# Patient Record
Sex: FEMALE | Race: WHITE | Employment: OTHER | URBAN - METROPOLITAN AREA
[De-identification: names, ages, dates, MRNs, and addresses within clinical notes are randomized per-mention and may not be internally consistent; named-entity substitution may affect disease eponyms.]

---

## 2021-09-12 ENCOUNTER — HOSPITAL ENCOUNTER (OUTPATIENT)
Age: 66
Discharge: HOME | End: 2021-09-12
Payer: MEDICARE

## 2021-09-12 VITALS
TEMPERATURE: 99 F | HEART RATE: 93 BPM | BODY MASS INDEX: 31.82 KG/M2 | OXYGEN SATURATION: 97 % | HEIGHT: 66 IN | DIASTOLIC BLOOD PRESSURE: 69 MMHG | SYSTOLIC BLOOD PRESSURE: 139 MMHG | WEIGHT: 198 LBS | RESPIRATION RATE: 16 BRPM

## 2021-09-12 DIAGNOSIS — Z20.822 ENCOUNTER FOR LABORATORY TESTING FOR COVID-19 VIRUS: Primary | ICD-10-CM

## 2021-09-12 DIAGNOSIS — R11.2 NON-INTRACTABLE VOMITING WITH NAUSEA, UNSPECIFIED VOMITING TYPE: ICD-10-CM

## 2021-09-12 LAB — SARS-COV-2 RNA RESP QL NAA+PROBE: NOT DETECTED

## 2021-09-12 PROCEDURE — U0002 COVID-19 LAB TEST NON-CDC: HCPCS | Performed by: NURSE PRACTITIONER

## 2021-09-12 PROCEDURE — 99203 OFFICE O/P NEW LOW 30 MIN: CPT | Performed by: NURSE PRACTITIONER

## 2021-09-12 RX ORDER — ATORVASTATIN CALCIUM 40 MG/1
40 TABLET, FILM COATED ORAL EVERY MORNING
COMMUNITY
Start: 2021-07-09

## 2021-09-12 RX ORDER — MONTELUKAST SODIUM 10 MG/1
10 TABLET ORAL DAILY
COMMUNITY
Start: 2021-07-26 | End: 2021-09-23

## 2021-09-12 RX ORDER — HYDROCHLOROTHIAZIDE 25 MG/1
25 TABLET ORAL EVERY MORNING
COMMUNITY
Start: 2021-07-09

## 2021-09-12 RX ORDER — SPIRONOLACTONE 50 MG/1
50 TABLET, FILM COATED ORAL DAILY
COMMUNITY
Start: 2021-07-09 | End: 2021-10-07

## 2021-09-12 RX ORDER — PIOGLITAZONEHYDROCHLORIDE 30 MG/1
30 TABLET ORAL EVERY MORNING
COMMUNITY
Start: 2021-06-14 | End: 2021-10-07

## 2021-09-12 RX ORDER — CLONAZEPAM 0.5 MG/1
0.5 TABLET ORAL NIGHTLY
COMMUNITY
Start: 2021-08-18

## 2021-09-12 RX ORDER — GLIMEPIRIDE 4 MG/1
4 TABLET ORAL 2 TIMES DAILY
COMMUNITY
Start: 2021-07-09 | End: 2021-10-07

## 2021-09-12 RX ORDER — IRBESARTAN 300 MG/1
300 TABLET ORAL EVERY MORNING
COMMUNITY
Start: 2021-07-09 | End: 2021-10-07

## 2021-09-12 RX ORDER — AMLODIPINE BESYLATE 10 MG/1
10 TABLET ORAL EVERY MORNING
COMMUNITY
Start: 2021-07-09

## 2021-09-12 RX ORDER — ONDANSETRON 4 MG/1
4 TABLET, ORALLY DISINTEGRATING ORAL EVERY 8 HOURS PRN
Qty: 10 TABLET | Refills: 0 | Status: SHIPPED | OUTPATIENT
Start: 2021-09-12 | End: 2021-09-17

## 2021-09-12 NOTE — ED PROVIDER NOTES
Patient Seen in: [facility]      History   Patient presents with:  Covid-19 Test    Stated Complaint: covid test     Subjective:   Patient presents to the immediate care accompanied by her .   Patient reports last night she had 1 episod Temp src    SpO2 97 %   O2 Device None (Room air)       Current:/69   Pulse 93   Temp 98.9 °F (37.2 °C)   Resp 16   Ht 167.6 cm (5' 6\")   Wt 89.8 kg   SpO2 97%   BMI 31.96 kg/m²         Physical Exam  Vitals and nursing note reviewed.    Jay normal.         Judgment: Judgment normal.             ED Course     Labs Reviewed   RAPID SARS-COV-2 BY PCR - Normal          No orders to display           MDM      Differential diagnosis: Gastroenteritis, COVID-19, viral illness      Patient is a 66-yea Dispersible  Take 1 tablet (4 mg total) by mouth every 8 (eight) hours as needed for Nausea., Normal, Disp-10 tablet, R-0

## 2021-09-17 ENCOUNTER — TELEMEDICINE (OUTPATIENT)
Dept: FAMILY MEDICINE CLINIC | Facility: CLINIC | Age: 66
End: 2021-09-17
Payer: MEDICARE

## 2021-09-17 DIAGNOSIS — R11.0 NAUSEA: Primary | ICD-10-CM

## 2021-09-17 PROCEDURE — 99203 OFFICE O/P NEW LOW 30 MIN: CPT | Performed by: NURSE PRACTITIONER

## 2021-09-17 RX ORDER — ONDANSETRON 4 MG/1
4 TABLET, ORALLY DISINTEGRATING ORAL EVERY 8 HOURS PRN
Qty: 15 TABLET | Refills: 0 | Status: SHIPPED | OUTPATIENT
Start: 2021-09-17 | End: 2021-09-24

## 2021-09-17 NOTE — ASSESSMENT & PLAN NOTE
Refilled zofran 4 mg I po tid prn nausea  Please call if symptoms worsen or are not resolving. Encourage soft, light diet.  Encourage fluid intake

## 2021-09-17 NOTE — PROGRESS NOTES
HPI   Video visit  Pt presents for UC follow up on 9/12 for nausea. Has appointment with Dr Junior Dempsey next Thursday. Her primary care doctor recently retired after 22 years. Is feeling nauseated and decreased appetite.  Denies abd pain, vomiting, diarrhea o Not on file  Transportation Needs:       Lack of Transportation (Medical): Not on file      Lack of Transportation (Non-Medical):  Not on file  Physical Activity:       Days of Exercise per Week: Not on file      Minutes of Exercise per Session: Not on file Constitutional:       General: She is not in acute distress. Appearance: Normal appearance. Pulmonary:      Effort: Pulmonary effort is normal. No respiratory distress.       Comments: No coughing, audible wheezing, shortness of breath or difficulty

## 2021-09-18 RX ORDER — ONDANSETRON 4 MG/1
TABLET, ORALLY DISINTEGRATING ORAL
Qty: 10 TABLET | Refills: 0 | Status: SHIPPED | OUTPATIENT
Start: 2021-09-18 | End: 2021-10-01

## 2021-09-23 ENCOUNTER — OFFICE VISIT (OUTPATIENT)
Dept: FAMILY MEDICINE CLINIC | Facility: CLINIC | Age: 66
End: 2021-09-23
Payer: MEDICARE

## 2021-09-23 VITALS
HEART RATE: 90 BPM | HEIGHT: 64.4 IN | TEMPERATURE: 98 F | DIASTOLIC BLOOD PRESSURE: 66 MMHG | WEIGHT: 189 LBS | SYSTOLIC BLOOD PRESSURE: 106 MMHG | RESPIRATION RATE: 16 BRPM | BODY MASS INDEX: 31.87 KG/M2

## 2021-09-23 DIAGNOSIS — E78.5 HYPERLIPIDEMIA, UNSPECIFIED HYPERLIPIDEMIA TYPE: ICD-10-CM

## 2021-09-23 DIAGNOSIS — Z86.39 HISTORY OF DIABETES MELLITUS: Primary | ICD-10-CM

## 2021-09-23 DIAGNOSIS — I10 ESSENTIAL HYPERTENSION: ICD-10-CM

## 2021-09-23 DIAGNOSIS — Z12.11 COLON CANCER SCREENING: ICD-10-CM

## 2021-09-23 DIAGNOSIS — R11.0 NAUSEA: ICD-10-CM

## 2021-09-23 PROCEDURE — 3008F BODY MASS INDEX DOCD: CPT | Performed by: FAMILY MEDICINE

## 2021-09-23 PROCEDURE — 99203 OFFICE O/P NEW LOW 30 MIN: CPT | Performed by: FAMILY MEDICINE

## 2021-09-23 PROCEDURE — 3078F DIAST BP <80 MM HG: CPT | Performed by: FAMILY MEDICINE

## 2021-09-23 PROCEDURE — 3074F SYST BP LT 130 MM HG: CPT | Performed by: FAMILY MEDICINE

## 2021-09-23 NOTE — PROGRESS NOTES
Subjective:   Patient ID: Rema Lucero is a 77year old female. Patient is here for follow up for chronic medical issues and to establish care. Pt's former physician just retired. The patient is compliant with medications and no side effects.  There Oral Tab Take 50 mg by mouth daily. Allergies:No Known Allergies    Objective:   Physical Exam  Constitutional:       Appearance: Normal appearance.    HENT:      Right Ear: Tympanic membrane normal.      Left Ear: Tympanic membrane normal.      Nose: screening: had COVID vaccine and negative testing:  - Also encouraged colon screening with GI and follow up if persistent nausea. Information provided.       Orders Placed This Encounter      Lipid Panel      Microalb/Creat Ratio, Random Urine      Comp Met

## 2021-09-24 ENCOUNTER — LAB ENCOUNTER (OUTPATIENT)
Dept: LAB | Age: 66
End: 2021-09-24
Attending: FAMILY MEDICINE
Payer: MEDICARE

## 2021-09-24 DIAGNOSIS — Z86.39 HISTORY OF DIABETES MELLITUS: ICD-10-CM

## 2021-09-24 LAB
ALBUMIN SERPL-MCNC: 3.7 G/DL (ref 3.4–5)
ALBUMIN/GLOB SERPL: 1 {RATIO} (ref 1–2)
ALP LIVER SERPL-CCNC: 100 U/L
ALT SERPL-CCNC: 23 U/L
ANION GAP SERPL CALC-SCNC: 8 MMOL/L (ref 0–18)
AST SERPL-CCNC: 14 U/L (ref 15–37)
BILIRUB SERPL-MCNC: 1 MG/DL (ref 0.1–2)
BUN BLD-MCNC: 23 MG/DL (ref 7–18)
BUN/CREAT SERPL: 17 (ref 10–20)
CALCIUM BLD-MCNC: 9.5 MG/DL (ref 8.5–10.1)
CHLORIDE SERPL-SCNC: 97 MMOL/L (ref 98–112)
CHOLEST SERPL-MCNC: 126 MG/DL (ref ?–200)
CO2 SERPL-SCNC: 28 MMOL/L (ref 21–32)
CREAT BLD-MCNC: 1.35 MG/DL
CREAT UR-SCNC: 203 MG/DL
DEPRECATED RDW RBC AUTO: 38.6 FL (ref 35.1–46.3)
ERYTHROCYTE [DISTWIDTH] IN BLOOD BY AUTOMATED COUNT: 12 % (ref 11–15)
EST. AVERAGE GLUCOSE BLD GHB EST-MCNC: 349 MG/DL (ref 68–126)
GLOBULIN PLAS-MCNC: 3.8 G/DL (ref 2.8–4.4)
GLUCOSE BLD-MCNC: 323 MG/DL (ref 70–99)
HBA1C MFR BLD HPLC: 13.8 % (ref ?–5.7)
HCT VFR BLD AUTO: 43.9 %
HDLC SERPL-MCNC: 42 MG/DL (ref 40–59)
HGB BLD-MCNC: 14.7 G/DL
LDLC SERPL CALC-MCNC: 63 MG/DL (ref ?–100)
MCH RBC QN AUTO: 29.1 PG (ref 26–34)
MCHC RBC AUTO-ENTMCNC: 33.5 G/DL (ref 31–37)
MCV RBC AUTO: 86.8 FL
MICROALBUMIN UR-MCNC: 26.7 MG/DL
MICROALBUMIN/CREAT 24H UR-RTO: 131.5 UG/MG (ref ?–30)
NONHDLC SERPL-MCNC: 84 MG/DL (ref ?–130)
OSMOLALITY SERPL CALC.SUM OF ELEC: 292 MOSM/KG (ref 275–295)
PATIENT FASTING Y/N/NP: YES
PATIENT FASTING Y/N/NP: YES
PLATELET # BLD AUTO: 301 10(3)UL (ref 150–450)
POTASSIUM SERPL-SCNC: 3.6 MMOL/L (ref 3.5–5.1)
PROT SERPL-MCNC: 7.5 G/DL (ref 6.4–8.2)
RBC # BLD AUTO: 5.06 X10(6)UL
SODIUM SERPL-SCNC: 133 MMOL/L (ref 136–145)
TRIGL SERPL-MCNC: 116 MG/DL (ref 30–149)
VLDLC SERPL CALC-MCNC: 17 MG/DL (ref 0–30)
WBC # BLD AUTO: 15.6 X10(3) UL (ref 4–11)

## 2021-09-24 PROCEDURE — 85027 COMPLETE CBC AUTOMATED: CPT

## 2021-09-24 PROCEDURE — 82570 ASSAY OF URINE CREATININE: CPT

## 2021-09-24 PROCEDURE — 80053 COMPREHEN METABOLIC PANEL: CPT

## 2021-09-24 PROCEDURE — 36415 COLL VENOUS BLD VENIPUNCTURE: CPT

## 2021-09-24 PROCEDURE — 83036 HEMOGLOBIN GLYCOSYLATED A1C: CPT

## 2021-09-24 PROCEDURE — 82043 UR ALBUMIN QUANTITATIVE: CPT

## 2021-09-24 PROCEDURE — 3046F HEMOGLOBIN A1C LEVEL >9.0%: CPT | Performed by: FAMILY MEDICINE

## 2021-09-24 PROCEDURE — 80061 LIPID PANEL: CPT

## 2021-09-29 ENCOUNTER — TELEMEDICINE (OUTPATIENT)
Dept: FAMILY MEDICINE CLINIC | Facility: CLINIC | Age: 66
End: 2021-09-29
Payer: MEDICARE

## 2021-09-29 ENCOUNTER — TELEPHONE (OUTPATIENT)
Dept: FAMILY MEDICINE CLINIC | Facility: CLINIC | Age: 66
End: 2021-09-29

## 2021-09-29 DIAGNOSIS — R63.0 LOSS OF APPETITE: ICD-10-CM

## 2021-09-29 DIAGNOSIS — Z86.39 HISTORY OF DIABETES MELLITUS: Primary | ICD-10-CM

## 2021-09-29 DIAGNOSIS — R11.0 NAUSEA: ICD-10-CM

## 2021-09-29 PROCEDURE — 99213 OFFICE O/P EST LOW 20 MIN: CPT | Performed by: FAMILY MEDICINE

## 2021-09-29 RX ORDER — BLOOD SUGAR DIAGNOSTIC
STRIP MISCELLANEOUS
Qty: 100 STRIP | Refills: 2 | Status: SHIPPED | OUTPATIENT
Start: 2021-09-29

## 2021-09-29 RX ORDER — BLOOD-GLUCOSE METER
KIT MISCELLANEOUS
Qty: 1 KIT | Refills: 0 | Status: SHIPPED | OUTPATIENT
Start: 2021-09-29

## 2021-09-29 NOTE — TELEPHONE ENCOUNTER
Good afternoon ,Dr. Cody Dennis is requesting for this patient to be seen before November 11? If, possible could anyone of Doctor or GI  Nurse practitioner. If, So please call the patient to schedule.   Please thanks

## 2021-09-29 NOTE — TELEPHONE ENCOUNTER
Summary of topics discussed: nausea/ loss of appetite     Pt presents with some recent nausea and now loss of appetite. No vomiting or abdominal pains. Pt states no fevers or diarrhea. Pt states foods does not seem too appetizing.  Has zofran which she take

## 2021-09-29 NOTE — PROGRESS NOTES
Subjective:   Patient ID: Ruby Clay is a 77year old female. This visit is conducted using Telemedicine with live, interactive video and audio during this Coronavirus pandemic.     Please note that the following visit was completed using two-way, Respiratory: Negative for cough and shortness of breath. Cardiovascular: Negative for chest pain. Gastrointestinal: Positive for nausea. Negative for abdominal pain, blood in stool, constipation, diarrhea and vomiting.    Psychiatric/Behavioral: Nega Etiology  - After discussion with patient, to monitor for symptoms and call if any significant symptoms; discussed soups and soft diet; zofran as needed; follow up with GI as planned. Will have staff call to see if she can be seen sooner.      No orders of

## 2021-09-29 NOTE — TELEPHONE ENCOUNTER
Pt states appointment for specialists is not until October and November. Pt states she feels nauseous and food doesn't taste good. Pt states she has not been eating very well. Pt then became tearful over the phone.    I asked if there was anything else

## 2021-10-01 ENCOUNTER — TELEPHONE (OUTPATIENT)
Dept: FAMILY MEDICINE CLINIC | Facility: CLINIC | Age: 66
End: 2021-10-01

## 2021-10-01 ENCOUNTER — HOSPITAL ENCOUNTER (INPATIENT)
Facility: HOSPITAL | Age: 66
LOS: 6 days | Discharge: HOME | DRG: 689 | End: 2021-10-07
Attending: EMERGENCY MEDICINE | Admitting: HOSPITALIST
Payer: MEDICARE

## 2021-10-01 DIAGNOSIS — N12 PYELONEPHRITIS: Primary | ICD-10-CM

## 2021-10-01 PROCEDURE — 99222 1ST HOSP IP/OBS MODERATE 55: CPT | Performed by: HOSPITALIST

## 2021-10-01 RX ORDER — ONDANSETRON 4 MG/1
4 TABLET, ORALLY DISINTEGRATING ORAL EVERY 8 HOURS PRN
Qty: 10 TABLET | Refills: 0 | Status: SHIPPED | OUTPATIENT
Start: 2021-10-01 | End: 2022-06-22

## 2021-10-01 RX ORDER — ONDANSETRON 2 MG/ML
4 INJECTION INTRAMUSCULAR; INTRAVENOUS ONCE
Status: COMPLETED | OUTPATIENT
Start: 2021-10-01 | End: 2021-10-01

## 2021-10-01 RX ORDER — ONDANSETRON 2 MG/ML
4 INJECTION INTRAMUSCULAR; INTRAVENOUS EVERY 4 HOURS PRN
Status: ACTIVE | OUTPATIENT
Start: 2021-10-01 | End: 2021-10-02

## 2021-10-01 NOTE — TELEPHONE ENCOUNTER
Refill passed per [employer] protocol. GI visit recommenced at last visit by Dr. Edwin Downey. Patient scheduled to see GI on 11/2/21. Requested Prescriptions   Pending Prescriptions Disp Refills    ondansetron 4 MG Oral Tablet Dispersible 10 tablet 0     Sig: Place 1 tablet (4 mg total) under the tongue every 8 (eight) hours as needed for Nausea.         Gastrointestional Medication Protocol Passed - 10/1/2021  3:13 PM        Passed - Appointment in past 12 or next 3 months            Future Appointments         Provider Department Appt Notes    In 3 weeks KEVAN Guillen Endocrinology diabetes     In 1 month Ramesh Haynes , [address] ref/dr/pae/loss/of/appetite/tp          Recent Outpatient Visits              2 days ago History of diabetes mellitus    [facility]    Telemedicine    1 week ago History of diabetes mellitus    Tito Mondragon MD    Office Visit    2 weeks ago Nausea    [place] [employer], [employer], [address], [facility], APRN    Telemedicine

## 2021-10-01 NOTE — ED INITIAL ASSESSMENT (HPI)
Pt verbalizes she has not been feeling since mid September. Pt reports constipation but took a laxative and that's better. Pt reports abd pain last night but now that's better.  Pt states \"I just can't eat\"

## 2021-10-01 NOTE — TELEPHONE ENCOUNTER
Patient states virtual visit 9/29 with Dr. Marjan Pettit regarding nausea and loss of appetite. Today patient reports \"not feeling good at all,\" and \"can't eat\" due to this loss of appetite and nausea. Patient tearful and asking if she should go to the ER.

## 2021-10-02 ENCOUNTER — APPOINTMENT (OUTPATIENT)
Dept: ULTRASOUND IMAGING | Facility: HOSPITAL | Age: 66
DRG: 689 | End: 2021-10-02
Attending: HOSPITALIST
Payer: MEDICARE

## 2021-10-02 PROCEDURE — 99233 SBSQ HOSP IP/OBS HIGH 50: CPT | Performed by: HOSPITALIST

## 2021-10-02 PROCEDURE — 76770 US EXAM ABDO BACK WALL COMP: CPT | Performed by: HOSPITALIST

## 2021-10-02 PROCEDURE — 99222 1ST HOSP IP/OBS MODERATE 55: CPT | Performed by: INTERNAL MEDICINE

## 2021-10-02 RX ORDER — MAGNESIUM SULFATE HEPTAHYDRATE 40 MG/ML
2 INJECTION, SOLUTION INTRAVENOUS ONCE
Status: DISCONTINUED | OUTPATIENT
Start: 2021-10-02 | End: 2021-10-06

## 2021-10-02 RX ORDER — PANTOPRAZOLE SODIUM 40 MG/1
40 TABLET, DELAYED RELEASE ORAL
Status: DISCONTINUED | OUTPATIENT
Start: 2021-10-02 | End: 2021-10-02

## 2021-10-02 RX ORDER — ATORVASTATIN CALCIUM 40 MG/1
40 TABLET, FILM COATED ORAL EVERY MORNING
Status: DISCONTINUED | OUTPATIENT
Start: 2021-10-02 | End: 2021-10-07

## 2021-10-02 RX ORDER — DEXTROSE MONOHYDRATE 25 G/50ML
50 INJECTION, SOLUTION INTRAVENOUS
Status: DISCONTINUED | OUTPATIENT
Start: 2021-10-02 | End: 2021-10-02

## 2021-10-02 RX ORDER — KETOROLAC TROMETHAMINE 15 MG/ML
15 INJECTION, SOLUTION INTRAMUSCULAR; INTRAVENOUS EVERY 6 HOURS PRN
Status: DISCONTINUED | OUTPATIENT
Start: 2021-10-02 | End: 2021-10-03

## 2021-10-02 RX ORDER — METOCLOPRAMIDE HYDROCHLORIDE 5 MG/ML
10 INJECTION INTRAMUSCULAR; INTRAVENOUS EVERY 6 HOURS PRN
Status: DISCONTINUED | OUTPATIENT
Start: 2021-10-02 | End: 2021-10-07

## 2021-10-02 RX ORDER — ZOLPIDEM TARTRATE 5 MG/1
5 TABLET ORAL NIGHTLY PRN
Status: DISCONTINUED | OUTPATIENT
Start: 2021-10-02 | End: 2021-10-07

## 2021-10-02 RX ORDER — ONDANSETRON 2 MG/ML
4 INJECTION INTRAMUSCULAR; INTRAVENOUS EVERY 4 HOURS PRN
Status: DISCONTINUED | OUTPATIENT
Start: 2021-10-02 | End: 2021-10-07

## 2021-10-02 RX ORDER — ONDANSETRON 2 MG/ML
4 INJECTION INTRAMUSCULAR; INTRAVENOUS EVERY 6 HOURS PRN
Status: DISCONTINUED | OUTPATIENT
Start: 2021-10-02 | End: 2021-10-02

## 2021-10-02 RX ORDER — POTASSIUM CHLORIDE 14.9 MG/ML
20 INJECTION INTRAVENOUS ONCE
Status: COMPLETED | OUTPATIENT
Start: 2021-10-02 | End: 2021-10-02

## 2021-10-02 RX ORDER — ACETAMINOPHEN 650 MG/1
650 SUPPOSITORY RECTAL EVERY 6 HOURS PRN
Status: DISCONTINUED | OUTPATIENT
Start: 2021-10-02 | End: 2021-10-07

## 2021-10-02 RX ORDER — SPIRONOLACTONE 50 MG/1
50 TABLET, FILM COATED ORAL DAILY
Status: DISCONTINUED | OUTPATIENT
Start: 2021-10-02 | End: 2021-10-07

## 2021-10-02 RX ORDER — SODIUM CHLORIDE 9 MG/ML
INJECTION, SOLUTION INTRAVENOUS CONTINUOUS
Status: DISCONTINUED | OUTPATIENT
Start: 2021-10-02 | End: 2021-10-03

## 2021-10-02 RX ORDER — HYDRALAZINE HYDROCHLORIDE 20 MG/ML
10 INJECTION INTRAMUSCULAR; INTRAVENOUS EVERY 4 HOURS PRN
Status: DISCONTINUED | OUTPATIENT
Start: 2021-10-02 | End: 2021-10-07

## 2021-10-02 RX ORDER — ACETAMINOPHEN 325 MG/1
650 TABLET ORAL EVERY 6 HOURS PRN
Status: DISCONTINUED | OUTPATIENT
Start: 2021-10-02 | End: 2021-10-02

## 2021-10-02 RX ORDER — AMLODIPINE BESYLATE 10 MG/1
10 TABLET ORAL EVERY MORNING
Status: DISCONTINUED | OUTPATIENT
Start: 2021-10-02 | End: 2021-10-07

## 2021-10-02 RX ORDER — CLONAZEPAM 0.5 MG/1
0.5 TABLET ORAL NIGHTLY
Status: DISCONTINUED | OUTPATIENT
Start: 2021-10-02 | End: 2021-10-07

## 2021-10-02 RX ORDER — HEPARIN SODIUM 5000 [USP'U]/ML
5000 INJECTION, SOLUTION INTRAVENOUS; SUBCUTANEOUS EVERY 12 HOURS SCHEDULED
Status: DISCONTINUED | OUTPATIENT
Start: 2021-10-02 | End: 2021-10-07

## 2021-10-02 RX ORDER — LOSARTAN POTASSIUM 100 MG/1
100 TABLET ORAL DAILY
Status: DISCONTINUED | OUTPATIENT
Start: 2021-10-02 | End: 2021-10-07

## 2021-10-02 RX ORDER — MAGNESIUM OXIDE 400 MG (241.3 MG MAGNESIUM) TABLET
400 TABLET ONCE
Status: COMPLETED | OUTPATIENT
Start: 2021-10-02 | End: 2021-10-02

## 2021-10-02 RX ORDER — DEXTROSE MONOHYDRATE 25 G/50ML
50 INJECTION, SOLUTION INTRAVENOUS
Status: DISCONTINUED | OUTPATIENT
Start: 2021-10-02 | End: 2021-10-07

## 2021-10-02 NOTE — ED PROVIDER NOTES
Patient Seen in: [facility] Emergency Department    History   Patient presents with:  Loss Of Appetite      HPI    The patient presents to the ED complaining of feeling poorly since several weeks ago.   She states that she has had decreased appetit exam.  Handwashing was performed prior to and after the exam.  Stethoscope and any equipment used during my examination was cleaned with super sani-cloth germicidal wipes following the exam.     Physical Exam  Vitals and nursing note reviewed.    Jay limits   CBC W/ DIFFERENTIAL - Abnormal; Notable for the following components:    WBC 18.7 (*)     Neutrophil Absolute Prelim 14.96 (*)     Neutrophil Absolute 14.96 (*)     Monocyte Absolute 1.62 (*)     All other components within normal limits   RAPID S The patient already has does not have any pertinent problems on file. to contribute to the complexity of this ED evaluation. ED Course: Patient presents to the ED feeling worse today. Laboratory testing sent and urinalysis concerning for UTI.   Patient

## 2021-10-02 NOTE — H&P
[facility] Patient Status:  Inpatient    1955 MRN [id number]   Location [facility] 5SW/SE Attending Steph Pineda MD   Hosp Day # 1 PCP Ellie Leger MD     Date:  10/2/2021  Date No No   Sig: To check blood sugars up to twice per day   Irbesartan 300 MG Oral Tab Past Week at Unknown time  Yes Yes   Sig: Take 300 mg by mouth every morning.    Pioglitazone HCl 30 MG Oral Tab Past Week at Unknown time  Yes Yes   Sig: Take 30 mg by mo atraumatic. Neck:  Supple, non-tender, no carotid bruit, no jugular venous distention, no lymphadenopathy, no thyromegaly.   Respiratory:  Lungs are clear to auscultation, respirations are non-labored, breath sounds are equal, symmetrical chest wall expans heparin    CODE STATUS  Full    Primary care physician  Magalys Khan MD    Disposition  Clinical course will dictate outcome      Nehemiah Echevarria MD  10/2/2021  4:48 AM

## 2021-10-02 NOTE — ED QUICK NOTES
Patient reports loss of appetite since September 8th when she ate a meal that she thought made her sick. Patient reports being constipated and loss of appetite since then.

## 2021-10-02 NOTE — CONSULTS
[facility] - [facility]    Report of Consultation    Nelson Matthewvirgil Patient Status:  Inpatient    1955 MRN [id number]   Location [facility] 5SW/SE Attending Doyle Markham MD   Hosp Day # 1 PCP Maura Thayer MD     Date of Admi flexpen 1-7 Units, 1-7 Units, Subcutaneous, TID CC and HS  •  ondansetron (ZOFRAN) injection 4 mg, 4 mg, Intravenous, Q6H PRN  •  acetaminophen (TYLENOL) tab 650 mg, 650 mg, Oral, Q6H PRN  •  hydrALAzine HCl (APRESOLINE) injection 10 mg, 10 mg, Intravenous Plan:  Patient Active Problem List:     Pain     Nausea     History of diabetes mellitus     Essential hypertension     Hyperlipidemia     Pyelonephritis    Patient is a 77year old female with Type 2 DM with DKA  Precipitating factors: uncontrolled DM, po

## 2021-10-02 NOTE — PLAN OF CARE
Problem: Patient Centered Care  Goal: Patient preferences are identified and integrated in the patient's plan of care  Description: Interventions:  - What would you like us to know as we care for you?  Keep patient involved in her care  - Provide timely, measures as appropriate and evaluate response  - Consider cultural and social influences on pain and pain management  - Manage/alleviate anxiety  - Utilize distraction and/or relaxation techniques  - Monitor for opioid side effects  - Notify MD/LIP if inte development  - Assess and document skin integrity  - Monitor for areas of redness and/or skin breakdown  - Initiate interventions, skin care algorithm/standards of care as needed  Outcome: Progressing   Patient transferred from 5th floor here, patient on 2

## 2021-10-02 NOTE — PLAN OF CARE
Afebrile, vital signs stable overnight. IV fluids running at 100 ml/hr. Pt had 2 occurrences of clear emesis. Pt to continue on IV Rocephin for UTI. Bed alarm on at all times, call light within reach, non-skid socks on, frequent rounding.     Problem: PAIN vomiting  Description: INTERVENTIONS:  - Maintain adequate hydration with IV or PO as ordered and tolerated  - Nasogastric tube to low intermittent suction as ordered  - Evaluate effectiveness of ordered antiemetic medications  - Provide nonpharmacologic c

## 2021-10-02 NOTE — PLAN OF CARE
Patient is alert and oriented x4, fever noted( see flowsheet) notified Dr. Blaze Samson received orders for blood culture which were drawn. Urine +Ecoli, IV zosyn started, IV mag not given as oral mag was given earlier.  Blood sugar monitored as ordered for the

## 2021-10-02 NOTE — ED QUICK NOTES
Orders for admission, patient is aware of plan and ready to go upstairs. Any questions, please call ED RN Terrance Martinez  at [address].      covid negative     Titratable drug(s) infusing:  Rate:  1L bolus  Rocephin     LOC at time of transport:Alert x 3  LA

## 2021-10-03 PROCEDURE — 99232 SBSQ HOSP IP/OBS MODERATE 35: CPT | Performed by: INTERNAL MEDICINE

## 2021-10-03 PROCEDURE — 99233 SBSQ HOSP IP/OBS HIGH 50: CPT | Performed by: HOSPITALIST

## 2021-10-03 RX ORDER — VANCOMYCIN HYDROCHLORIDE 125 MG/1
125 CAPSULE ORAL DAILY
Status: DISCONTINUED | OUTPATIENT
Start: 2021-10-03 | End: 2021-10-07

## 2021-10-03 RX ORDER — DEXTROSE MONOHYDRATE 25 G/50ML
50 INJECTION, SOLUTION INTRAVENOUS
Status: DISCONTINUED | OUTPATIENT
Start: 2021-10-03 | End: 2021-10-07

## 2021-10-03 RX ORDER — MAGNESIUM OXIDE 400 MG (241.3 MG MAGNESIUM) TABLET
800 TABLET ONCE
Status: COMPLETED | OUTPATIENT
Start: 2021-10-03 | End: 2021-10-03

## 2021-10-03 RX ORDER — SODIUM CHLORIDE 9 MG/ML
INJECTION, SOLUTION INTRAVENOUS CONTINUOUS
Status: DISCONTINUED | OUTPATIENT
Start: 2021-10-03 | End: 2021-10-04

## 2021-10-03 NOTE — PROGRESS NOTES
[facility]    Progress Note    Jada Chandra Patient Status:  Inpatient    1955 MRN [id number]   Location [facility] 2W/SW Attending Velma Worley MD   Hosp Day # 3 PCP Carlos Alberto Head MD     Subjective:  Feels muc in the endocrine clinic in 10-14 days, patient can please call [phone] to schedule FU  To call the clinic if sugar is under 80 or persistently over 300    Demarcus Odonnell MD

## 2021-10-03 NOTE — PROGRESS NOTES
[facility]    Progress Note    Belpre Members Patient Status:  Inpatient    1955 MRN [id number]   Location [facility] 2W/SW Attending Farooq Fernando MD   Hosp Day # 2 PCP Ousmane Sanchez MD     Subjective:  Feels muc

## 2021-10-03 NOTE — OCCUPATIONAL THERAPY NOTE
Communication Note to Team    Acknowledge & appreciate OT order. Chart Reviewed: Admit 10/1 with dec apetite (since mid Sept) - dx with acute UTI & DKA. Pt previously IND & active (cares for grandchildren).  Per PT Jonathan, 'Pt is independent with function

## 2021-10-03 NOTE — PHYSICAL THERAPY NOTE
PHYSICAL THERAPY EVALUATION - INPATIENT     Room Number: 220/220-A  Evaluation Date: 10/3/2021  Type of Evaluation: Initial   Physician Order: PT Eval and Treat    Presenting Problem:  (DKA)  Reason for Therapy: Mobility Dysfunction and Discharge Planning Prior Level of [place]: ind    SUBJECTIVE  \"I watch my grandchildren full time\"    PHYSICAL THERAPY EXAMINATION     OBJECTIVE          WEIGHT BEARING RESTRICTION                PAIN ASSESSMENT             COGNITION  · Overall Cognitive Status: At previous, functional level    Patient able to ambulate on level surfaces  At previous, functional level

## 2021-10-03 NOTE — PLAN OF CARE
Patient's vitals remained stable throughout shift. Nausea improving. Insulin gtt continued. Potassium replaced after 2100 draw. Denies pain. Will continue to monitor.     Problem: Patient Centered Care  Goal: Patient preferences are identified and integrate based on type and severity of pain and evaluate response  - Implement non-pharmacological measures as appropriate and evaluate response  - Consider cultural and social influences on pain and pain management  - Manage/alleviate anxiety  - Utilize distractio intact  Description: INTERVENTIONS  - Assess and document risk factors for pressure ulcer development  - Assess and document skin integrity  - Monitor for areas of redness and/or skin breakdown  - Initiate interventions, skin care algorithm/standards of ca

## 2021-10-03 NOTE — PLAN OF CARE
Patient A&O x4, on room air. Transitioned from insulin drip to subcutaneous insulin at 1400. Education provided on blood glucose monitoring, insulin administration, and diet. Diabetic educators consulted as well.  Patient started on clear liquid diet, state Interventions:   -   - See additional Care Plan goals for specific interventions  Outcome: Progressing     Problem: PAIN - ADULT  Goal: Verbalizes/displays adequate comfort level or patient's stated pain goal  Description: INTERVENTIONS:  - Encourage p low intermittent suction as ordered  - Evaluate effectiveness of ordered antiemetic medications  - Provide nonpharmacologic comfort measures as appropriate  - Advance diet as tolerated, if ordered  - Obtain nutritional consult as needed  - Evaluate fluid b

## 2021-10-04 ENCOUNTER — APPOINTMENT (OUTPATIENT)
Dept: CT IMAGING | Facility: HOSPITAL | Age: 66
DRG: 689 | End: 2021-10-04
Attending: INTERNAL MEDICINE
Payer: MEDICARE

## 2021-10-04 PROCEDURE — 3061F NEG MICROALBUMINURIA REV: CPT | Performed by: FAMILY MEDICINE

## 2021-10-04 PROCEDURE — 71250 CT THORAX DX C-: CPT | Performed by: INTERNAL MEDICINE

## 2021-10-04 PROCEDURE — 3060F POS MICROALBUMINURIA REV: CPT | Performed by: FAMILY MEDICINE

## 2021-10-04 PROCEDURE — 99233 SBSQ HOSP IP/OBS HIGH 50: CPT | Performed by: HOSPITALIST

## 2021-10-04 PROCEDURE — 99232 SBSQ HOSP IP/OBS MODERATE 35: CPT | Performed by: INTERNAL MEDICINE

## 2021-10-04 PROCEDURE — 99223 1ST HOSP IP/OBS HIGH 75: CPT | Performed by: INTERNAL MEDICINE

## 2021-10-04 PROCEDURE — 74176 CT ABD & PELVIS W/O CONTRAST: CPT | Performed by: INTERNAL MEDICINE

## 2021-10-04 RX ORDER — CIPROFLOXACIN 500 MG/1
500 TABLET, FILM COATED ORAL
Status: DISCONTINUED | OUTPATIENT
Start: 2021-10-04 | End: 2021-10-05

## 2021-10-04 RX ORDER — MAGNESIUM SULFATE HEPTAHYDRATE 40 MG/ML
2 INJECTION, SOLUTION INTRAVENOUS ONCE
Status: COMPLETED | OUTPATIENT
Start: 2021-10-04 | End: 2021-10-04

## 2021-10-04 NOTE — PROGRESS NOTES
[facility] - [facility]    Progress Note    Honora Sports Patient Status:  Inpatient    1955 MRN [id number]   Location [facility] 5SW/SE Attending Justice Rubinstein, MD   Hosp Day # 2 PCP Lowell Dong MD       Subjective:     Nino Organ hydration, use IV Reglan as needed for now,  -better today    Acute renal failure  Cr incr 1.86, US neg   -cont IVF  -monitor UO     Uncontrolled diabetes with associated nephropathy  Blood sugars poorly controlled, questionable compliance secondary to GI

## 2021-10-04 NOTE — CONSULTS
[place] [facility] - [facility]    Report of Consultation    Date of Admission:  10/1/2021  Date of Consult:  10/4/2021   Reason for Consultation:     JUAN     History of Present Illness:     Patient is a 77year old female with pmh of poorly controlled DM x > (VANCOCIN) cap 125 mg, 125 mg, Oral, Daily  amLODIPine (NORVASC) tab 10 mg, 10 mg, Oral, QAM  atorvastatin (LIPITOR) tab 40 mg, 40 mg, Oral, QAM  clonazePAM (KLONOPIN) tab 0.5 mg, 0.5 mg, Oral, Nightly  [Held by provider] losartan (COZAAR) tab 100 mg, 100 weakness  Neurological: negative for gait problems, memory problems and seizures  Behavioral/Psych: negative for anxiety and depression  Endocrine: negative for polyuria and weight loss     Physical Exam:   Height: --  Weight: --  BSA (Calculated - sq m): BILT 1.1  --   --   --   --    TP 6.2*  --   --   --   --     < > = values in this interval not displayed. No results found for: PTT, INR  No results for input(s): BNP in the last 168 hours.   Lab Results   Component Value Date    COLORUR Yellow 10/04

## 2021-10-04 NOTE — PLAN OF CARE
Pt with mild nausea after breakfast. Upgraded to solid food for lunch. Blood sugar well controlled. Up to bathroom with standby assist, still with diarrhea, good urine output. Transferring to medical floor room 538 on remote telemetry.  Report given to Mike Keyes Problem: PAIN - ADULT  Goal: Verbalizes/displays adequate comfort level or patient's stated pain goal  Description: INTERVENTIONS:  - Encourage pt to monitor pain and request assistance  - Assess pain using appropriate pain scale  - Administer analgesics nonpharmacologic comfort measures as appropriate  - Advance diet as tolerated, if ordered  - Obtain nutritional consult as needed  - Evaluate fluid balance  Outcome: Progressing     Problem: SKIN/TISSUE INTEGRITY - ADULT  Goal: Skin integrity remains intac

## 2021-10-04 NOTE — PAYOR COMM NOTE
--------------  ADMISSION REVIEW     Payor: Honey Gleason 673 #:  [id number]  Authorization Number: [id number]    Admit date: 10/1/21  Admit time: 11:48 PM       REVIEW DOCUMENTATION:               ED Provider Notes      ED Provider Notes signed b positives to the presenting problem noted.     Physical Exam     ED Triage Vitals [10/01/21 1854]   /78   Pulse 101   Resp 20   Temp 98.2 °F (36.8 °C)   Temp src Oral   SpO2 97 %   O2 Device None (Room air)       All measures to prevent infection mello components within normal limits   URINALYSIS WITH CULTURE REFLEX - Abnormal; Notable for the following components:    Clarity Urine Cloudy (*)     Glucose Urine >=500 (*)     Ketones Urine 20  (*)     Blood Urine Small (*)     Protein Urine 100  (*)     Ur 98% 98%   Weight: 84.4 kg     Height: 162.6 cm (5' 4\")       *I personally reviewed and interpreted all ED vitals.     Pulse Ox: 98%, Room air, Normal     Monitor Interpretation:   normal sinus rhythm    Differential Diagnosis/ Diagnostic Considerations: D Status:  Inpatient    1955 MRN [id number]   Location [facility] 5SW/SE Attending Katy Mehta MD   Hosp Day # 1 PCP Tammy Castleman, MD     Date:  10/2/2021  Date of Admission:  10/1/2021    Chief Complaint:  Patient presents with:  Loss O Oral Tab Past Week at Unknown time  Yes Yes   Sig: Take 300 mg by mouth every morning. Pioglitazone HCl 30 MG Oral Tab Past Week at Unknown time  Yes Yes   Sig: Take 30 mg by mouth every morning.    amLODIPine 10 MG Oral Tab Past Week at Unknown time  Yes distention, no lymphadenopathy, no thyromegaly. Respiratory:  Lungs are clear to auscultation, respirations are non-labored, breath sounds are equal, symmetrical chest wall expansion. Cardiovascular:  Normal rate, regular rhythm, no murmur, no edema.   Louisa Poplin MD    Disposition  Clinical course will dictate outcome      Kenyatta Ge MD  10/2/2021  4:48 AM      Electronically signed by Emperatriz Polk MD on 10/2/2021  7:33 AM         MEDICATIONS ADMINISTERED IN LAST 1 DAY:  amLODIPine (NORVASC) tab 10 mg infusion     Date Action Dose Route User    10/4/2021 0300 New Bag  Intravenous Naty Whaley    10/3/2021 1545 New Bag  Intravenous Cynthia Mota, RN      vancomycin (VANCOCIN) cap 125 mg     Date Action Dose Route User    10/4/2021 1013 Given  Oral S

## 2021-10-04 NOTE — PLAN OF CARE
Patient was up to bathroom several times throughout the night with loose bowel movements. Oral vancomycin started. Tolerating liquid diet well. Vital signs remain stable. Transfer orders in place. Will continue to monitor.    Problem: Patient Centered Care using appropriate pain scale  - Administer analgesics based on type and severity of pain and evaluate response  - Implement non-pharmacological measures as appropriate and evaluate response  - Consider cultural and social influences on pain and pain manage INTEGRITY - ADULT  Goal: Skin integrity remains intact  Description: INTERVENTIONS  - Assess and document risk factors for pressure ulcer development  - Assess and document skin integrity  - Monitor for areas of redness and/or skin breakdown  - Initiate in

## 2021-10-04 NOTE — PROGRESS NOTES
[facility]    Progress Note    Gustavo Ryan Patient Status:  Inpatient    1955 MRN [id number]   Location [facility] 5SW/SE Attending Davonte Thompson MD   Hosp Day # 3 PCP Tammy Castleman, MD       Subjective:     Jerzy Bess resistant to ampicillin and bactrim, only weakly sensitive to cefazolin, sensitive to levo/cipro  -blood cx sensitivities pending  -Renal US without obstruction.     Nausea vomiting abdominal discomfort  Suspect from DKA and UTI  More diarrhea overnight, d Interpretation  -------------------------- Sinus Rhythm Low voltage in precordial leads.  -Possible Anteroseptal infarct -age undetermined.  -Nonspecific ST depression -Nondiagnostic.  ABNORMAL No previous ECGs available Electronically signed on 10/03/2021

## 2021-10-04 NOTE — PROGRESS NOTES
[facility] Dosing Service  Antibiotic Dosing    Travis Payton is a 77year old for whom pharmacy is dosing Zosyn for treatment of  bacteremia and UTI. Allergies: has No Known Allergies.     Vitals: /72 (BP Location: Right arm)   Pulse 87   Te Braulio Trejo PharmD  10/4/2021  9:36 AM

## 2021-10-04 NOTE — CONSULTS
[facility] ID CONSULT NOTE    Rema Lucero Patient Status:  Inpatient    1955 MRN [id number]   Location [facility] 5SW/SE Attending Kim Roa MD   Hosp Day # 3 PCP Claudia Noyola MD       Reason for g, 15 g, Oral, Q15 Min PRN **OR** glucose-vitamin C (DEX-4) chewable tab 4 tablet, 4 tablet, Oral, Q15 Min PRN **OR** dextrose 50 % injection 50 mL, 50 mL, Intravenous, Q15 Min PRN **OR** glucose (DEX4) oral liquid 30 g, 30 g, Oral, Q15 Min PRN **OR** gluc No visual loss, blurred vision, double vision or yellow sclerae. Ears, Nose, Throat:  No hearing loss, sneezing, congestion, runny nose or sore throat. SKIN:  No rash or itching.   CARDIOVASCULAR:  No chest pain, chest pressure or chest discomfort  RESPIRA Microbiology: Reviewed in EMR    Radiology: Reviewed    ASSESSMENT:    Antibiotics: IV zosyn, OVP; (IV ceftriaxone x3    60-year-old female with a history of diabetes A1c 13.8, HTN, HLD who presents to hospital on 10/1 with not feeling well, constip

## 2021-10-04 NOTE — DIABETES ED
[facility]    Diabetes Education  Note    Nilton Mei Patient Status:  Inpatient   1955 MRN [id number]  Location [facility] 5SW/SE Attending Eulalia Boateng MD  Hosp Day # 3 PCP Donna King MD      Labs:    Bertha Posadas symptoms/treatment/prevention  · Actions of mixed insulin and correct timing of medication.   · Basic Diet Guidelines  · Beginning carb counting - initial meal plan provided  · Importance of good BG control to prevent short and long term complications  · Im

## 2021-10-05 PROCEDURE — 99233 SBSQ HOSP IP/OBS HIGH 50: CPT | Performed by: INTERNAL MEDICINE

## 2021-10-05 PROCEDURE — 99222 1ST HOSP IP/OBS MODERATE 55: CPT | Performed by: PHYSICIAN ASSISTANT

## 2021-10-05 PROCEDURE — 99232 SBSQ HOSP IP/OBS MODERATE 35: CPT | Performed by: INTERNAL MEDICINE

## 2021-10-05 PROCEDURE — 99233 SBSQ HOSP IP/OBS HIGH 50: CPT | Performed by: HOSPITALIST

## 2021-10-05 RX ORDER — POTASSIUM CHLORIDE 1.5 G/1.77G
40 POWDER, FOR SOLUTION ORAL ONCE
Status: COMPLETED | OUTPATIENT
Start: 2021-10-05 | End: 2021-10-05

## 2021-10-05 RX ORDER — CIPROFLOXACIN 500 MG/1
500 TABLET, FILM COATED ORAL EVERY 12 HOURS SCHEDULED
Status: DISCONTINUED | OUTPATIENT
Start: 2021-10-05 | End: 2021-10-07

## 2021-10-05 RX ORDER — MAGNESIUM OXIDE 400 MG (241.3 MG MAGNESIUM) TABLET
400 TABLET ONCE
Status: COMPLETED | OUTPATIENT
Start: 2021-10-05 | End: 2021-10-05

## 2021-10-05 RX ORDER — POTASSIUM CHLORIDE 20 MEQ/1
40 TABLET, EXTENDED RELEASE ORAL ONCE
Status: DISCONTINUED | OUTPATIENT
Start: 2021-10-05 | End: 2021-10-05

## 2021-10-05 NOTE — PROGRESS NOTES
[facility] Pharmacy Note:  Renal Adjustment for ciprofloxacin (CIPRO)    Rolando Mariscal is a 77year old patient who has been prescribed ciprofloxacin (CIPRO) 500 mg every 24 hrs.   The estimated creatinine clearance is 34.1 mL/min (A) (based on SCr of 1.4 mg/dL

## 2021-10-05 NOTE — PLAN OF CARE
Problem: Patient Centered Care  Goal: Patient preferences are identified and integrated in the patient's plan of care  Description: Interventions:  - What would you like us to know as we care for you?   - Provide timely, complete, and accurate informatio on pain and pain management  - Manage/alleviate anxiety  - Utilize distraction and/or relaxation techniques  - Monitor for opioid side effects  - Notify MD/LIP if interventions unsuccessful or patient reports new pain  - Anticipate increased pain with acti breakdown  - Initiate interventions, skin care algorithm/standards of care as needed  Outcome: Progressing     Monitoring vital signs- stable at this time. Monitoring blood glucose levels.  Fall precautions maintained- bed in lowest position, call light wit

## 2021-10-05 NOTE — PROGRESS NOTES
[facility]    Progress Note    Gustavo Ryan Patient Status:  Inpatient    1955 MRN [id number]   Location [facility] 2W/SW Attending Davonte Thompson MD   Hosp Day # 4 PCP Tammy Castleman, MD     Subjective:  Feels muc breakfast and dinner  Recommend FU in the endocrine clinic in 10-14 days, patient can please call [phone] to schedule FU  To call the clinic if sugar is under 80 or persistently over 300    Ingris Gonzalez MD

## 2021-10-05 NOTE — PAYOR COMM NOTE
--------------  CONTINUED STAY REVIEW    Payor: Fracisco Ritter  Subscriber #:  [id number]  Authorization Number: [id number]    Admit date: 10/1/21  Admit time: 11:48 PM    Admitting Physician: Steph Pineda MD  Attending Physician:  Matt Garrison short  - avoid nephrotoxins   - strict I/os       2.  UTI:  - Ecoli UTI  - ID input noted   - CT Scan pending  - abx changed from zosyn to ciprofloxacin        MEDICATIONS ADMINISTERED IN LAST 1 DAY:  amLODIPine (NORVASC) tab 10 mg     Date Action Dose Rout 9387 Given  Intravenous Maria Gomez RN    10/4/2021 1014 Given  Intravenous Erendira Woodard RN      vancomycin [facility]) cap 125 mg     Date Action Dose Route User    10/5/2021 0856 Given  Oral Maria Gomez RN    10/4/2021 1013 Given  Oral Kirt Magaña-SCI

## 2021-10-05 NOTE — CONSULTS
[facility]   Gastroenterology Consultation Note    Nelson Parks  Patient Status:    Inpatient  Date of Admission:         10/1/2021, Hospital day #4  Attending:   Doyle Markham MD  PCP:     Maura Thayer MD    Reason for Co Hx:  - No known history of esophageal, gastric or colon cancers  - No known IBD  - No known liver pathologies    Endoscopy Hx:  - remote hx of EGD/CLN 10+ years - no findings per patient     Social Hx:  - No tobacco use/No ETOH  - Denies illicit drug use spironolactone (ALDACTONE) tab, 50 mg, Oral, Daily  •  glucose (DEX4) oral liquid 15 g, 15 g, Oral, Q15 Min PRN **OR** glucose-vitamin C (DEX-4) chewable tab 4 tablet, 4 tablet, Oral, Q15 Min PRN **OR** dextrose 50 % injection 50 mL, 50 mL, Intravenous, Q1 kg), SpO2 95 %. Body mass index is 31.93 kg/m².     Gen: WDWN patient appears comfortable, NAD   HEENT: conjunctiva pink, the sclera appears anicteric, oropharynx clear, mucus membranes appear moist  CV: RRR  Lung: moves air well; no labored breathing  Abdo x6 weeks with associated 35 pound unintentional weight loss. Seen and examined with her spouse at bedside.     #nausea/vomiting  #poor PO intake  #epigastric abd discomfort  #weight loss    I suspect that the patient has underlying diabetic gastroparesis gi endoscopy/enteroscopy with the patient [who demonstrated understanding], including but not limited to the risks of bleeding, infection, pain, as well as the risks of anesthesia and perforation all leading to prolonged hospitalization, surgical intervention

## 2021-10-05 NOTE — PROGRESS NOTES
[facility]    Progress Note      Subjective:     Sitting comfortably - no cp or sob    No urinary complaints    Review of Systems:     Constitutional: negative for fatigue, fevers and weight loss  Eyes: negative for irritation, redness and g, 15 g, Oral, Q15 Min PRN   Or  glucose-vitamin C (DEX-4) chewable tab 4 tablet, 4 tablet, Oral, Q15 Min PRN   Or  dextrose 50 % injection 50 mL, 50 mL, Intravenous, Q15 Min PRN   Or  glucose (DEX4) oral liquid 30 g, 30 g, Oral, Q15 Min PRN   Or  glucose- 6. 97   WBC 19.4* 12.8* 10.3   .0 183.0 244.0     Recent Labs   Lab 10/02/21  0555 10/02/21  2109 10/03/21  0426 10/04/21  0420 10/05/21  0545   *  --  167* 98 161*   BUN 18  --  17 16 13   CREATSERUM 1.00  --  1.86* 2.03* 1.40*   GFRAA 68  -- Dictated by (CST): Alexandra Goodell, MD on 10/04/2021 at 6:21 PM     Finalized by (CST): Alexandra Goodell, MD on 10/04/2021 at 6:30 PM                Assessment and Plan:       1.  JUAN: non oliguric   -- BUN/Cr 22/1.27 mg/dl with an eGFR 44 ml/min ->1.8 -

## 2021-10-05 NOTE — PROGRESS NOTES
INFECTIOUS DISEASE PROGRESS NOTE  [place] [facility] ID PROGRESS NOTE    Мария Keene Patient Status:  Inpatient    1955 MRN [id number]   Location [facility] 5SW/SE Attending Celestino Loomis MD   [address] [facility] Hospital Day # 4 PC fatigue but denies fevers, chills, cough, shortness of breath dysuria, frequency. Also with about 35 lb weight over the last 1 month per patient. On admission T-max 100.6 with WBC up to 21.5 that has improved.   UA with pyuria and lactic acid normal.  Start

## 2021-10-05 NOTE — PROGRESS NOTES
[facility]    Progress Note    Gustavo Ryan Patient Status:  Inpatient    1955 MRN [id number]   Location [facility] 5SW/SE Attending Davonte Thompson MD   Hosp Day # 4 PCP Tammy Castleman, MD       Subjective:     Still coli, sensitivities resistant to ampicillin and bactrim, only weakly sensitive to cefazolin, sensitive to levo/cipro  -blood cx probably contaminant  -Renal US without obstruction.     Nausea vomiting abdominal discomfort  Suspect from DKA and UTI  More di 1.6 10/05/2021    PHOS 2.9 10/05/2021    TROP <0.045 10/02/2021       CT CHEST+ABDOMEN+PELVIS(CPT=71250/27562)    Result Date: 10/4/2021  CONCLUSION:  1. Perinephric fat stranding bilaterally, which may relate to edema, pyelonephritis or scarring.   Jamin Delatorre

## 2021-10-05 NOTE — PROGRESS NOTES
[facility]    Progress Note    Rema Lucero Patient Status:  Inpatient    1955 MRN [id number]   Location [facility] 2W/SW Attending Kim Roa MD   Hosp Day # 5 PCP Claudia Noyola MD     Subjective:  NPO this discharged    Recommend insulin 70/30 on discharge starting tomorrow  12 units with breakfast and dinner  Check sugars before breakfast and dinner  Recommend FU in the endocrine clinic in 10-14 days, patient can please call [phone] to schedule FU  To

## 2021-10-05 NOTE — DIETARY NOTE
ADULT NUTRITION INITIAL ASSESSMENT      RECOMMENDATIONS TO MD:  None at this time    Pt is at moderate nutrition risk. Pt does not meet malnutrition criteria.       ADMITTING DIAGNOSIS: Pyelonephritis [N12]  PERTINENT PAST MEDICAL HISTORY:   has a past med Corrective Scale insulin,, others noted. LABS: reviewed, A1C 13.8% elevated. and glucose 161 improving. NUTRITION RELATED PHYSICAL FINDINGS:  - NUTRITION FOCUSED PHYSICAL EXAM (NFPE):  no visible muscle wasting noted.     - Fluid Accumulation:  non Coordination of nutrition care: collaboration with other providers  - Discharge and transfer of nutrition care to new setting or provider: monitor plans    MONITOR AND EVALUATE/NUTRITION GOALS:  - Food and Nutrient Intake: Monitor: adequacy of PO intake an

## 2021-10-05 NOTE — H&P (VIEW-ONLY)
[facility]   Gastroenterology Consultation Note    Kahlil Stephenson  Patient Status:    Inpatient  Date of Admission:         10/1/2021, Hospital day #4  Attending:   Sabrina Shafer MD  PCP:     Zita Germain MD    Reason for Co Hx:  - No known history of esophageal, gastric or colon cancers  - No known IBD  - No known liver pathologies    Endoscopy Hx:  - remote hx of EGD/CLN 10+ years - no findings per patient     Social Hx:  - No tobacco use/No ETOH  - Denies illicit drug use spironolactone (ALDACTONE) tab, 50 mg, Oral, Daily  •  glucose (DEX4) oral liquid 15 g, 15 g, Oral, Q15 Min PRN **OR** glucose-vitamin C (DEX-4) chewable tab 4 tablet, 4 tablet, Oral, Q15 Min PRN **OR** dextrose 50 % injection 50 mL, 50 mL, Intravenous, Q1 kg), SpO2 95 %. Body mass index is 31.93 kg/m².     Gen: WDWN patient appears comfortable, NAD   HEENT: conjunctiva pink, the sclera appears anicteric, oropharynx clear, mucus membranes appear moist  CV: RRR  Lung: moves air well; no labored breathing  Abdo x6 weeks with associated 35 pound unintentional weight loss. Seen and examined with her spouse at bedside.     #nausea/vomiting  #poor PO intake  #epigastric abd discomfort  #weight loss    I suspect that the patient has underlying diabetic gastroparesis gi endoscopy/enteroscopy with the patient [who demonstrated understanding], including but not limited to the risks of bleeding, infection, pain, as well as the risks of anesthesia and perforation all leading to prolonged hospitalization, surgical intervention

## 2021-10-06 ENCOUNTER — ANESTHESIA EVENT (OUTPATIENT)
Dept: ENDOSCOPY | Facility: HOSPITAL | Age: 66
DRG: 689 | End: 2021-10-06
Payer: MEDICARE

## 2021-10-06 ENCOUNTER — ANESTHESIA (OUTPATIENT)
Dept: ENDOSCOPY | Facility: HOSPITAL | Age: 66
DRG: 689 | End: 2021-10-06
Payer: MEDICARE

## 2021-10-06 PROCEDURE — 99232 SBSQ HOSP IP/OBS MODERATE 35: CPT | Performed by: INTERNAL MEDICINE

## 2021-10-06 PROCEDURE — 99232 SBSQ HOSP IP/OBS MODERATE 35: CPT | Performed by: PHYSICIAN ASSISTANT

## 2021-10-06 PROCEDURE — 0DB68ZX EXCISION OF STOMACH, VIA NATURAL OR ARTIFICIAL OPENING ENDOSCOPIC, DIAGNOSTIC: ICD-10-PCS | Performed by: INTERNAL MEDICINE

## 2021-10-06 PROCEDURE — 43239 EGD BIOPSY SINGLE/MULTIPLE: CPT | Performed by: INTERNAL MEDICINE

## 2021-10-06 PROCEDURE — 99233 SBSQ HOSP IP/OBS HIGH 50: CPT | Performed by: HOSPITALIST

## 2021-10-06 RX ORDER — METOCLOPRAMIDE HYDROCHLORIDE 5 MG/ML
10 INJECTION INTRAMUSCULAR; INTRAVENOUS
Status: DISCONTINUED | OUTPATIENT
Start: 2021-10-06 | End: 2021-10-07

## 2021-10-06 RX ORDER — NALOXONE HYDROCHLORIDE 0.4 MG/ML
80 INJECTION, SOLUTION INTRAMUSCULAR; INTRAVENOUS; SUBCUTANEOUS AS NEEDED
Status: CANCELLED | OUTPATIENT
Start: 2021-10-06 | End: 2021-10-06

## 2021-10-06 RX ORDER — CIPROFLOXACIN 500 MG/1
500 TABLET, FILM COATED ORAL 2 TIMES DAILY
Qty: 8 TABLET | Refills: 0 | Status: SHIPPED | OUTPATIENT
Start: 2021-10-06 | End: 2021-10-10

## 2021-10-06 RX ORDER — SODIUM CHLORIDE, SODIUM LACTATE, POTASSIUM CHLORIDE, CALCIUM CHLORIDE 600; 310; 30; 20 MG/100ML; MG/100ML; MG/100ML; MG/100ML
INJECTION, SOLUTION INTRAVENOUS CONTINUOUS
Status: CANCELLED | OUTPATIENT
Start: 2021-10-06

## 2021-10-06 RX ORDER — VANCOMYCIN HYDROCHLORIDE 125 MG/1
125 CAPSULE ORAL DAILY
Qty: 10 CAPSULE | Refills: 0 | Status: SHIPPED | OUTPATIENT
Start: 2021-10-06 | End: 2021-10-16

## 2021-10-06 RX ORDER — LIDOCAINE HYDROCHLORIDE 10 MG/ML
INJECTION, SOLUTION EPIDURAL; INFILTRATION; INTRACAUDAL; PERINEURAL AS NEEDED
Status: DISCONTINUED | OUTPATIENT
Start: 2021-10-06 | End: 2021-10-06 | Stop reason: SURG

## 2021-10-06 RX ORDER — DEXTROSE MONOHYDRATE 25 G/50ML
50 INJECTION, SOLUTION INTRAVENOUS
Status: CANCELLED | OUTPATIENT
Start: 2021-10-06

## 2021-10-06 RX ORDER — SODIUM CHLORIDE, SODIUM LACTATE, POTASSIUM CHLORIDE, CALCIUM CHLORIDE 600; 310; 30; 20 MG/100ML; MG/100ML; MG/100ML; MG/100ML
INJECTION, SOLUTION INTRAVENOUS CONTINUOUS PRN
Status: DISCONTINUED | OUTPATIENT
Start: 2021-10-06 | End: 2021-10-06 | Stop reason: SURG

## 2021-10-06 RX ADMIN — SODIUM CHLORIDE, SODIUM LACTATE, POTASSIUM CHLORIDE, CALCIUM CHLORIDE: 600; 310; 30; 20 INJECTION, SOLUTION INTRAVENOUS at 13:12:00

## 2021-10-06 RX ADMIN — LIDOCAINE HYDROCHLORIDE 25 MG: 10 INJECTION, SOLUTION EPIDURAL; INFILTRATION; INTRACAUDAL; PERINEURAL at 13:14:00

## 2021-10-06 NOTE — ANESTHESIA PREPROCEDURE EVALUATION
Anesthesia PreOp Note    HPI:     Justin Bowling is a 77year old female who presents for preoperative consultation requested by:  Juliane Rodríguez MD    Date of Surgery: 10/1/2021 - 10/6/2021    Procedure(s):  ESOPHAGOGASTRODUODENOSCOPY (EGD)  Indication: by mouth every morning., Disp: , Rfl: , Past Week at Unknown time  spironolactone 50 MG Oral Tab, Take 50 mg by mouth daily. , Disp: , Rfl: , Past Week at Unknown time  ondansetron 4 MG Oral Tablet Dispersible, Place 1 tablet (4 mg total) under the tongue e 40 mg, 40 mg, Oral, QAM, Diya Whitfield MD, 40 mg at 10/05/21 0856  clonazePAM (KLONOPIN) tab 0.5 mg, 0.5 mg, Oral, Nightly, Diya Whitfield MD, 0.5 mg at 10/05/21 2040  [Held by provider] losartan (COZAAR) tab 100 mg, 100 mg, Oral, Daily, Rashad Valladares Relation Age of Onset   • Diabetes Father    • Hypertension Father    • Hypertension Mother      Social History    Socioeconomic History      Marital status:       Spouse name: Not on file      Number of children: Not on file      Years of education reviewed.   Lab Results   Component Value Date    WBC 9.0 10/06/2021    RBC 3.90 10/06/2021    HGB 11.2 (L) 10/06/2021    HCT 34.3 (L) 10/06/2021    MCV 87.9 10/06/2021    MCH 28.7 10/06/2021    MCHC 32.7 10/06/2021    RDW 12.8 10/06/2021    .0 10/06 Della Estrella and/or legal guardian or family member of the nature of the anesthetic plan, benefits, risks including possible dental damage if relevant, major complications, and any alternative forms of anesthetic management.    All of the patient's questions w

## 2021-10-06 NOTE — INTERVAL H&P NOTE
Pre-op Diagnosis: Nausea, Vomitting, unintentional weight loss, abd pain    The above referenced H&P was reviewed by Art Ibrahim MD on 10/6/2021, the patient was examined and no significant changes have occurred in the patient's condition since the H&P

## 2021-10-06 NOTE — PLAN OF CARE
Problem: Patient Centered Care  Goal: Patient preferences are identified and integrated in the patient's plan of care  Description: Interventions:  - What would you like us to know as we care for you?  I am from home with my spouse Parish  - Provide timely, non-pharmacological measures as appropriate and evaluate response  - Consider cultural and social influences on pain and pain management  - Manage/alleviate anxiety  - Utilize distraction and/or relaxation techniques  - Monitor for opioid side effects  - N pressure ulcer development  - Assess and document skin integrity  - Monitor for areas of redness and/or skin breakdown  - Initiate interventions, skin care algorithm/standards of care as needed  Outcome: Progressing    Monitoring vital signs- stable at [facility]

## 2021-10-06 NOTE — OPERATIVE REPORT
ESOPHAGOGASTRODUODENOSCOPY (EGD) REPORT    Rosanne Reyes     1955 Age 77year old   PCP Nichole Montenegro MD Endoscopist Bozena Nichols MD     Date of procedure: 10/06/21    Procedure: EGD w/biopsies    Pre-operative diagnosis: n/v, uncontrolled DM    P incisura) were taken with cold forceps for histology. Paucity of contractions seen in stomach (< 3/min) suggestive of gastroparesis. 3. Duodenum: The duodenal mucosa appeared normal in the 1st and 2nd and 3rd portion of the duodenum.      We then withdre

## 2021-10-06 NOTE — PROGRESS NOTES
[facility]  Nephrology Daily Progress Note    Hola Mccoy  [id number]  77year old      HPI:   Hola Mccoy is a 77year old female. Just feeling a little tired but otherwise OK. NPO for EGD today.   Was drinking fluids well last 10/06/2021    HGB 11.2 10/06/2021    HCT 34.3 10/06/2021    .0 10/06/2021    CREATSERUM 1.03 10/06/2021    BUN 12 10/06/2021     10/06/2021    K 3.7 10/06/2021     10/06/2021    CO2 28.0 10/06/2021     10/06/2021    CA 8.4 10/06/2 Cassi Cruz MD on 10/04/2021 at 6:30 PM              Medications:    Current Facility-Administered Medications:   •  magnesium sulfate IVPB premix 4 g, 4 g, Intravenous, Once  •  ciprofloxacin (CIPRO) tab 500 mg, 500 mg, Oral, 2 times per day  •  insulin detem Sodium (PROTONIX) 40 mg in Sodium Chloride (PF) 0.9 % 10 mL IV push, 40 mg, Intravenous, Daily  •  acetaminophen (Tylenol) rectal suppository 650 mg, 650 mg, Rectal, Q6H PRN  •  ondansetron (ZOFRAN) injection 4 mg, 4 mg, Intravenous, Q4H PRN    Allergies:

## 2021-10-06 NOTE — ANESTHESIA POSTPROCEDURE EVALUATION
Patient: Ebb Sly    Procedure Summary     Date: 10/06/21 Room / Location: [address] [place] [address] ENDOSCOPY 05 / [address] [place] [address] ENDOSCOPY    Anesthesia Start: 3011 Anesthesia Stop: 8506    Procedure: ESOPHAGOGASTRODUODENOSCOPY (EGD) (N/A ) Diagnosis: (gastroparesis)    Surgeons

## 2021-10-06 NOTE — PROGRESS NOTES
[facility]     Gastroenterology Progress Note    Chris Meghan Patient Status:  Inpatient    1955 MRN [id number]   Location [facility] 5SW/SE Attending Lucille Rosado MD   Hosp Day # 5 PCP Spencer Goodpasture, MD Darrelyn Ouch fever overnight, recommend EGD evaluation today. Additional outpatient evaluation to include gastric emptying scan and colonoscopy.      Continue anti-emetics and PPI daily.     Admitted with findings of diabetic ketoacidosis which have improved as well as the chest, abdomen or pelvis. 3. Status post hysterectomy. 4. Moderate hepatic steatosis with mild splenomegaly. 5. Mild atelectasis and/or scarring in both lower lungs. 6. Lesser incidental findings as above.     Dictated by (CST): Aurora Lock MD on

## 2021-10-06 NOTE — PROGRESS NOTES
made Pastoral visit.  consulted with bedside RN. RN reported that Pt is having a procedure. Pt was sitting up in bed and talking on her cell phone. Pt expressed that she was doing fine. Pt is  and has an adult daughter.      Pt h

## 2021-10-06 NOTE — PROGRESS NOTES
[facility]OSP - [facility]    Progress Note    Harika Galdamez Patient Status:  Inpatient    1955 MRN [id number]   [facility] 2W/SW Attending Teresa Bennett MD   Hosp Day # 6 PCP Dinora Guthrie MD     Subjective  Feels okay sugars before breakfast and dinner  Recommend FU in the endocrine clinic in 10-14 days, patient can please call [phone] to schedule FU  To call the clinic if sugar is under 80 or persistently over 300    Pia Muniz MD

## 2021-10-06 NOTE — PROGRESS NOTES
[facility]    Progress Note    Elcalvin Carballo Patient Status:  Inpatient    1955 MRN [id number]   Location [facility] 5SW/SE Attending Vignesh Grace MD   Hosp Day # 5 PCP Elisabet Hernandez MD       Subjective:     Eddie Ruiz positive for E. coli, sensitivities resistant to ampicillin and bactrim, only weakly sensitive to cefazolin, sensitive to levo/cipro  -blood cx probably contaminant  -Renal US without obstruction.     Nausea vomiting abdominal discomfort  Suspect from fadi K 3.7 10/06/2021     10/06/2021    CO2 28.0 10/06/2021     (H) 10/06/2021    CA 8.4 (L) 10/06/2021    ALB 2.1 (L) 10/06/2021    ALKPHO 75 10/06/2021    BILT 0.5 10/06/2021    TP 6.3 (L) 10/06/2021    AST 19 10/06/2021    ALT 23 10/06/2021    M

## 2021-10-07 VITALS
SYSTOLIC BLOOD PRESSURE: 107 MMHG | DIASTOLIC BLOOD PRESSURE: 76 MMHG | OXYGEN SATURATION: 95 % | HEIGHT: 64 IN | HEART RATE: 93 BPM | RESPIRATION RATE: 14 BRPM | TEMPERATURE: 99 F | WEIGHT: 186 LBS | BODY MASS INDEX: 31.76 KG/M2

## 2021-10-07 PROCEDURE — 99232 SBSQ HOSP IP/OBS MODERATE 35: CPT | Performed by: PHYSICIAN ASSISTANT

## 2021-10-07 PROCEDURE — 99239 HOSP IP/OBS DSCHRG MGMT >30: CPT | Performed by: HOSPITALIST

## 2021-10-07 PROCEDURE — 99232 SBSQ HOSP IP/OBS MODERATE 35: CPT | Performed by: INTERNAL MEDICINE

## 2021-10-07 RX ORDER — HUMAN INSULIN 100 [IU]/ML
12 INJECTION, SUSPENSION SUBCUTANEOUS 2 TIMES DAILY
Qty: 1 EACH | Refills: 0 | Status: SHIPPED | OUTPATIENT
Start: 2021-10-07

## 2021-10-07 NOTE — DISCHARGE SUMMARY
[facility] HOSPITALIST  DISCHARGE SUMMARY     Kahlil Stephenson Patient Status:  Inpatient    1955 MRN [id number]   [facility] 5SW/SE Attending Deanan Petty, [address] [facility] Se Day # 6 PCP Zita Germain MD     DATE OF ADMISSION: 10/1/2021  DA unremarkable aside from suspected gastroparesis. Patient understands return to the emergency room for increased pain, fever, discharge, shortness of breath, chest pain, new neurologic symptoms, other concerning symptoms.     PHYSICAL EXAM:  Temp:  [98.6 Tabs  Commonly known as: KLONOPIN      Take 0.5 mg by mouth nightly. TAKE AT BEDTIME   Refills: 0     hydroCHLOROthiazide 25 MG Tabs  Commonly known as: HYDRODIURIL      Take 25 mg by mouth every morning.    Refills: 0     ondansetron 4 MG Tbdp  Commonly kn [address] [facility]  [phone]    Schedule an appointment as soon as possible for a visit in 10 days  diabetes    [facility], [address]  [phone]    In 4 weeks  HAILEY Gipson MD  [facility]

## 2021-10-07 NOTE — PROGRESS NOTES
[place] [facility] - [facility]    Progress Note      Subjective:     S/p EGD yesterday - showed severely diminished gastric contraction     No nausea or vomiting or diarrhea     Review of Systems:     Constitutional: negative for fatigue, fevers and weight los Subcutaneous, Q24H  Insulin Aspart Pen (NOVOLOG) 100 UNIT/ML flexpen 4 Units, 4 Units, Subcutaneous, TID CC  glucose (DEX4) oral liquid 15 g, 15 g, Oral, Q15 Min PRN   Or  glucose-vitamin C (DEX-4) chewable tab 4 tablet, 4 tablet, Oral, Q15 Min PRN   Or  d 10.8*   HCT 32.8* 34.3* 32.8*   MCV 87.2 87.9 88.6   NEPRELIM 6.97 5.44 5.38   WBC 10.3 9.0 9.0   .0 261.0 255.0     Recent Labs   Lab 10/02/21  0555 10/02/21  2109 10/05/21  0545 10/06/21  0530 10/07/21  0521   *   < > 161* 178* 170*   BUN 1

## 2021-10-07 NOTE — PROGRESS NOTES
Discharge RN Summary: Patient has discharge order in. Patient to discharge home with family. IV removed by this RN. Understands to follow up with PCP in 1 week. Patient understands to  meds with printed scripts and electronically.        Scripts sen

## 2021-10-07 NOTE — PLAN OF CARE
EGD today  Gave reglan prior to meals. No complaints of nausea or vomiting.      Problem: Diabetes/Glucose Control  Goal: Glucose maintained within prescribed range  Description: INTERVENTIONS:  - Monitor Blood Glucose as ordered  - Assess for signs and sy

## 2021-10-07 NOTE — DIETARY NOTE
Education Nutrition Note    Received  order for diet education. Provided pt and family diet instructions and hand out on Gastroparesis Nutrition Therapy. Emphasized Small frequent meals, Low fiber and low fat and soft or smooth food texture.  Encourage pt t

## 2021-10-07 NOTE — PLAN OF CARE
Pt alert and oriented. VS stable overnight. No nausea/vomiting overnight. Fall precautions in place. Plan for home upon discharge.      Problem: Patient Centered Care  Goal: Patient preferences are identified and integrated in the patient's plan of care ordered antiemetic medications  - Provide nonpharmacologic comfort measures as appropriate  - Advance diet as tolerated, if ordered  - Obtain nutritional consult as needed  - Evaluate fluid balance  Outcome: Progressing     - Provide timely, complete, and

## 2021-10-07 NOTE — PROGRESS NOTES
[facility]     Gastroenterology Progress Note    Miguel Griffith Patient Status:  Inpatient    1955 MRN [id number]   Location [facility] 5SW/SE Attending Vargas Aguilar MD   Hosp Day # 6 PCP MD Jacky Schwab gastroparesis (in setting of A1c 13.8%)  2.  Otherwise normal EGD  PATH (reviewed with patient and spouse)  Stomach; biopsy:  · Gastric oxyntic type mucosa without significant histopathology  · Diff-quik stain (with reactive positive control) is negative fo

## 2021-10-08 ENCOUNTER — PATIENT OUTREACH (OUTPATIENT)
Dept: CASE MANAGEMENT | Age: 66
End: 2021-10-08

## 2021-10-08 ENCOUNTER — TELEPHONE (OUTPATIENT)
Dept: ENDOCRINOLOGY CLINIC | Facility: CLINIC | Age: 66
End: 2021-10-08

## 2021-10-08 DIAGNOSIS — Z02.9 ENCOUNTERS FOR UNSPECIFIED ADMINISTRATIVE PURPOSE: ICD-10-CM

## 2021-10-08 NOTE — PAYOR COMM NOTE
--------------  DISCHARGE REVIEW    Payor: Wilmon Paget  Subscriber #:  [id number]  Authorization Number: [id number]    Admit date: 10/1/21  Admit time:  11:48 PM  Discharge Date: 10/7/2021  5:00 PM     Admitting Physician: Jeremi Pearson MD  Attending generalized weakness as well. HOSPITAL COURSE:  Patient was admitted, then transferred to the PCU for insulin drip as she developed DKA. Anion gap closed, endocrine consulted, switched to subcu insulin and did well.   She will be discharged on subcu ins UNIT/ML Supn  Generic drug: Insulin NPH & Regular      Inject 12 Units into the skin 2 (two) times a day. Quantity: 1 each  Refills: 0     vancomycin 125 MG Caps  Commonly known as: VANCOCIN      Take 1 capsule (125 mg total) by mouth daily for 10 days. Tabs  · NovoLIN 70/30 FlexPen Relion (70-30) 100 UNIT/ML Supn         CONSULTANTS  Consultants  Chat With All Active Members    Provider Role Specialty    Wesley Hassan MD  Consulting Physician  Cherelle Arciniega MD  Consulting Phys

## 2021-10-08 NOTE — TELEPHONE ENCOUNTER
RN offered 10/20/21 at [facility] with APN which she accepted. RN advised her to continue checking her BG and bring the log to her appointment. She voiced understanding. Pt states she's been at [facility] location; RN still reviewed location.       Future Appointments

## 2021-10-08 NOTE — TELEPHONE ENCOUNTER
Pt called in stating that she needs to be seen within 2 weeks for hospital follow up. Pt is on schedule for October 22nd but is wondering if she can be seen sooner.  The schedule is booked please follow up

## 2021-10-11 ENCOUNTER — TELEPHONE (OUTPATIENT)
Dept: FAMILY MEDICINE CLINIC | Facility: CLINIC | Age: 66
End: 2021-10-11

## 2021-10-11 NOTE — TELEPHONE ENCOUNTER
Attempts to reach patient have been unsuccessful, messages left and no call back as yet. Patient has an HFU scheduled on 10/13/2021 at 10:20 am for 20 minutes, Patient needs a TCM HFU which requires a 30 minute appointment.      Triage: Please notify

## 2021-10-11 NOTE — PROGRESS NOTES
NCM placed call to patient for TCM, LM requesting a call to [phone] back with a condition update. JENNIFER sent message to MD's office that patient needs a longer appointment for TCM HFU appointment.

## 2021-10-13 ENCOUNTER — OFFICE VISIT (OUTPATIENT)
Dept: FAMILY MEDICINE CLINIC | Facility: CLINIC | Age: 66
End: 2021-10-13
Payer: MEDICARE

## 2021-10-13 VITALS
RESPIRATION RATE: 14 BRPM | HEART RATE: 92 BPM | HEIGHT: 64.6 IN | WEIGHT: 184 LBS | TEMPERATURE: 97 F | SYSTOLIC BLOOD PRESSURE: 90 MMHG | BODY MASS INDEX: 31.03 KG/M2 | DIASTOLIC BLOOD PRESSURE: 62 MMHG

## 2021-10-13 DIAGNOSIS — I10 ESSENTIAL HYPERTENSION: ICD-10-CM

## 2021-10-13 DIAGNOSIS — N12 PYELONEPHRITIS: ICD-10-CM

## 2021-10-13 DIAGNOSIS — R11.0 NAUSEA: ICD-10-CM

## 2021-10-13 DIAGNOSIS — Z86.39 HISTORY OF DIABETES MELLITUS: ICD-10-CM

## 2021-10-13 PROCEDURE — 1111F DSCHRG MED/CURRENT MED MERGE: CPT | Performed by: FAMILY MEDICINE

## 2021-10-13 PROCEDURE — 99495 TRANSJ CARE MGMT MOD F2F 14D: CPT | Performed by: FAMILY MEDICINE

## 2021-10-13 PROCEDURE — 3008F BODY MASS INDEX DOCD: CPT | Performed by: FAMILY MEDICINE

## 2021-10-13 PROCEDURE — 3074F SYST BP LT 130 MM HG: CPT | Performed by: FAMILY MEDICINE

## 2021-10-13 PROCEDURE — 3078F DIAST BP <80 MM HG: CPT | Performed by: FAMILY MEDICINE

## 2021-10-13 NOTE — PROGRESS NOTES
HPI:    Jada Chandra is a 77year old female here today for hospital follow up.    She was discharged from Inpatient hospital, [facility]  to Home   Admission Date: 10/1/21   Discharge Date: 10/7/21  Hospital Discharge Diagnoses (since 9/13/2021) Hosp-Admission  Discharged     10/1/2021 - 10/7/2021 (6 days)  [place]λων MD Belén    Last attending • Treatment team  Pyelonephritis    Principal problem       Discharge Summary  Margie Teixeira MD (Physician) • • Hospitalist  [facility] outpatient.     Patient had significant nausea during her admission. Seems like this may have been going on for some time prior to admission. Suspect combination of diabetic gastroparesis and reaction to her acute infection.   EGD during this admission wa these medications      Instructions Prescription details   amLODIPine 10 MG Tabs  Commonly known as: NORVASC       Take 10 mg by mouth every morning.    Refills: 0      atorvastatin 40 MG Tabs  Commonly known as: LIPITOR       Take 40 mg by mouth every mor INFECTIOUS DISEASES      Rickie Mckenzie MD  Consulting Physician  NEPHROLOGY     Kahlil Dash MD  Consulting Physician  ENDOCRINOLOGY             FOLLOW UP:  MD Kye [address] [id number]  [phone]     Schedule an a every morning. atorvastatin 40 MG Oral Tab, Take 40 mg by mouth every morning. clonazePAM 0.5 MG Oral Tab, Take 0.5 mg by mouth nightly. TAKE AT BEDTIME  hydroCHLOROthiazide 25 MG Oral Tab, Take 25 mg by mouth every morning.     No current facility-admini Normal rate and regular rhythm. Pulses: Normal pulses. Heart sounds: Normal heart sounds. No murmur heard. Pulmonary:      Effort: Pulmonary effort is normal. No respiratory distress. Breath sounds: Normal breath sounds. No wheezing. Medical Decision Making- Based on service period of discharge to 30 days:   · Number of Possible Diagnoses and/or Management Options: moderate  · Amount and/or Complexity of Data to Be Reviewed: moderate  · Risk of Significant Complications, Morbidity, a

## 2021-10-13 NOTE — PROGRESS NOTES
Multiple attempts to reach the pt and messages left with no returned phone call. Pt went to HFU with PCP today, closing encounter.

## 2021-10-20 ENCOUNTER — OFFICE VISIT (OUTPATIENT)
Dept: ENDOCRINOLOGY CLINIC | Facility: CLINIC | Age: 66
End: 2021-10-20
Payer: MEDICARE

## 2021-10-20 VITALS
SYSTOLIC BLOOD PRESSURE: 116 MMHG | WEIGHT: 187 LBS | DIASTOLIC BLOOD PRESSURE: 74 MMHG | HEIGHT: 64.5 IN | HEART RATE: 88 BPM | BODY MASS INDEX: 31.54 KG/M2

## 2021-10-20 DIAGNOSIS — E11.65 UNCONTROLLED TYPE 2 DIABETES MELLITUS WITH HYPERGLYCEMIA (HCC): Primary | ICD-10-CM

## 2021-10-20 DIAGNOSIS — I10 ESSENTIAL HYPERTENSION: ICD-10-CM

## 2021-10-20 PROCEDURE — 3078F DIAST BP <80 MM HG: CPT | Performed by: NURSE PRACTITIONER

## 2021-10-20 PROCEDURE — 3046F HEMOGLOBIN A1C LEVEL >9.0%: CPT | Performed by: NURSE PRACTITIONER

## 2021-10-20 PROCEDURE — 82947 ASSAY GLUCOSE BLOOD QUANT: CPT | Performed by: NURSE PRACTITIONER

## 2021-10-20 PROCEDURE — 3008F BODY MASS INDEX DOCD: CPT | Performed by: NURSE PRACTITIONER

## 2021-10-20 PROCEDURE — 3074F SYST BP LT 130 MM HG: CPT | Performed by: NURSE PRACTITIONER

## 2021-10-20 PROCEDURE — 99214 OFFICE O/P EST MOD 30 MIN: CPT | Performed by: NURSE PRACTITIONER

## 2021-10-20 NOTE — PATIENT INSTRUCTIONS
A1C: 12.4% today --> decreased from 13.8% on 9/24/2021  Blood glucose: 253 in clinic today    Medications:   -Increase Novolin 70/30: 12--> 20 units with breakfast      12--> 20 units with dinner       -lets work on limiting carbohydrates to 30- 45gm per m

## 2021-10-20 NOTE — PROGRESS NOTES
Name: Miguel Griffith  Date: 10/20/2021    Referring Physician: No ref. provider found    CHIEF COMPLAINT   Patient presents with:  Consult: Pt is here to discuss DM. Poc  and A1c 12.4 Was in the ER on 10/01/21 not feeling well and no appetite.     H (grapes) with water or diluted apple juice or diluated orange juice. Lunch: casadilla or sandwich with cottage cheese and peaches; skips 3-4x weekly   Dinner: chicken with green beans and mastacholi; or spaghetti or meatloaf with carrots and potatoes. sugars up to twice per day, Disp: 100 strip, Rfl: 2  •  amLODIPine 10 MG Oral Tab, Take 10 mg by mouth every morning. (Patient not taking: Reported on 10/20/2021), Disp: , Rfl:   •  atorvastatin 40 MG Oral Tab, Take 40 mg by mouth every morning.  (Patient n swelling, no lesions    LABS: Pertinent labs reviewed    ASSESSMENT/PLAN:    -Reviewed with patient the pathogenesis of diabetes, clinical significance of A1c, and common complications such as: microvascular, macrovascular and diabetic ketoacidosis.  Cricket Patient instructed to call sooner if they develop Blood glucose readings <75 and/or if they have readings persistently >200. The risks and benefits of my recommendations were discussed with the patient today.  questions were also answered to the best

## 2021-10-21 ENCOUNTER — NURSE ONLY (OUTPATIENT)
Dept: ENDOCRINOLOGY CLINIC | Facility: CLINIC | Age: 66
End: 2021-10-21
Payer: MEDICARE

## 2021-10-21 DIAGNOSIS — Z86.39 HISTORY OF DIABETES MELLITUS: ICD-10-CM

## 2021-10-21 NOTE — PROGRESS NOTES
Ruby Clay  : 1955 attended individual initial assessment for Diabetes Education:    Date: 10/21/2021   Start time: 1235P End time: 105P    HEMOGLOBIN A1C (%)   Date Value   10/20/2021 12.4 (A)        8a/9a in morning insulin  This morning at this time.     Isaias Heard RD

## 2021-11-01 NOTE — H&P
[facility], [facility] - Gastroenterology                                                                                                               Reason for consult: f/u    Requesting physician or provider: Rajwinder Orozco normal      Wt Readings from Last 6 Encounters:  11/02/21 : 193 lb (87.5 kg)  10/20/21 : 187 lb (84.8 kg)  10/13/21 : 184 lb (83.5 kg)  10/01/21 : 186 lb (84.4 kg)  09/23/21 : 189 lb (85.7 kg)  09/12/21 : 198 lb (89.8 kg)       History, Medications, Allerg mg by mouth every morning.  (Patient not taking: No sig reported)         Allergies:  No Known Allergies    ROS:   CONSTITUTIONAL: negative for fevers, chills, sweats and weight loss  EYES Negative for red eyes, yellow eyes, changes in vision  HEENT: Negati Collection Time: 10/07/21  5:21 AM   Result Value Ref Range    WBC 9.0 4.0 - 11.0 x10(3) uL    RBC 3.70 (L) 3.80 - 5.30 x10(6)uL    HGB 10.8 (L) 12.0 - 16.0 g/dL    HCT 32.8 (L) 35.0 - 48.0 %    MCV 88.6 80.0 - 100.0 fL    MCH 29.2 26.0 - 34.0 pg    MCHC 3 number of results and/or encounters for the requested time period, some results have not been displayed. A complete set of results can be found in Results Review.              .  ASSESSMENT/PLAN:   Justin Bowling is a 77year old year-old female with hist Read all bowel prep instructions carefully    5. AVOID seeds, nuts, popcorn, raw fruits and vegetables (cooked is okay) for 2-3 days before procedure    6. You will need to be tested for COVID within 72 hours of your procedure.   You will be contacted with

## 2021-11-02 ENCOUNTER — TELEPHONE (OUTPATIENT)
Dept: GASTROENTEROLOGY | Facility: CLINIC | Age: 66
End: 2021-11-02

## 2021-11-02 ENCOUNTER — OFFICE VISIT (OUTPATIENT)
Dept: GASTROENTEROLOGY | Facility: CLINIC | Age: 66
End: 2021-11-02
Payer: MEDICARE

## 2021-11-02 VITALS
DIASTOLIC BLOOD PRESSURE: 70 MMHG | BODY MASS INDEX: 31.77 KG/M2 | HEIGHT: 65.52 IN | HEART RATE: 69 BPM | WEIGHT: 193 LBS | SYSTOLIC BLOOD PRESSURE: 128 MMHG

## 2021-11-02 DIAGNOSIS — R16.1 SPLENOMEGALY: ICD-10-CM

## 2021-11-02 DIAGNOSIS — K76.0 FATTY LIVER: ICD-10-CM

## 2021-11-02 DIAGNOSIS — Z12.11 COLON CANCER SCREENING: ICD-10-CM

## 2021-11-02 DIAGNOSIS — R19.7 DIARRHEA, UNSPECIFIED TYPE: ICD-10-CM

## 2021-11-02 DIAGNOSIS — R11.0 NAUSEA: Primary | ICD-10-CM

## 2021-11-02 PROCEDURE — 3008F BODY MASS INDEX DOCD: CPT | Performed by: NURSE PRACTITIONER

## 2021-11-02 PROCEDURE — 1111F DSCHRG MED/CURRENT MED MERGE: CPT | Performed by: NURSE PRACTITIONER

## 2021-11-02 PROCEDURE — 99215 OFFICE O/P EST HI 40 MIN: CPT | Performed by: NURSE PRACTITIONER

## 2021-11-02 PROCEDURE — 3074F SYST BP LT 130 MM HG: CPT | Performed by: NURSE PRACTITIONER

## 2021-11-02 PROCEDURE — 3078F DIAST BP <80 MM HG: CPT | Performed by: NURSE PRACTITIONER

## 2021-11-02 RX ORDER — POLYETHYLENE GLYCOL 3350, SODIUM CHLORIDE, SODIUM BICARBONATE, POTASSIUM CHLORIDE 420; 11.2; 5.72; 1.48 G/4L; G/4L; G/4L; G/4L
POWDER, FOR SOLUTION ORAL
Qty: 4000 ML | Refills: 0 | Status: SHIPPED | OUTPATIENT
Start: 2021-11-02

## 2021-11-02 NOTE — TELEPHONE ENCOUNTER
Lebron Lazar,    Please see below. Patient has not scheduled colonoscopy yet, per notes it looks like she is was advised to contact Dr. Rosalina Baer to schedule. Patient has appt with CDE 11/4/21.

## 2021-11-02 NOTE — TELEPHONE ENCOUNTER
martha Guzman--    Please advise on insulin adjustment orders based on the following diet modifications prior to procedure:    Day before the procedure, patient will be on clear liquid diet only after breakfast.    Thank you!

## 2021-11-02 NOTE — PATIENT INSTRUCTIONS
-follow-up with endocrinology (good hgb a1c control)  -continue to work with dietitian  -Megan Lim as needed  -if return of nausea and/or vomiting restart reglan  -gastric emptying scan  -monitor liver function testing    1.  Schedule colonoscopy with MAC w/

## 2021-11-04 ENCOUNTER — NURSE ONLY (OUTPATIENT)
Dept: ENDOCRINOLOGY CLINIC | Facility: CLINIC | Age: 66
End: 2021-11-04
Payer: MEDICARE

## 2021-11-04 NOTE — PATIENT INSTRUCTIONS
Contact the office if you continue to experience lower blood sugars overnight. Consume a small carbohydrate snack 10-15 grams with a protein at bedtime to prevent low blood sugar.

## 2021-11-04 NOTE — PROGRESS NOTES
Chris Christianson  : 1955 attended individual  assessment for Diabetes Education:    Date: 2021   Start time: 9:30 am End time: 10:00 am    Angelica Tamikojd returns to office for evaluation of blood sugar values.   She states she is taking her insulin Nov

## 2021-11-08 NOTE — TELEPHONE ENCOUNTER
[id number] Geraldine Lombardi noted. Per CDE note, patient was having hypoglycemia episodes with 70/30 insulin: 20 units twice daily. Please call patient to review recent BG readings, this way we ensure she is taking the correct doses prior to giving GI recommendations.      Sathya Farris

## 2021-11-29 NOTE — TELEPHONE ENCOUNTER
Scheduled for follow up with martha Garcia 12/01/2021. Will follow up with endo clinical staff s/p visit to ensure pre-procedure dm adjustment orders were provided and reviewed with patient.

## 2021-12-01 ENCOUNTER — OFFICE VISIT (OUTPATIENT)
Dept: ENDOCRINOLOGY CLINIC | Facility: CLINIC | Age: 66
End: 2021-12-01
Payer: MEDICARE

## 2021-12-01 VITALS
WEIGHT: 189 LBS | DIASTOLIC BLOOD PRESSURE: 80 MMHG | SYSTOLIC BLOOD PRESSURE: 158 MMHG | HEART RATE: 69 BPM | BODY MASS INDEX: 31 KG/M2

## 2021-12-01 DIAGNOSIS — E11.65 UNCONTROLLED TYPE 2 DIABETES MELLITUS WITH HYPERGLYCEMIA (HCC): Primary | ICD-10-CM

## 2021-12-01 DIAGNOSIS — I10 ESSENTIAL HYPERTENSION: ICD-10-CM

## 2021-12-01 PROCEDURE — 3079F DIAST BP 80-89 MM HG: CPT | Performed by: NURSE PRACTITIONER

## 2021-12-01 PROCEDURE — 82947 ASSAY GLUCOSE BLOOD QUANT: CPT | Performed by: NURSE PRACTITIONER

## 2021-12-01 PROCEDURE — 3077F SYST BP >= 140 MM HG: CPT | Performed by: NURSE PRACTITIONER

## 2021-12-01 PROCEDURE — 83036 HEMOGLOBIN GLYCOSYLATED A1C: CPT | Performed by: NURSE PRACTITIONER

## 2021-12-01 PROCEDURE — 3051F HG A1C>EQUAL 7.0%<8.0%: CPT | Performed by: NURSE PRACTITIONER

## 2021-12-01 PROCEDURE — 36416 COLLJ CAPILLARY BLOOD SPEC: CPT | Performed by: NURSE PRACTITIONER

## 2021-12-01 PROCEDURE — 99214 OFFICE O/P EST MOD 30 MIN: CPT | Performed by: NURSE PRACTITIONER

## 2021-12-01 NOTE — PATIENT INSTRUCTIONS
A1C: 7.4% today --> decreased from 12.4% on 10/20/2021  Blood glucose: 148 in clinic today    Medications:   -Increase Novolin 70/30: 20 units with breakfast       20-->24  units with dinner       If your fasting blood glucose readings are >130, increase d

## 2021-12-01 NOTE — TELEPHONE ENCOUNTER
Vera Fernando--    Patient is not scheduled for colonoscopy with Dr. Diane Diane yet. Please advise on insulin adjustments once scheduled for GI procedure. Thank you!

## 2021-12-02 NOTE — TELEPHONE ENCOUNTER
[id number] Geraldine Lombardi noted. Will await for colonoscopy to be scheduled to give insulin recommendations. Please inform me when this happens. Thank you!

## 2021-12-03 ENCOUNTER — TELEPHONE (OUTPATIENT)
Dept: GASTROENTEROLOGY | Facility: CLINIC | Age: 66
End: 2021-12-03

## 2022-01-07 NOTE — TELEPHONE ENCOUNTER
GI Schedulers--    Please assist with scheduling colonoscopy per orders attached below from office visit 11/02/2021. Please forward back to GI clinical staff once scheduled to obtain insulin adjustment instructions. Thank you!!     1.  Schedule colo

## 2022-01-08 ENCOUNTER — HOSPITAL ENCOUNTER (OUTPATIENT)
Age: 67
Discharge: HOME | End: 2022-01-08
Payer: MEDICARE

## 2022-01-08 VITALS
SYSTOLIC BLOOD PRESSURE: 122 MMHG | TEMPERATURE: 97 F | HEART RATE: 83 BPM | OXYGEN SATURATION: 97 % | DIASTOLIC BLOOD PRESSURE: 70 MMHG

## 2022-01-08 DIAGNOSIS — J02.0 STREPTOCOCCAL SORE THROAT: ICD-10-CM

## 2022-01-08 DIAGNOSIS — J02.9 SORE THROAT: Primary | ICD-10-CM

## 2022-01-08 LAB
S PYO AG THROAT QL: POSITIVE
SARS-COV-2 RNA RESP QL NAA+PROBE: NOT DETECTED

## 2022-01-08 PROCEDURE — 99213 OFFICE O/P EST LOW 20 MIN: CPT | Performed by: NURSE PRACTITIONER

## 2022-01-08 PROCEDURE — 87880 STREP A ASSAY W/OPTIC: CPT | Performed by: NURSE PRACTITIONER

## 2022-01-08 PROCEDURE — U0002 COVID-19 LAB TEST NON-CDC: HCPCS | Performed by: NURSE PRACTITIONER

## 2022-01-08 RX ORDER — AMOXICILLIN 500 MG/1
500 TABLET, FILM COATED ORAL 2 TIMES DAILY
Qty: 20 TABLET | Refills: 0 | Status: SHIPPED | OUTPATIENT
Start: 2022-01-08 | End: 2022-01-18

## 2022-01-08 RX ORDER — IBUPROFEN 600 MG/1
600 TABLET ORAL EVERY 8 HOURS PRN
Qty: 30 TABLET | Refills: 0 | Status: SHIPPED | OUTPATIENT
Start: 2022-01-08 | End: 2022-01-15

## 2022-01-08 RX ORDER — FLUTICASONE PROPIONATE 50 MCG
2 SPRAY, SUSPENSION (ML) NASAL DAILY
Qty: 16 G | Refills: 0 | Status: SHIPPED | OUTPATIENT
Start: 2022-01-08 | End: 2022-02-07

## 2022-01-08 NOTE — ED PROVIDER NOTES
Patient Seen in: [facility]      History   Patient presents with:  Sore Throat  Cough/URI    Stated Complaint: ACT - RUNNY NOSE    Subjective:   HPI    78 Yo arrives to the ic with co sore throat, tolerating secretions, phonation normal, neck Pharynx: Uvula midline. Posterior oropharyngeal erythema present. No oropharyngeal exudate or uvula swelling. Eyes:      Conjunctiva/sclera: Conjunctivae normal.      Pupils: Pupils are equal, round, and reactive to light.    Pulmonary:      Effort: Pulmo The patient verbalized understanding of the discharge instructions and plan, also including, if needed, prescription drug management and or OTC drug management.      I spent a total of 11 minutes during chart review, obtaining history, performing a physical 0    fluticasone propionate 50 MCG/ACT Nasal Suspension  2 sprays by Nasal route daily.   Qty: 16 g Refills: 0

## 2022-01-08 NOTE — ED INITIAL ASSESSMENT (HPI)
Pt is fully vaccinated for COVID and has a sore throat for 1 week with multiple exposures to COVID + people.

## 2022-01-25 NOTE — TELEPHONE ENCOUNTER
Called patient to help assist with scheduling procedure.      [facility]

## 2022-01-31 NOTE — TELEPHONE ENCOUNTER
Several attempts made by phone to help assist with scheduling colonoscopy with no success. No response letter sent to patient. TE closed.

## 2022-02-09 ENCOUNTER — TELEPHONE (OUTPATIENT)
Dept: CASE MANAGEMENT | Age: 67
End: 2022-02-09

## 2022-03-02 ENCOUNTER — OFFICE VISIT (OUTPATIENT)
Dept: ENDOCRINOLOGY CLINIC | Facility: CLINIC | Age: 67
End: 2022-03-02
Payer: MEDICARE

## 2022-03-02 VITALS
HEIGHT: 65.52 IN | HEART RATE: 69 BPM | BODY MASS INDEX: 32.43 KG/M2 | DIASTOLIC BLOOD PRESSURE: 87 MMHG | WEIGHT: 197 LBS | SYSTOLIC BLOOD PRESSURE: 162 MMHG

## 2022-03-02 DIAGNOSIS — E11.65 UNCONTROLLED TYPE 2 DIABETES MELLITUS WITH HYPERGLYCEMIA (HCC): Primary | ICD-10-CM

## 2022-03-02 LAB
CARTRIDGE LOT#: ABNORMAL NUMERIC
GLUCOSE BLOOD: 168
HEMOGLOBIN A1C: 8 % (ref 4.3–5.6)
TEST STRIP LOT #: NORMAL NUMERIC

## 2022-03-02 PROCEDURE — 36416 COLLJ CAPILLARY BLOOD SPEC: CPT | Performed by: NURSE PRACTITIONER

## 2022-03-02 PROCEDURE — 83036 HEMOGLOBIN GLYCOSYLATED A1C: CPT | Performed by: NURSE PRACTITIONER

## 2022-03-02 PROCEDURE — 3079F DIAST BP 80-89 MM HG: CPT | Performed by: NURSE PRACTITIONER

## 2022-03-02 PROCEDURE — 3008F BODY MASS INDEX DOCD: CPT | Performed by: NURSE PRACTITIONER

## 2022-03-02 PROCEDURE — 99214 OFFICE O/P EST MOD 30 MIN: CPT | Performed by: NURSE PRACTITIONER

## 2022-03-02 PROCEDURE — 3052F HG A1C>EQUAL 8.0%<EQUAL 9.0%: CPT | Performed by: NURSE PRACTITIONER

## 2022-03-02 PROCEDURE — 3077F SYST BP >= 140 MM HG: CPT | Performed by: NURSE PRACTITIONER

## 2022-03-02 PROCEDURE — 82947 ASSAY GLUCOSE BLOOD QUANT: CPT | Performed by: NURSE PRACTITIONER

## 2022-03-02 NOTE — PATIENT INSTRUCTIONS
A1C: 8.0% today --> increased from 7.4% on 12/1/2021  Blood glucose: 168 in clinic today    Medications:    Increase Novolin 70/30: 20 units with breakfast           24 -->28 units with dinner     Please call or send me a [employer] msg with an update on your blood glucose readings in 1 week       Please call and schedule apt with Dr. Odalys Yang (ophthalmologist) at [phone]      Weight:  Wt Readings from Last 6 Encounters:  03/02/22 : 197 lb (89.4 kg)  12/01/21 : 189 lb (85.7 kg)  11/02/21 : 193 lb (87.5 kg)  10/20/21 : 187 lb (84.8 kg)  10/13/21 : 184 lb (83.5 kg)  10/01/21 : 186 lb (84.4 kg)    A1C goal:  <7.5%    Blood sugar testing:  Test your blood sugar 2 times daily   Recommended times to test: Before breakfast (fasting) and before dinner     Blood sugar targets:  Before breakfast:   (preferably < 110)  Before meals OR 2 hours after meals: <180 (preferably <150)     Call for persistent blood sugars < 75 or > 200.

## 2022-04-27 ENCOUNTER — HOSPITAL ENCOUNTER (OUTPATIENT)
Age: 67
Discharge: HOME | End: 2022-04-27
Payer: MEDICARE

## 2022-04-27 VITALS
DIASTOLIC BLOOD PRESSURE: 81 MMHG | HEART RATE: 81 BPM | SYSTOLIC BLOOD PRESSURE: 182 MMHG | RESPIRATION RATE: 18 BRPM | TEMPERATURE: 97 F | OXYGEN SATURATION: 99 %

## 2022-04-27 DIAGNOSIS — Z20.822 EXPOSURE TO COVID-19 VIRUS: Primary | ICD-10-CM

## 2022-04-27 LAB — SARS-COV-2 RNA RESP QL NAA+PROBE: NOT DETECTED

## 2022-04-27 PROCEDURE — U0002 COVID-19 LAB TEST NON-CDC: HCPCS | Performed by: NURSE PRACTITIONER

## 2022-04-27 PROCEDURE — 99212 OFFICE O/P EST SF 10 MIN: CPT | Performed by: NURSE PRACTITIONER

## 2022-06-22 ENCOUNTER — OFFICE VISIT (OUTPATIENT)
Dept: FAMILY MEDICINE CLINIC | Facility: CLINIC | Age: 67
End: 2022-06-22
Payer: MEDICARE

## 2022-06-22 VITALS
DIASTOLIC BLOOD PRESSURE: 78 MMHG | HEIGHT: 64.4 IN | WEIGHT: 204 LBS | BODY MASS INDEX: 34.4 KG/M2 | TEMPERATURE: 98 F | HEART RATE: 73 BPM | RESPIRATION RATE: 16 BRPM | SYSTOLIC BLOOD PRESSURE: 145 MMHG

## 2022-06-22 DIAGNOSIS — Z12.11 COLON CANCER SCREENING: ICD-10-CM

## 2022-06-22 DIAGNOSIS — Z78.0 MENOPAUSE: ICD-10-CM

## 2022-06-22 DIAGNOSIS — Z00.00 MEDICARE ANNUAL WELLNESS VISIT, SUBSEQUENT: Primary | ICD-10-CM

## 2022-06-22 DIAGNOSIS — I10 ESSENTIAL HYPERTENSION: ICD-10-CM

## 2022-06-22 DIAGNOSIS — Z12.31 VISIT FOR SCREENING MAMMOGRAM: ICD-10-CM

## 2022-06-22 DIAGNOSIS — M65.331 TRIGGER MIDDLE FINGER OF RIGHT HAND: ICD-10-CM

## 2022-06-22 DIAGNOSIS — E11.9 CONTROLLED TYPE 2 DIABETES MELLITUS WITHOUT COMPLICATION, UNSPECIFIED WHETHER LONG TERM INSULIN USE (HCC): ICD-10-CM

## 2022-06-22 DIAGNOSIS — E78.5 HYPERLIPIDEMIA, UNSPECIFIED HYPERLIPIDEMIA TYPE: ICD-10-CM

## 2022-06-22 PROBLEM — N12 PYELONEPHRITIS: Status: RESOLVED | Noted: 2021-10-01 | Resolved: 2022-06-22

## 2022-06-22 PROBLEM — R11.0 NAUSEA: Status: RESOLVED | Noted: 2021-09-17 | Resolved: 2022-06-22

## 2022-06-22 PROCEDURE — 3078F DIAST BP <80 MM HG: CPT | Performed by: FAMILY MEDICINE

## 2022-06-22 PROCEDURE — G0009 ADMIN PNEUMOCOCCAL VACCINE: HCPCS | Performed by: FAMILY MEDICINE

## 2022-06-22 PROCEDURE — 99397 PER PM REEVAL EST PAT 65+ YR: CPT | Performed by: FAMILY MEDICINE

## 2022-06-22 PROCEDURE — 96160 PT-FOCUSED HLTH RISK ASSMT: CPT | Performed by: FAMILY MEDICINE

## 2022-06-22 PROCEDURE — G0439 PPPS, SUBSEQ VISIT: HCPCS | Performed by: FAMILY MEDICINE

## 2022-06-22 PROCEDURE — 3077F SYST BP >= 140 MM HG: CPT | Performed by: FAMILY MEDICINE

## 2022-06-22 PROCEDURE — 90677 PCV20 VACCINE IM: CPT | Performed by: FAMILY MEDICINE

## 2022-06-22 PROCEDURE — 1125F AMNT PAIN NOTED PAIN PRSNT: CPT | Performed by: FAMILY MEDICINE

## 2022-06-22 PROCEDURE — 3008F BODY MASS INDEX DOCD: CPT | Performed by: FAMILY MEDICINE

## 2022-06-22 RX ORDER — ATORVASTATIN CALCIUM 40 MG/1
40 TABLET, FILM COATED ORAL EVERY MORNING
Qty: 90 TABLET | Refills: 3 | Status: SHIPPED | OUTPATIENT
Start: 2022-06-22

## 2022-06-22 RX ORDER — AMLODIPINE BESYLATE 10 MG/1
10 TABLET ORAL EVERY MORNING
Qty: 90 TABLET | Refills: 3 | Status: SHIPPED | OUTPATIENT
Start: 2022-06-22

## 2022-09-09 ENCOUNTER — OFFICE VISIT (OUTPATIENT)
Dept: FAMILY MEDICINE CLINIC | Facility: CLINIC | Age: 67
End: 2022-09-09
Payer: MEDICARE

## 2022-09-09 VITALS
HEIGHT: 64.4 IN | WEIGHT: 196 LBS | BODY MASS INDEX: 33.05 KG/M2 | RESPIRATION RATE: 18 BRPM | OXYGEN SATURATION: 96 % | HEART RATE: 78 BPM | SYSTOLIC BLOOD PRESSURE: 170 MMHG | DIASTOLIC BLOOD PRESSURE: 76 MMHG

## 2022-09-09 DIAGNOSIS — N89.8 VAGINAL DISCHARGE: Primary | ICD-10-CM

## 2022-09-09 DIAGNOSIS — B35.6 TINEA CRURIS: ICD-10-CM

## 2022-09-09 DIAGNOSIS — L02.91 ABSCESS: ICD-10-CM

## 2022-09-09 PROCEDURE — 3078F DIAST BP <80 MM HG: CPT | Performed by: PHYSICIAN ASSISTANT

## 2022-09-09 PROCEDURE — 3008F BODY MASS INDEX DOCD: CPT | Performed by: PHYSICIAN ASSISTANT

## 2022-09-09 PROCEDURE — 3077F SYST BP >= 140 MM HG: CPT | Performed by: PHYSICIAN ASSISTANT

## 2022-09-09 PROCEDURE — 1125F AMNT PAIN NOTED PAIN PRSNT: CPT | Performed by: PHYSICIAN ASSISTANT

## 2022-09-09 PROCEDURE — 99213 OFFICE O/P EST LOW 20 MIN: CPT | Performed by: PHYSICIAN ASSISTANT

## 2022-09-09 RX ORDER — HYDROCODONE BITARTRATE AND ACETAMINOPHEN 5; 325 MG/1; MG/1
1 TABLET ORAL EVERY 6 HOURS PRN
Qty: 15 TABLET | Refills: 0 | Status: SHIPPED | OUTPATIENT
Start: 2022-09-09

## 2022-09-09 RX ORDER — LISINOPRIL 20 MG/1
20 TABLET ORAL DAILY
Qty: 90 TABLET | Refills: 1 | Status: SHIPPED | OUTPATIENT
Start: 2022-09-09

## 2022-09-09 RX ORDER — FLUCONAZOLE 150 MG/1
150 TABLET ORAL DAILY
Qty: 7 TABLET | Refills: 0 | Status: SHIPPED | OUTPATIENT
Start: 2022-09-09 | End: 2022-09-16

## 2022-09-09 RX ORDER — CLINDAMYCIN HYDROCHLORIDE 300 MG/1
300 CAPSULE ORAL 3 TIMES DAILY
Qty: 30 CAPSULE | Refills: 0 | Status: SHIPPED | OUTPATIENT
Start: 2022-09-09 | End: 2022-09-19

## 2022-09-11 LAB
GENITAL VAGINOSIS SCREEN: NEGATIVE
TRICHOMONAS SCREEN: NEGATIVE

## 2022-09-16 ENCOUNTER — TELEPHONE (OUTPATIENT)
Dept: FAMILY MEDICINE CLINIC | Facility: CLINIC | Age: 67
End: 2022-09-16

## 2022-09-16 RX ORDER — ONDANSETRON 4 MG/1
TABLET, ORALLY DISINTEGRATING ORAL
Qty: 10 TABLET | Refills: 0 | OUTPATIENT
Start: 2022-09-16

## 2022-09-16 NOTE — TELEPHONE ENCOUNTER
I spoke with pt who is requesting refill of ondansatron 4 mg for nausea. Last office visit with Karthik COELHO for vaginal discharge, abscess, tinea cruris. Finished the diflucan and continues on clindamycin 300 mg three times a day, which is causing nausea and heartburn. No diarrhea. Pt admits to taking antibiotic with food and water. Antibiotic will finish Monday 19th, 2022. Tasked to Kirk Cook PA-C to advise further on antiemetic.   Please reply to rivera: ALEN Roque Erps

## 2022-09-17 RX ORDER — ONDANSETRON 4 MG/1
4 TABLET, FILM COATED ORAL EVERY 8 HOURS PRN
Qty: 20 TABLET | Refills: 0 | Status: SHIPPED | OUTPATIENT
Start: 2022-09-17

## 2022-09-19 NOTE — TELEPHONE ENCOUNTER
Called [facility] and spoke with Mami Espinosa, confirmed that patient already picked up the medication 9/18/22.

## 2022-10-25 ENCOUNTER — TELEPHONE (OUTPATIENT)
Dept: FAMILY MEDICINE CLINIC | Facility: CLINIC | Age: 67
End: 2022-10-25

## 2023-03-20 ENCOUNTER — HOSPITAL ENCOUNTER (OUTPATIENT)
Age: 68
Discharge: HOME | End: 2023-03-20
Payer: MEDICARE

## 2023-03-20 VITALS
SYSTOLIC BLOOD PRESSURE: 173 MMHG | DIASTOLIC BLOOD PRESSURE: 75 MMHG | RESPIRATION RATE: 20 BRPM | HEART RATE: 95 BPM | OXYGEN SATURATION: 98 % | TEMPERATURE: 97 F

## 2023-03-20 DIAGNOSIS — H60.392 OTHER INFECTIVE ACUTE OTITIS EXTERNA OF LEFT EAR: Primary | ICD-10-CM

## 2023-03-20 PROCEDURE — 99213 OFFICE O/P EST LOW 20 MIN: CPT | Performed by: NURSE PRACTITIONER

## 2023-03-20 RX ORDER — CEPHALEXIN 500 MG/1
1000 CAPSULE ORAL 2 TIMES DAILY
Qty: 28 CAPSULE | Refills: 0 | Status: SHIPPED | OUTPATIENT
Start: 2023-03-20 | End: 2023-03-27

## 2023-03-20 NOTE — ED INITIAL ASSESSMENT (HPI)
Pt c/o left ear pain, which started thursday. Hx diabetes, htn.  Patient states her primary care provider took her off b/p meds

## 2023-04-06 ENCOUNTER — TELEPHONE (OUTPATIENT)
Dept: FAMILY MEDICINE CLINIC | Facility: CLINIC | Age: 68
End: 2023-04-06

## 2023-04-06 NOTE — TELEPHONE ENCOUNTER
Left Message on pt voicemail to completed her lab from 6/22/22 and her DM eye exam Referral Dm eye exam .

## 2023-05-01 ENCOUNTER — TELEPHONE (OUTPATIENT)
Dept: FAMILY MEDICINE CLINIC | Facility: CLINIC | Age: 68
End: 2023-05-01

## 2023-05-01 NOTE — TELEPHONE ENCOUNTER
Left message on pt voicemail to call to schedule her health maintenance Physical with Dr. Jessica Galicia.

## 2023-08-30 NOTE — PROGRESS NOTES
Name: Haydee Reyes  Date: 12/1/2021    CHIEF COMPLAINT   No chief complaint on file. HISTORY OF PRESENT ILLNESS   Haydee Reyes is a 77year old female who presents for follow up on diabetes management.  Patient has been feeling well and has been Negative for:  Visual changes, proptosis, blurring  ENT: Negative for:  dysphagia, neck swelling, dysphonia  Respiratory: Negative for: hemoptysis, shortness of breath, cough, or dyspnea.   Cardiovascular: Negative for:  chest pain, chest discomfort, palpit hydroCHLOROthiazide 25 MG Oral Tab, Take 25 mg by mouth every morning.  (Patient not taking: No sig reported), Disp: , Rfl:     ALLERGIES:   No Known Allergies    SOCIAL HISTORY:   Social History    Socioeconomic History      Marital status:     [facility] verbalizes understanding of the importance of glycemic control and the goals of therapy.    Per ADA 2021 guidelines, it is recommended that older adults (age 72 and above) who are healthy with few coexisting chronic illnesses, intact cognitive and functiona with the patient today. questions were also answered to the best of my knowledge. Patient verbalizes understanding of these issues and agrees to the plan.     A total of 30 minutes was spent on obtaining history, reviewing pertinent labs, evaluating patient Assistance with ambulation/Assistance OOB with selected safe patient handling equipment/Communicate Risk of Fall with Harm to all staff/Monitor gait and stability/Reinforce activity limits and safety measures with patient and family/Sit up slowly, dangle for a short time, stand at bedside before walking/Tailored Fall Risk Interventions/Visual Cue: Yellow wristband and red socks/Bed in lowest position, wheels locked, appropriate side rails in place/Call bell, personal items and telephone in reach/Instruct patient to call for assistance before getting out of bed or chair/Non-slip footwear when patient is out of bed/[place] to call system/Physically safe environment - no spills, clutter or unnecessary equipment/Purposeful Proactive Rounding/Room/bathroom lighting operational, light cord in reach

## 2023-10-25 ENCOUNTER — TELEPHONE (OUTPATIENT)
Dept: FAMILY MEDICINE CLINIC | Facility: CLINIC | Age: 68
End: 2023-10-25

## 2023-10-30 ENCOUNTER — TELEPHONE (OUTPATIENT)
Dept: FAMILY MEDICINE CLINIC | Facility: CLINIC | Age: 68
End: 2023-10-30

## 2023-11-17 ENCOUNTER — TELEPHONE (OUTPATIENT)
Dept: FAMILY MEDICINE CLINIC | Facility: CLINIC | Age: 68
End: 2023-11-17

## 2023-11-17 NOTE — TELEPHONE ENCOUNTER
[place], Patient mail box is full. No able leave message patient mail box is full. Reminder letter will be sent.

## 2023-12-17 ENCOUNTER — APPOINTMENT (OUTPATIENT)
Dept: GENERAL RADIOLOGY | Facility: HOSPITAL | Age: 68
End: 2023-12-17
Attending: EMERGENCY MEDICINE
Payer: MEDICARE

## 2023-12-17 ENCOUNTER — HOSPITAL ENCOUNTER (EMERGENCY)
Facility: HOSPITAL | Age: 68
Discharge: HOME | End: 2023-12-17
Attending: EMERGENCY MEDICINE
Payer: MEDICARE

## 2023-12-17 ENCOUNTER — APPOINTMENT (OUTPATIENT)
Dept: CT IMAGING | Facility: HOSPITAL | Age: 68
End: 2023-12-17
Attending: EMERGENCY MEDICINE
Payer: MEDICARE

## 2023-12-17 VITALS
HEART RATE: 77 BPM | WEIGHT: 160 LBS | HEIGHT: 65 IN | DIASTOLIC BLOOD PRESSURE: 65 MMHG | TEMPERATURE: 98 F | RESPIRATION RATE: 18 BRPM | SYSTOLIC BLOOD PRESSURE: 147 MMHG | OXYGEN SATURATION: 94 % | BODY MASS INDEX: 26.66 KG/M2
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
## 2023-12-17 DIAGNOSIS — S42.254A CLOSED NONDISPLACED FRACTURE OF GREATER TUBEROSITY OF RIGHT HUMERUS, INITIAL ENCOUNTER: ICD-10-CM

## 2023-12-17 DIAGNOSIS — S02.2XXA CLOSED FRACTURE OF NASAL BONE, INITIAL ENCOUNTER: ICD-10-CM

## 2023-12-17 LAB — GLUCOSE BLDC GLUCOMTR-MCNC: 377 MG/DL (ref 70–99)

## 2023-12-17 PROCEDURE — 70450 CT HEAD/BRAIN W/O DYE: CPT | Performed by: EMERGENCY MEDICINE

## 2023-12-17 PROCEDURE — 82962 GLUCOSE BLOOD TEST: CPT

## 2023-12-17 PROCEDURE — 73060 X-RAY EXAM OF HUMERUS: CPT | Performed by: EMERGENCY MEDICINE

## 2023-12-17 PROCEDURE — 70486 CT MAXILLOFACIAL W/O DYE: CPT | Performed by: EMERGENCY MEDICINE

## 2023-12-17 PROCEDURE — 99284 EMERGENCY DEPT VISIT MOD MDM: CPT

## 2023-12-17 PROCEDURE — 99285 EMERGENCY DEPT VISIT HI MDM: CPT

## 2023-12-17 RX ORDER — PSEUDOEPHEDRINE HCL 120 MG/1
120 TABLET, FILM COATED, EXTENDED RELEASE ORAL EVERY 12 HOURS PRN
Qty: 10 TABLET | Refills: 0 | Status: SHIPPED | OUTPATIENT
Start: 2023-12-17 | End: 2024-01-16

## 2023-12-17 RX ORDER — TRAMADOL HYDROCHLORIDE 50 MG/1
50 TABLET ORAL EVERY 6 HOURS PRN
Qty: 10 TABLET | Refills: 0 | Status: SHIPPED | OUTPATIENT
Start: 2023-12-17 | End: 2023-12-24

## 2023-12-17 NOTE — ED INITIAL ASSESSMENT (HPI)
Mechanical fall, fell straight on her face then right shoulder, rates pain 9/10. Given nitrous oxide. Lac noted to her nose and guarding the shoulder. CMS intact.

## 2023-12-18 ENCOUNTER — TELEPHONE (OUTPATIENT)
Dept: ORTHOPEDICS CLINIC | Facility: CLINIC | Age: 68
End: 2023-12-18

## 2023-12-18 ENCOUNTER — HOSPITAL ENCOUNTER (OUTPATIENT)
Age: 68
Discharge: HOME | End: 2023-12-18
Payer: MEDICARE

## 2023-12-18 VITALS
OXYGEN SATURATION: 96 % | RESPIRATION RATE: 18 BRPM | TEMPERATURE: 99 F | SYSTOLIC BLOOD PRESSURE: 126 MMHG | DIASTOLIC BLOOD PRESSURE: 77 MMHG | HEART RATE: 92 BPM
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
## 2023-12-18 PROCEDURE — 99212 OFFICE O/P EST SF 10 MIN: CPT | Performed by: NURSE PRACTITIONER

## 2023-12-18 NOTE — DISCHARGE INSTRUCTIONS
Call your primary care doctor for assistance in medication/RX changes  Call ER  [phone] if questions or need of assisstance

## 2023-12-18 NOTE — ED INITIAL ASSESSMENT (HPI)
Pt with mechanical fall yesterday. Seen and evaluated in ER yesterday. Here for follow up, states is unable to see specialist until Jan 3rd.  Also requesting insulin refill

## 2023-12-18 NOTE — TELEPHONE ENCOUNTER
Per pt states she was seen at [address] [place] [address] ED yesterday due to humerus fracture, was told to be seen this week. Please advise thank you.

## 2023-12-20 ENCOUNTER — TELEPHONE (OUTPATIENT)
Dept: ORTHOPEDICS CLINIC | Facility: CLINIC | Age: 68
End: 2023-12-20

## 2023-12-20 ENCOUNTER — TELEPHONE (OUTPATIENT)
Dept: FAMILY MEDICINE CLINIC | Facility: CLINIC | Age: 68
End: 2023-12-20

## 2023-12-20 DIAGNOSIS — S42.254D CLOSED NONDISPLACED FRACTURE OF GREATER TUBEROSITY OF RIGHT HUMERUS WITH ROUTINE HEALING, SUBSEQUENT ENCOUNTER: Primary | ICD-10-CM

## 2023-12-20 NOTE — TELEPHONE ENCOUNTER
S/w patient and offered 12/21/23 appointment at 2:45 with Dr Mtz. She accepted. Patient had fall and broke right shoulder. Xrays in system and patient given sling in UC. She had no other questions at this time

## 2023-12-21 ENCOUNTER — OFFICE VISIT (OUTPATIENT)
Dept: ORTHOPEDICS CLINIC | Facility: CLINIC | Age: 68
End: 2023-12-21

## 2023-12-21 ENCOUNTER — PATIENT OUTREACH (OUTPATIENT)
Dept: CASE MANAGEMENT | Age: 68
End: 2023-12-21

## 2023-12-21 DIAGNOSIS — S42.254A CLOSED NONDISPLACED FRACTURE OF GREATER TUBEROSITY OF RIGHT HUMERUS, INITIAL ENCOUNTER: Primary | ICD-10-CM

## 2023-12-21 PROCEDURE — 1159F MED LIST DOCD IN RCRD: CPT | Performed by: ORTHOPAEDIC SURGERY

## 2023-12-21 PROCEDURE — 99204 OFFICE O/P NEW MOD 45 MIN: CPT | Performed by: ORTHOPAEDIC SURGERY

## 2023-12-21 PROCEDURE — 1160F RVW MEDS BY RX/DR IN RCRD: CPT | Performed by: ORTHOPAEDIC SURGERY

## 2023-12-21 PROCEDURE — 23620 CLTX GR HMRL TBRS FX WO MNPJ: CPT | Performed by: ORTHOPAEDIC SURGERY

## 2023-12-21 RX ORDER — TRAMADOL HYDROCHLORIDE 50 MG/1
50 TABLET ORAL EVERY 6 HOURS PRN
Qty: 20 TABLET | Refills: 0 | Status: SHIPPED | OUTPATIENT
Start: 2023-12-21

## 2023-12-21 NOTE — PROGRESS NOTES
1st attempt ER f/up apt request  No answer, LVMTCB to schedule apts  PCP -unable to contact  ORTHO -existing apt (12/21)  MIKE -unable to contact  Closing encounter

## 2023-12-21 NOTE — TELEPHONE ENCOUNTER
Referral request noted. Referral approved, signed and sent to [facility].

## 2023-12-21 NOTE — PROGRESS NOTES
NURSING INTAKE COMMENTS:   Chief Complaint   Patient presents with    Fracture     Consult Right humerus and nose fx visit to ED 12/17/23 and UC 12/18/23 due to fall at the [facility] on 11/17/23. Pain 9/10, no numbness or tingling sensation. Has XR in Epic. HPI: This 76year old female presents today with complaints of right shoulder pain after injury. [place]. She landed directly on the right shoulder. She had no prior problems with the shoulder although she did have a rotator cuff repair about 15 years ago. She had no residual symptoms related to the shoulder prior to this injury. No numbness or tingling. She did injure her nose and face at the time of the fall. No other injuries to the extremities or back. Past Medical History:   Diagnosis Date    Diabetes ([address]     High blood pressure     Hyperlipidemia      Past Surgical History:   Procedure Laterality Date    HYSTERECTOMY       Current Outpatient Medications   Medication Sig Dispense Refill    PSEUDOEPHEDRINE  MG Oral Tablet 12 Hr Take 1 tablet (120 mg total) by mouth every 12 (twelve) hours as needed for congestion. 10 tablet 0    traMADol 50 MG Oral Tab Take 1 tablet (50 mg total) by mouth every 6 (six) hours as needed. 10 tablet 0    PSEUDOEPHEDRINE  MG Oral Tablet 12 Hr Take 1 tablet (120 mg total) by mouth every 12 (twelve) hours as needed for congestion. 10 tablet 0    traMADol 50 MG Oral Tab Take 1 tablet (50 mg total) by mouth every 6 (six) hours as needed. 10 tablet 0    neomycin-polymyxin-hydrocortisone 3.5-17284-3 Otic Solution Place 3 drops into the right ear 3 (three) times daily. 10 mL 0    ondansetron (ZOFRAN) 4 mg tablet Take 1 tablet (4 mg total) by mouth every 8 (eight) hours as needed for Nausea. 20 tablet 0    lisinopril 20 MG Oral Tab Take 1 tablet (20 mg total) by mouth daily. 90 tablet 1    amLODIPine 10 MG Oral Tab Take 1 tablet (10 mg total) by mouth every morning.  [address] tablet 3    atorvastatin 40 MG Oral Tab Take 1 tablet (40 mg total) by mouth every morning. 90 tablet 3    Insulin NPH & Regular (NOVOLIN 70/30 FLEXPEN RELION) (70-30) 100 UNIT/ML Subcutaneous Suspension Pen-injector Inject 12 Units into the skin 2 (two) times a day. 1 each 0    Blood Glucose Monitoring Suppl (ONETOUCH ULTRA MINI) w/Device Does not apply Kit To use as directed.  1 kit 0    Glucose Blood (ONETOUCH ULTRA) In Vitro Strip To check blood sugars up to twice per day 100 strip 2     No Known Allergies  Family History   Problem Relation Age of Onset    Diabetes Father     Hypertension Father     Hypertension Mother        Social History     Occupational History    Not on file   Tobacco Use    Smoking status: Never    Smokeless tobacco: Never   Substance and Sexual Activity    Alcohol use: Never    Drug use: Never    Sexual activity: Not on file        Review of Systems:  GENERAL: denies fevers, chills, night sweats, fatigue, unintentional weight loss/gain  SKIN: denies skin lesions, open sores, rash  HEENT:denies recent vision change, new nasal congestion,hearing loss, tinnitus, sore throat, headaches  RESPIRATORY: denies new shortness of breath, cough, asthma, wheezing  CARDIOVASCULAR: denies chest pain, leg cramps with exertion, palpitations, leg swelling  GI: denies abdominal pain, nausea, vomiting, diarrhea, constipation, hematochezia, worsening heartburn or stomach ulcers  : denies dysuria, hematuria, incontinence, increased frequency, urgency, difficulty urinating  MUSCULOSKELETAL: denies musculoskeletal complaints other than in HPI  NEURO: denies numbness, tingling, weakness, balance issues, dizziness, memory loss  PSYCHIATRIC: denies Hx of depression, anxiety, other psychiatric disorders  HEMATOLOGIC: denies blood clots, anemia, blood clotting disorders, blood transfusion  ENDOCRINE: denies autoimmune disease, thyroid issues, or diabetes  ALLERGY: denies asthma, seasonal allergies    Physical Examination:    There were no vitals taken for this visit. Constitutional: appears well hydrated, alert and responsive, no acute distress noted  Extremities: Left shoulder diffusely tender to palpation. Mild ecchymosis. Mainly tender over the lateral proximal arm and shoulder area. Neurological: Right hand light touch and pinprick sensory exam normal. Lateral shoulder light touch and pinprick sensory examination normal.  strength,wrist extension, biceps, triceps, and shoulder abduction strength 5 out of 5. Jan sign negative. No obvious atrophy of the hand. Imaging:   CT FACIAL BONES (EJY=06223)    Result Date: 12/17/2023  PROCEDURE: CT FACIAL BONES (CPT=70486)  COMPARISON: None. INDICATIONS: fall  TECHNIQUE:   CT images were obtained without intravenous contrast.  Automated exposure control for dose reduction was used. Dose information is transmitted to the [facility] [employer] of Radiology) NRDR ([address]) which includes  the Dose Index Registry. FINDINGS:  Mildly displaced bilateral nasal fractures. No other fractures    No blood in the sinuses  Intact symmetric orbits with no retro-orbital stranding  Well-aligned TMJs         CONCLUSION: Mildly displaced nasal fractures    Dictated by (CST): Ken Banks MD on 12/17/2023 at 4:42 PM     Finalized by (CST): Ken Banks MD on 12/17/2023 at 4:44 PM          CT BRAIN OR HEAD (82950)    Result Date: 12/17/2023  PROCEDURE: CT BRAIN OR HEAD (CPT=70450)  COMPARISON: None. INDICATIONS: fall  TECHNIQUE: CT images were obtained without contrast material.  Automated exposure control for dose reduction was used. Dose information is transmitted to the [facility] [employer] of Radiology) NRDR ([address]) which includes the Dose Index Registry.   FINDINGS:  No skull fracture or scalp hematoma  Normal midline ventricles  No hemorrhage or mass effect or edema         CONCLUSION: No acute finding Dictated by (CST): Myah Hurtado MD on 12/17/2023 at 4:41 PM     Finalized by (CST): Myah Hurtado MD on 12/17/2023 at 4:42 PM          XR HUMERUS (MIN 2 VIEWS), RIGHT (CPT=73060)    Result Date: 12/17/2023  PROCEDURE: XR HUMERUS (MIN 2 VIEWS), RIGHT (CPT=73060)  COMPARISON: None. INDICATIONS: Right arm pain post fall. Unable to move arm on its own. TECHNIQUE: Two views FINDINGS: Normal alignment. Nondisplaced fractures involving the greater tuberosity. CONCLUSION: Nondisplaced fractures involving the greater tuberosity    Dictated by (CST): Myah Hurtado MD on 12/17/2023 at 4:08 PM     Finalized by (CST): Myah Hurtado MD on 12/17/2023 at 4:09 PM             Labs:  Lab Results   Component Value Date    WBC 9.0 10/07/2021    HGB 10.8 (L) 10/07/2021    .0 10/07/2021      Lab Results   Component Value Date     (H) 10/07/2021    BUN 9 10/07/2021    CREATSERUM 0.97 10/07/2021    GFRNAA 61 10/07/2021    GFRAA 70 10/07/2021        Assessment and Plan:  Diagnoses and all orders for this visit:    Closed nondisplaced fracture of greater tuberosity of right humerus, initial encounter        Assessment: Right humerus greater tuberosity fracture, nondisplaced    Plan: I recommend nonoperative treatment of this injury. We provided a new sling to be worn full-time. Encouraged Codman type exercises. Suggested sleeping in the upright position. Advised icing and oral anti-inflammatory medication or Tylenol for pain. Follow-up x-rays in 3 weeks. Her  was present for the visit today. Follow Up: Return in about 3 weeks (around 1/11/2024).     Jose Alfredo Light MD

## 2024-01-08 ENCOUNTER — OFFICE VISIT (OUTPATIENT)
Dept: OTOLARYNGOLOGY | Facility: CLINIC | Age: 69
End: 2024-01-08

## 2024-01-08 DIAGNOSIS — S02.2XXA CLOSED FRACTURE OF NASAL BONE, INITIAL ENCOUNTER: Primary | ICD-10-CM

## 2024-01-08 PROCEDURE — 1160F RVW MEDS BY RX/DR IN RCRD: CPT | Performed by: STUDENT IN AN ORGANIZED HEALTH CARE EDUCATION/TRAINING PROGRAM

## 2024-01-08 PROCEDURE — 99203 OFFICE O/P NEW LOW 30 MIN: CPT | Performed by: STUDENT IN AN ORGANIZED HEALTH CARE EDUCATION/TRAINING PROGRAM

## 2024-01-08 PROCEDURE — 1159F MED LIST DOCD IN RCRD: CPT | Performed by: STUDENT IN AN ORGANIZED HEALTH CARE EDUCATION/TRAINING PROGRAM

## 2024-01-08 NOTE — PROGRESS NOTES
Nely Saravia is a 68 year old female.   Chief Complaint   Patient presents with    Nose Problem     Patient is here due to broken nose 12/17/23       ASSESSMENT AND PLAN:   1. Closed fracture of nasal bone, initial encounter  68-year-old presents in follow-up regarding a fracture of her nose she experienced a fall while walking at the mall on December 17.      CT of her face demonstrated a bilateral mildly displaced nasal bone fracture.  She is not concerned with obstruction or cosmetic appearance    Does have a slight depression of her right lateral nasal dorsum consistent with the CT scan.  No septal hematoma    She does have a bilateral nasal bone fracture worse on the right slight cosmetic defect although she is not concerned about this.  Will forego surgery especially given her age and risk of anesthesia.  Discussed that this pain will take 4 to 6 weeks and she can use ice and Tylenol or ibuprofen.  She was understanding      The patient indicates understanding of these issues and agrees to the plan.      EXAM:   There were no vitals taken for this visit.    Pertinent exam findings may also be noted above in assessment and plan     System Details   Skin Inspection - Normal.   Constitutional Overall appearance - Normal.   Head/Face Symmetric, TMJ tenderness not present    Eyes EOMI, PERRL   Right ear:  Canal clear, TM intact, no CLEMENTINA   Left ear:  Canal clear, TM intact, no CLEMENTINA   Nose: Septum midline, inferior turbinates not enlarged, nasal valves without collapse    Oral cavity/Oropharynx: No lesions or masses on inspection or palpation, tonsils symmetric    Neck: Soft without LAD, thyroid not enlarged  Voice clear/ no stridor   Other:      Scopes and Procedures:             Current Outpatient Medications   Medication Sig Dispense Refill    traMADol 50 MG Oral Tab Take 1 tablet (50 mg total) by mouth every 6 (six) hours as needed for Pain. No alcohol or driving on this med. Stop if lethargic or hallucinating.  20 tablet 0    PSEUDOEPHEDRINE  MG Oral Tablet 12 Hr Take 1 tablet (120 mg total) by mouth every 12 (twelve) hours as needed for congestion. 10 tablet 0    PSEUDOEPHEDRINE  MG Oral Tablet 12 Hr Take 1 tablet (120 mg total) by mouth every 12 (twelve) hours as needed for congestion. 10 tablet 0    neomycin-polymyxin-hydrocortisone 3.5-17908-4 Otic Solution Place 3 drops into the right ear 3 (three) times daily. 10 mL 0    ondansetron (ZOFRAN) 4 mg tablet Take 1 tablet (4 mg total) by mouth every 8 (eight) hours as needed for Nausea. 20 tablet 0    lisinopril 20 MG Oral Tab Take 1 tablet (20 mg total) by mouth daily. 90 tablet 1    amLODIPine 10 MG Oral Tab Take 1 tablet (10 mg total) by mouth every morning. 90 tablet 3    atorvastatin 40 MG Oral Tab Take 1 tablet (40 mg total) by mouth every morning. 90 tablet 3    Insulin NPH & Regular (NOVOLIN 70/30 FLEXPEN RELION) (70-30) 100 UNIT/ML Subcutaneous Suspension Pen-injector Inject 12 Units into the skin 2 (two) times a day. 1 each 0    Blood Glucose Monitoring Suppl (ONETOUCH ULTRA MINI) w/Device Does not apply Kit To use as directed. 1 kit 0    Glucose Blood (ONETOUCH ULTRA) In Vitro Strip To check blood sugars up to twice per day 100 strip 2      Past Medical History:   Diagnosis Date    Diabetes (HCC)     High blood pressure     Hyperlipidemia       Social History:  Social History     Socioeconomic History    Marital status:    Tobacco Use    Smoking status: Never    Smokeless tobacco: Never   Vaping Use    Vaping Use: Never used   Substance and Sexual Activity    Alcohol use: Never    Drug use: Never          Wild Wayne MD  1/8/2024  4:28 PM

## 2024-01-11 ENCOUNTER — HOSPITAL ENCOUNTER (OUTPATIENT)
Dept: GENERAL RADIOLOGY | Facility: HOSPITAL | Age: 69
Discharge: HOME | End: 2024-01-11
Attending: ORTHOPAEDIC SURGERY
Payer: MEDICARE

## 2024-01-11 ENCOUNTER — OFFICE VISIT (OUTPATIENT)
Dept: ORTHOPEDICS CLINIC | Facility: CLINIC | Age: 69
End: 2024-01-11
Payer: MEDICARE

## 2024-01-11 DIAGNOSIS — S42.254D CLOSED NONDISPLACED FRACTURE OF GREATER TUBEROSITY OF RIGHT HUMERUS WITH ROUTINE HEALING, SUBSEQUENT ENCOUNTER: ICD-10-CM

## 2024-01-11 DIAGNOSIS — S42.254A CLOSED NONDISPLACED FRACTURE OF GREATER TUBEROSITY OF RIGHT HUMERUS, INITIAL ENCOUNTER: ICD-10-CM

## 2024-01-11 DIAGNOSIS — S42.254A CLOSED NONDISPLACED FRACTURE OF GREATER TUBEROSITY OF RIGHT HUMERUS, INITIAL ENCOUNTER: Primary | ICD-10-CM

## 2024-01-11 PROCEDURE — 73060 X-RAY EXAM OF HUMERUS: CPT | Performed by: ORTHOPAEDIC SURGERY

## 2024-01-11 PROCEDURE — 99024 POSTOP FOLLOW-UP VISIT: CPT

## 2024-01-11 PROCEDURE — 1159F MED LIST DOCD IN RCRD: CPT

## 2024-01-11 PROCEDURE — 1160F RVW MEDS BY RX/DR IN RCRD: CPT

## 2024-01-11 NOTE — PROGRESS NOTES
NURSING INTAKE COMMENTS:   Chief Complaint   Patient presents with    Fracture     R Humerus fx f/u - Pt states healing well. Rates pain 8/10  - pain worse with movement. Pt wearing sling.       HPI: This 68 year old female presents today with complaints of right shoulder follow up. Patient is one month post injury with right humeral head fracture. States she is doing well today. She is only experiencing pain when moving the arm. She has been taking Tylenol and Aleve for the shoulder. She has not been icing. She has been wearing the sling consistently since last seen in office.     Past Medical History:   Diagnosis Date    Diabetes (HCC)     High blood pressure     Hyperlipidemia      Past Surgical History:   Procedure Laterality Date    HYSTERECTOMY       Current Outpatient Medications   Medication Sig Dispense Refill    traMADol 50 MG Oral Tab Take 1 tablet (50 mg total) by mouth every 6 (six) hours as needed for Pain. No alcohol or driving on this med. Stop if lethargic or hallucinating. 20 tablet 0    PSEUDOEPHEDRINE  MG Oral Tablet 12 Hr Take 1 tablet (120 mg total) by mouth every 12 (twelve) hours as needed for congestion. 10 tablet 0    PSEUDOEPHEDRINE  MG Oral Tablet 12 Hr Take 1 tablet (120 mg total) by mouth every 12 (twelve) hours as needed for congestion. 10 tablet 0    neomycin-polymyxin-hydrocortisone 3.5-59373-1 Otic Solution Place 3 drops into the right ear 3 (three) times daily. 10 mL 0    ondansetron (ZOFRAN) 4 mg tablet Take 1 tablet (4 mg total) by mouth every 8 (eight) hours as needed for Nausea. 20 tablet 0    lisinopril 20 MG Oral Tab Take 1 tablet (20 mg total) by mouth daily. 90 tablet 1    amLODIPine 10 MG Oral Tab Take 1 tablet (10 mg total) by mouth every morning. 90 tablet 3    atorvastatin 40 MG Oral Tab Take 1 tablet (40 mg total) by mouth every morning. 90 tablet 3    Insulin NPH & Regular (NOVOLIN 70/30 FLEXPEN RELION) (70-30) 100 UNIT/ML Subcutaneous Suspension  Pen-injector Inject 12 Units into the skin 2 (two) times a day. 1 each 0    Blood Glucose Monitoring Suppl (ONETOUCH ULTRA MINI) w/Device Does not apply Kit To use as directed. 1 kit 0    Glucose Blood (ONETOUCH ULTRA) In Vitro Strip To check blood sugars up to twice per day 100 strip 2     No Known Allergies  Family History   Problem Relation Age of Onset    Diabetes Father     Hypertension Father     Hypertension Mother        Social History     Occupational History    Not on file   Tobacco Use    Smoking status: Never    Smokeless tobacco: Never   Vaping Use    Vaping Use: Never used   Substance and Sexual Activity    Alcohol use: Never    Drug use: Never    Sexual activity: Not on file        Review of Systems:  GENERAL: denies fevers, chills, night sweats, fatigue, unintentional weight loss/gain  SKIN: denies skin lesions, open sores, rash  HEENT:denies recent vision change, new nasal congestion,hearing loss, tinnitus, sore throat, headaches  RESPIRATORY: denies new shortness of breath, cough, asthma, wheezing  CARDIOVASCULAR: denies chest pain, leg cramps with exertion, palpitations, leg swelling  GI: denies abdominal pain, nausea, vomiting, diarrhea, constipation, hematochezia, worsening heartburn or stomach ulcers  : denies dysuria, hematuria, incontinence, increased frequency, urgency, difficulty urinating  MUSCULOSKELETAL: denies musculoskeletal complaints other than in HPI  NEURO: denies numbness, tingling, weakness, balance issues, dizziness, memory loss  PSYCHIATRIC: denies Hx of depression, anxiety, other psychiatric disorders  HEMATOLOGIC: denies blood clots, anemia, blood clotting disorders, blood transfusion  ENDOCRINE: denies autoimmune disease, thyroid issues, or diabetes  ALLERGY: denies asthma, seasonal allergies    Physical Examination:    There were no vitals taken for this visit.  Constitutional: appears well hydrated, alert and responsive, no acute distress noted  Extremities: Right  shoulder is mildly tender to palpation. No swelling in the right upper extremity. No numbness.   Neurological: Unchanged     Imaging:   CT FACIAL BONES (CPT=70486)    Result Date: 12/17/2023  PROCEDURE: CT FACIAL BONES (CPT=70486)  COMPARISON: None.  INDICATIONS: fall  TECHNIQUE:   CT images were obtained without intravenous contrast.  Automated exposure control for dose reduction was used.  Dose information is transmitted to the ACR (American College of Radiology) NRDR (National Radiology Data Registry) which includes  the Dose Index Registry.   FINDINGS:  Mildly displaced bilateral nasal fractures.  No other fractures    No blood in the sinuses  Intact symmetric orbits with no retro-orbital stranding  Well-aligned TMJs         CONCLUSION: Mildly displaced nasal fractures    Dictated by (CST): Nabor Kaba MD on 12/17/2023 at 4:42 PM     Finalized by (CST): Nabor Kaba MD on 12/17/2023 at 4:44 PM          CT BRAIN OR HEAD (56215)    Result Date: 12/17/2023  PROCEDURE: CT BRAIN OR HEAD (CPT=70450)  COMPARISON: None.  INDICATIONS: fall  TECHNIQUE: CT images were obtained without contrast material.  Automated exposure control for dose reduction was used.  Dose information is transmitted to the ACR (American College of Radiology) NRDR (National Radiology Data Registry) which includes the Dose Index Registry.  FINDINGS:  No skull fracture or scalp hematoma  Normal midline ventricles  No hemorrhage or mass effect or edema         CONCLUSION: No acute finding    Dictated by (CST): Nabor Kaba MD on 12/17/2023 at 4:41 PM     Finalized by (CST): Nabor Kaba MD on 12/17/2023 at 4:42 PM          XR HUMERUS (MIN 2 VIEWS), RIGHT (CPT=73060)    Result Date: 12/17/2023  PROCEDURE: XR HUMERUS (MIN 2 VIEWS), RIGHT (CPT=73060)  COMPARISON: None.  INDICATIONS: Right arm pain post fall. Unable to move arm on its own.  TECHNIQUE: Two views FINDINGS: Normal alignment.  Nondisplaced fractures involving the greater tuberosity.          CONCLUSION: Nondisplaced fractures involving the greater tuberosity    Dictated by (CST): Nabor Kaba MD on 12/17/2023 at 4:08 PM     Finalized by (CST): Nabor Kaba MD on 12/17/2023 at 4:09 PM             Labs:  Lab Results   Component Value Date    WBC 9.0 10/07/2021    HGB 10.8 (L) 10/07/2021    .0 10/07/2021      Lab Results   Component Value Date     (H) 10/07/2021    BUN 9 10/07/2021    CREATSERUM 0.97 10/07/2021    GFRNAA 61 10/07/2021    GFRAA 70 10/07/2021        Assessment and Plan:  Diagnoses and all orders for this visit:    Closed nondisplaced fracture of greater tuberosity of right humerus, initial encounter  -     XR HUMERUS (MIN 2 VIEWS), RIGHT (CPT=73060); Future  -     PHYSICAL THERAPY - INTERNAL    Closed nondisplaced fracture of greater tuberosity of right humerus with routine healing, subsequent encounter        Assessment: Healing right humeral tuberosity fracture     Plan: I recommend continued nonoperative treatment. Patient may discontinue sling at this time. Continue Tylenol and incorporate icing as needed for pain. Physical therapy referral provided today. Accompanied by . Follow up in four weeks with new x-rays.    Follow Up: Return in about 4 weeks (around 2/8/2024).    Cristina Tamayo PA-C

## 2024-01-17 ENCOUNTER — OFFICE VISIT (OUTPATIENT)
Dept: FAMILY MEDICINE CLINIC | Facility: CLINIC | Age: 69
End: 2024-01-17

## 2024-01-17 VITALS
RESPIRATION RATE: 16 BRPM | TEMPERATURE: 98 F | BODY MASS INDEX: 25.66 KG/M2 | DIASTOLIC BLOOD PRESSURE: 80 MMHG | WEIGHT: 154 LBS | HEART RATE: 97 BPM | SYSTOLIC BLOOD PRESSURE: 139 MMHG | HEIGHT: 65 IN

## 2024-01-17 DIAGNOSIS — E78.5 HYPERLIPIDEMIA, UNSPECIFIED HYPERLIPIDEMIA TYPE: ICD-10-CM

## 2024-01-17 DIAGNOSIS — Z12.11 COLON CANCER SCREENING: ICD-10-CM

## 2024-01-17 DIAGNOSIS — Z12.31 VISIT FOR SCREENING MAMMOGRAM: ICD-10-CM

## 2024-01-17 DIAGNOSIS — Z78.0 MENOPAUSE: ICD-10-CM

## 2024-01-17 DIAGNOSIS — Z86.39 HISTORY OF DIABETES MELLITUS: Primary | ICD-10-CM

## 2024-01-17 DIAGNOSIS — I10 ESSENTIAL HYPERTENSION: ICD-10-CM

## 2024-01-17 PROCEDURE — 1126F AMNT PAIN NOTED NONE PRSNT: CPT | Performed by: FAMILY MEDICINE

## 2024-01-17 PROCEDURE — 1170F FXNL STATUS ASSESSED: CPT | Performed by: FAMILY MEDICINE

## 2024-01-17 PROCEDURE — 99214 OFFICE O/P EST MOD 30 MIN: CPT | Performed by: FAMILY MEDICINE

## 2024-01-17 PROCEDURE — 3075F SYST BP GE 130 - 139MM HG: CPT | Performed by: FAMILY MEDICINE

## 2024-01-17 PROCEDURE — 3079F DIAST BP 80-89 MM HG: CPT | Performed by: FAMILY MEDICINE

## 2024-01-17 PROCEDURE — 1160F RVW MEDS BY RX/DR IN RCRD: CPT | Performed by: FAMILY MEDICINE

## 2024-01-17 PROCEDURE — 3008F BODY MASS INDEX DOCD: CPT | Performed by: FAMILY MEDICINE

## 2024-01-17 PROCEDURE — 1159F MED LIST DOCD IN RCRD: CPT | Performed by: FAMILY MEDICINE

## 2024-01-17 RX ORDER — BLOOD SUGAR DIAGNOSTIC
STRIP MISCELLANEOUS
Qty: 100 STRIP | Refills: 2 | Status: SHIPPED | OUTPATIENT
Start: 2024-01-17

## 2024-01-17 RX ORDER — HUMAN INSULIN 100 [IU]/ML
12 INJECTION, SUSPENSION SUBCUTANEOUS 2 TIMES DAILY
Qty: 1 EACH | Refills: 0 | Status: CANCELLED | OUTPATIENT
Start: 2024-01-17

## 2024-01-17 RX ORDER — LISINOPRIL 20 MG/1
20 TABLET ORAL DAILY
Qty: 90 TABLET | Refills: 1 | Status: SHIPPED | OUTPATIENT
Start: 2024-01-17

## 2024-01-17 RX ORDER — AMLODIPINE BESYLATE 10 MG/1
10 TABLET ORAL EVERY MORNING
Qty: 90 TABLET | Refills: 3 | Status: SHIPPED | OUTPATIENT
Start: 2024-01-17

## 2024-01-17 RX ORDER — ATORVASTATIN CALCIUM 40 MG/1
40 TABLET, FILM COATED ORAL EVERY MORNING
Qty: 90 TABLET | Refills: 3 | Status: SHIPPED | OUTPATIENT
Start: 2024-01-17

## 2024-01-17 RX ORDER — HUMAN INSULIN 100 [IU]/ML
12 INJECTION, SUSPENSION SUBCUTANEOUS 2 TIMES DAILY
Qty: 1 EACH | Refills: 2 | Status: SHIPPED | OUTPATIENT
Start: 2024-01-17

## 2024-01-17 RX ORDER — BLOOD-GLUCOSE METER
KIT MISCELLANEOUS
Qty: 1 KIT | Refills: 0 | Status: SHIPPED | OUTPATIENT
Start: 2024-01-17

## 2024-01-17 RX ORDER — HUMAN INSULIN 100 [IU]/ML
12 INJECTION, SUSPENSION SUBCUTANEOUS 2 TIMES DAILY
Qty: 1 EACH | Refills: 2 | Status: SHIPPED
Start: 2024-01-17 | End: 2024-01-17

## 2024-01-17 NOTE — PROGRESS NOTES
Subjective:   Patient ID: Nely Saravia is a 68 year old female.    Patient is here for follow up for chronic medical issues- diabetes, hypertension and hyperlipidemia. The patient has not been taking medications consistently.  There are no acute issues and patient is requesting refills. The patient states medications have been helpful and effective when she takes them.  Pt also had a fall and has a fracture of nose and right arm. Has been seeing ENT and orthopedics for this. Will be starting therapy for her arm.  Pt requesting new glucometer and also needs referrals for endocrine.           History/Other:   Review of Systems  Current Outpatient Medications   Medication Sig Dispense Refill    lisinopril 20 MG Oral Tab Take 1 tablet (20 mg total) by mouth daily. 90 tablet 1    Glucose Blood (ONETOUCH ULTRA) In Vitro Strip To check blood sugars up to twice per day 100 strip 2    Blood Glucose Monitoring Suppl (ONETOUCH ULTRA MINI) w/Device Does not apply Kit To use as directed. 1 kit 0    atorvastatin 40 MG Oral Tab Take 1 tablet (40 mg total) by mouth every morning. 90 tablet 3    amLODIPine 10 MG Oral Tab Take 1 tablet (10 mg total) by mouth every morning. 90 tablet 3    Insulin NPH & Regular (NOVOLIN 70/30 FLEXPEN RELION) (70-30) 100 UNIT/ML Subcutaneous Suspension Pen-injector Inject 12 Units into the skin in the morning and 12 Units before bedtime. 1 each 2    traMADol 50 MG Oral Tab Take 1 tablet (50 mg total) by mouth every 6 (six) hours as needed for Pain. No alcohol or driving on this med. Stop if lethargic or hallucinating. 20 tablet 0    neomycin-polymyxin-hydrocortisone 3.5-41265-3 Otic Solution Place 3 drops into the right ear 3 (three) times daily. 10 mL 0    ondansetron (ZOFRAN) 4 mg tablet Take 1 tablet (4 mg total) by mouth every 8 (eight) hours as needed for Nausea. 20 tablet 0     Allergies:No Known Allergies    Objective:   Physical Exam  Constitutional:       Appearance: Normal appearance.    Cardiovascular:      Rate and Rhythm: Normal rate and regular rhythm.      Pulses: Normal pulses.      Heart sounds: Normal heart sounds.   Pulmonary:      Effort: Pulmonary effort is normal.      Breath sounds: Normal breath sounds.   Abdominal:      General: There is no distension.      Tenderness: There is no abdominal tenderness.   Neurological:      Mental Status: She is alert.   Psychiatric:         Mood and Affect: Mood normal.         Behavior: Behavior normal.         Thought Content: Thought content normal.         Judgment: Judgment normal.         Assessment & Plan:   1. History of diabetes mellitus:  - After discussion, will send to endocrine for further evaluation and treatment; To call if any significant symptoms. Refilled medication and new order for glucometer.      2. Essential hypertension:  - Stable, Will check lab work as below; Medication reviewed/ refilled. Follow up and further management after testing. To monitor blood pressure; To call if any persistent elevation of blood pressure; Discussed good diet/activity; Routine follow up in 6-12 months or as needed.       3. Hyperlipidemia, unspecified hyperlipidemia type:  - Stable; No side effects with medication; To call if problems. Will check lipid panel. Follow up and further management after labs. Continue good diet and activity. Routine follow up in 6-12 months.     4. Colon cancer screening:  - Also discussed colon screening with GI. Information provided.     5. Visit for screening mammogram:  - Mammogram order as requested; Follow up and further management after testing       6. Menopause:  - DEXA scan ordered as discussed; Follow up and further management after testing         Orders Placed This Encounter   Procedures    Comp Metabolic Panel (14)    Hemoglobin A1C    Lipid Panel    Microalb/Creat Ratio, Random Urine    CBC, Platelet; No Differential       Meds This Visit:  Requested Prescriptions     Signed Prescriptions Disp Refills     lisinopril 20 MG Oral Tab 90 tablet 1     Sig: Take 1 tablet (20 mg total) by mouth daily.    Glucose Blood (ONETOUCH ULTRA) In Vitro Strip 100 strip 2     Sig: To check blood sugars up to twice per day    Blood Glucose Monitoring Suppl (ONETOUCH ULTRA MINI) w/Device Does not apply Kit 1 kit 0     Sig: To use as directed.    atorvastatin 40 MG Oral Tab 90 tablet 3     Sig: Take 1 tablet (40 mg total) by mouth every morning.    amLODIPine 10 MG Oral Tab 90 tablet 3     Sig: Take 1 tablet (10 mg total) by mouth every morning.    Insulin NPH & Regular (NOVOLIN 70/30 FLEXPEN RELION) (70-30) 100 UNIT/ML Subcutaneous Suspension Pen-injector 1 each 2     Sig: Inject 12 Units into the skin in the morning and 12 Units before bedtime.       Imaging & Referrals:  ENDOCRINOLOGY - INTERNAL  GASTRO - INTERNAL  MALI DIVYA 2D+3D SCREENING BILAT (CPT=77067/99278)  XR DEXA BONE DENSITOMETRY (CPT=77080)

## 2024-01-22 ENCOUNTER — TELEPHONE (OUTPATIENT)
Dept: PHYSICAL THERAPY | Facility: HOSPITAL | Age: 69
End: 2024-01-22

## 2024-01-23 ENCOUNTER — TELEPHONE (OUTPATIENT)
Dept: PHYSICAL THERAPY | Facility: HOSPITAL | Age: 69
End: 2024-01-23

## 2024-01-23 ENCOUNTER — APPOINTMENT (OUTPATIENT)
Dept: PHYSICAL THERAPY | Age: 69
End: 2024-01-23
Payer: MEDICARE

## 2024-01-26 ENCOUNTER — OFFICE VISIT (OUTPATIENT)
Dept: PHYSICAL THERAPY | Age: 69
End: 2024-01-26
Payer: MEDICARE

## 2024-01-26 DIAGNOSIS — S42.254A CLOSED NONDISPLACED FRACTURE OF GREATER TUBEROSITY OF RIGHT HUMERUS, INITIAL ENCOUNTER: Primary | ICD-10-CM

## 2024-01-26 PROCEDURE — 97110 THERAPEUTIC EXERCISES: CPT

## 2024-01-26 PROCEDURE — 97161 PT EVAL LOW COMPLEX 20 MIN: CPT

## 2024-01-26 NOTE — PROGRESS NOTES
P.T. EVALUATION:   Referring Physician: Dr. Tamayo  Diagnosis: Closed nondisplaced fracture of greater tuberosity of right humerus, initial encounter (S42.254A)      Date of Onset: December 17, 2023 Date of Service: 1/26/2024     PATIENT SUMMARY   Nely Saravia is a 68 year old y/o female who presents to therapy today with complaints of closed nondisplaced fracture of greater tuberosity of right humerus  Pt describes pain level 7/10 currently and with movement  History of current condition: Patient reports she fell and injured her right shoulder and also broke her nose.   Patient reports she fractured her arm/shoulder and then was put into a sling to allow healing.  She states she was in the sling for approximately four weeks.   Current functional limitations include reaching, lifting, gripping, carrying, ADLs, pushing, pulling, caretaking for grandchildren  Nely describes prior level of function independent without functional limitations. Pt goals include pain relief and return to prior level of function  Past medical history was reviewed with Nely. Patient has a past medical history of Diabetes (HCC), High blood pressure, and Hyperlipidemia.  Other co-morbidities include hx of right rotator cuff repair surgery, DM  Social hx: Pt is right handed.  She enjoys needle point.  Pt is not working.  She cares for her grandchildren and has not been able to do this since her fall.       ASSESSMENT:   Patient presents to PT clinic due to right humerus fracture on 12/17/2024.  Patient demos decreased R shoulder ROM, decreased RUE strength, altered posture, and pain.  These impairments are functionally limiting pt's ability to reach, lift, , carry, push, pull, care for her grandchildren, and perform ADLs.  Nely would benefit from skilled Physical Therapy to address the above impairments to relieve pain and return to prior level of function.      Precautions:  DM   OBJECTIVE:   Observation: pt ambulates into clinic  independently    AROM:   Shoulder ROM (standing):  Shoulder flx: R 30 deg, L155 deg  Shoulder abd: R 30 deg, L 155 deg  Shoulder IR/HBB: R L5, L T6    Shoulder P/AROM (supine):  Shoulder flx: R 45 deg, L 160 deg  Shoulder abd: R 85 deg, L 160 deg  Shoulder ER: R 0 deg, L 60 deg  Shoulder IR: R 80 deg, L 85 deg     Strength/MMT:   Shoulder flx:  R not tested, L 5/5  Shoulder abd: R not tested, L 5/5  [place] ER: R 3-/5, L 4+/5  Shoulder IR: R 4-/5  L 5/5  Biceps:  R 4/5, L 5/5  Triceps: R 4-/5, L 4/5  Notable inability to full close right hand    Posture: slight guarded upper trunk posture     Gait: pt ambulates into clinic independently    Today’s Treatment and Response:  Patient education provided on PT eval findings, treatment plan, goals, HEP  Patient received there ex, HEP  Charges: PT Eval, TE 2      Total Timed Treatment: 45 min     Total Treatment Time: 45 min      PT Eval Low Complexity     PLAN OF CARE:    Goals:    1- Pt will be I with maintenance and progression of HEP  2- Pt will demo increase in R shoulder ROM to WFL ease ability for pt to perform reaching activities  3- Pt will reports abolishment of pain with ADLs  4- Pt will demo increase in RUE strength ot 4+/5 or better to ease ability for pt to lift    Frequency / Duration: Patient will be seen for 2x/week or a total of 12 visits over a 90 day period. Treatment will include: Modalities prn, manual therapy, therapeutic exercises, therapeutic activity, and instructions on a home program.     Education or treatment limitation: None  Rehab Potential:good    Patient was advised of these findings, precautions, and treatment options and has agreed to actively participate in planning and for this course of care.    Thank you for your referral. Please co-sign or sign and return this letter via fax as soon as possible to [phone]. If you have any questions, please contact me at Dept: [phone]    Sincerely,  Electronically signed by therapist: Genoveva  Anjali PT, DPT    Physician's certification required: Yes  I certify the need for these services furnished under this plan of treatment and while under my care.    X___________________________________________________ Date____________________    Certification From: 1/26/2024  To:4/25/2024

## 2024-01-31 ENCOUNTER — OFFICE VISIT (OUTPATIENT)
Dept: PHYSICAL THERAPY | Age: 69
End: 2024-01-31
Payer: MEDICARE

## 2024-01-31 PROCEDURE — 97110 THERAPEUTIC EXERCISES: CPT

## 2024-01-31 NOTE — PROGRESS NOTES
Diagnosis: Closed nondisplaced fracture of greater tuberosity of right humerus, initial encounter (S42.254A)                Next MD visit: none scheduled  Fall Risk: standard         Precautions: n/a          Medication Changes since last visit?: No    Subjective: Patient reports 7/10 shoulder pain today with certain movements.  She reports she did notice it is getting a bit better.     Objective:     Date: 1/31/2024  [facility]   HEP   -    Therapeutic Exercise   - pendulums in standing AP, ML, CW/CCW 20x ea  - supine R PROM shoulder streching by clinician into flexion, abduction, & ER x 15 minutes  - supine R wrist flx/ext 2 x 10  - supine R yellow foam squeeze 2 x 10  - supine finger web R hand/fingers 2 x 10  - supine yellow digiflex 10x  - supine R elbow/flx extension 1 x 10  - supine B shoulder flexion with wand 1 x 10  - supine B chest press with wand 2 x 10  - supine B shoulder ER AROM 1 x 10   Manual Therapy   - STM R upper trap x 5 minutes   Therapeutic Activity      Modalities              Assessment:  Session focused on shoulder, elbow, and wrist stretching.  Initiated  and finger strengthening.     Plan:  continue PT    Charges: Ex 3       Total Timed Treatment: 45 min  Total Treatment Time: 45 min

## 2024-02-02 ENCOUNTER — OFFICE VISIT (OUTPATIENT)
Dept: PHYSICAL THERAPY | Age: 69
End: 2024-02-02
Payer: MEDICARE

## 2024-02-02 PROCEDURE — 97110 THERAPEUTIC EXERCISES: CPT

## 2024-02-02 NOTE — PROGRESS NOTES
Diagnosis: Closed nondisplaced fracture of greater tuberosity of right humerus, initial encounter (S42.254A)                Next MD visit: none scheduled  Fall Risk: standard         Precautions: n/a          Medication Changes since last visit?: No    Subjective: Patient reports 6/10 shoulder pain today.  She reports her shoulder is sore, but doing a bit better.  She reports she is trying to find ways to stretch her hand.     Objective:    2/2/2024:  Shoulder PROM (supine):   Shoulder flx: 130 deg  Shoulder abd: 140 deg  Shoulder ER: 40 deg     Date: 2/2/2024  Visit #: 3/10 ([facility]) no limit Date: 1/31/2024  Visit #: 2/10 ([facility]) no limit   HEP    -    Therapeutic Exercise   45 minutes  - seated OH pulleys into flexion x 2 minutes  - pendulums in standing AP, ML, CW/CCW 30x ea  - supine R PROM shoulder streching by clinician into flexion, abduction, & ER x 15 minutes  - supine finger web R hand/fingers 2 x 10  - supine yellow digiflex 20x  (all fingers at once)  - supine R elbow/flx extension 2 x 10  - supine B shoulder flexion with wand 2 x 10  - supine B chest press with wand 2 x 10  - supine B shoulder ER AROM 1 x 10 40 minutes  - pendulums in standing AP, ML, CW/CCW 20x ea  - supine R PROM shoulder streching by clinician into flexion, abduction, & ER x 15 minutes  - supine R wrist flx/ext 2 x 10  - supine R yellow foam squeeze 2 x 10  - supine finger web R hand/fingers 2 x 10  - supine yellow digiflex 10x  - supine R elbow/flx extension 1 x 10  - supine B shoulder flexion with wand 1 x 10  - supine B chest press with wand 2 x 10  - supine B shoulder ER AROM 1 x 10   Manual Therapy    - STM R upper trap x 5 minutes   Therapeutic Activity       Modalities   Heat pad to right shoulder in sitting x 5 minutes             Assessment:  Patient demos improved R shoulder ROM this session.    Plan:  continue PT    Charges: Ex 3       Total Timed Treatment: 40 min  Total Treatment Time: 45 min

## 2024-02-06 ENCOUNTER — OFFICE VISIT (OUTPATIENT)
Dept: PHYSICAL THERAPY | Age: 69
End: 2024-02-06
Payer: MEDICARE

## 2024-02-06 PROCEDURE — 97110 THERAPEUTIC EXERCISES: CPT

## 2024-02-06 NOTE — PROGRESS NOTES
Diagnosis: Closed nondisplaced fracture of greater tuberosity of right humerus, initial encounter (S42.254A)                Next MD visit: none scheduled  Fall Risk: standard         Precautions: n/a          Medication Changes since last visit?: No    Subjective: Patient reports 5/10 shoulder pain today. She states she bought some equipment for her hands.     Objective:    2/2/2024:  Shoulder PROM (supine):   Shoulder flx: 130 deg  Shoulder abd: 140 deg  Shoulder ER: 40 deg    2/6/2024:  Shoulder ER: 15 deg (regression today)     Date: 2/6/2024  Visit #: 4/10 ([employer] MCR) no limit Date: 2/2/2024  Visit #: 3/10 ([employer] MCR) no limit Date: 1/31/2024  Visit #: 2/10 ([employer] MCR) no limit   HEP   - updated HEP - see pt instructions section  -    Therapeutic Exercise   45 minutes  - seated OH pulleys into flexion x 2 minutes  - seated OH pulleys into abduction x 1 minutes  - supine R PROM shoulder streching by clinician into flexion, abduction, & ER x 15 minutes  - supine R shoulder ER AAROM with cane 2 x 10  - supine B shoulder flexion with wand 2 x 10  - sidelying R shoulder ER AROM 10x  - supine B chest press with wand 2 x 10  - supine B chest press 1# 10x  - supine R shoulder flexion AROM 10x  - standing B shoulder flexion wall wash 10x 45 minutes  - seated OH pulleys into flexion x 2 minutes  - pendulums in standing AP, ML, CW/CCW 30x ea  - supine R PROM shoulder streching by clinician into flexion, abduction, & ER x 15 minutes  - supine finger web R hand/fingers 2 x 10  - supine yellow digiflex 20x  (all fingers at once)  - supine R elbow/flx extension 2 x 10  - supine B shoulder flexion with wand 2 x 10  - supine B chest press with wand 2 x 10  - supine B shoulder ER AROM 1 x 10 40 minutes  - pendulums in standing AP, ML, CW/CCW 20x ea  - supine R PROM shoulder streching by clinician into flexion, abduction, & ER x 15 minutes  - supine R wrist flx/ext 2 x 10  - supine R yellow foam squeeze 2 x 10  - supine finger web  R hand/fingers 2 x 10  - supine yellow digiflex 10x  - supine R elbow/flx extension 1 x 10  - supine B shoulder flexion with wand 1 x 10  - supine B chest press with wand 2 x 10  - supine B shoulder ER AROM 1 x 10   Manual Therapy     - STM R upper trap x 5 minutes   Therapeutic Activity        Modalities    Heat pad to right shoulder in sitting x 5 minutes              Assessment: Pt's shoulder ER ROM limited today with PROM stretching.  Updated HEP.     Plan:  continue PT    Charges: Ex 3       Total Timed Treatment: 42 min  Total Treatment Time: 45 min

## 2024-02-08 ENCOUNTER — HOSPITAL ENCOUNTER (OUTPATIENT)
Dept: GENERAL RADIOLOGY | Facility: HOSPITAL | Age: 69
Discharge: HOME | End: 2024-02-08
Payer: MEDICARE

## 2024-02-08 ENCOUNTER — OFFICE VISIT (OUTPATIENT)
Dept: ORTHOPEDICS CLINIC | Facility: CLINIC | Age: 69
End: 2024-02-08

## 2024-02-08 DIAGNOSIS — S42.254D CLOSED NONDISPLACED FRACTURE OF GREATER TUBEROSITY OF RIGHT HUMERUS WITH ROUTINE HEALING, SUBSEQUENT ENCOUNTER: ICD-10-CM

## 2024-02-08 DIAGNOSIS — S42.254D CLOSED NONDISPLACED FRACTURE OF GREATER TUBEROSITY OF RIGHT HUMERUS WITH ROUTINE HEALING, SUBSEQUENT ENCOUNTER: Primary | ICD-10-CM

## 2024-02-08 PROCEDURE — 99024 POSTOP FOLLOW-UP VISIT: CPT

## 2024-02-08 PROCEDURE — 73060 X-RAY EXAM OF HUMERUS: CPT

## 2024-02-08 NOTE — PROGRESS NOTES
NURSING INTAKE COMMENTS:   Chief Complaint   Patient presents with    Fracture     R Humerus fx f/u -  Pt states pain when trying to lift arm - no numbness or tingling        HPI: This 68 year old female presents today with complaints of right shoulder follow up. She is two months post injury with right humeral head fracture. States she is doing well today. She has been taking Tylenol as needed for pain and has been icing the shoulder. States she is still experiencing weakness in the right arm at this time.     Past Medical History:   Diagnosis Date    Diabetes (HCC)     High blood pressure     Hyperlipidemia      Past Surgical History:   Procedure Laterality Date    HYSTERECTOMY       Current Outpatient Medications   Medication Sig Dispense Refill    lisinopril 20 MG Oral Tab Take 1 tablet (20 mg total) by mouth daily. 90 tablet 1    Glucose Blood (ONETOUCH ULTRA) In Vitro Strip To check blood sugars up to twice per day 100 strip 2    Blood Glucose Monitoring Suppl (ONETOUCH ULTRA MINI) w/Device Does not apply Kit To use as directed. 1 kit 0    atorvastatin 40 MG Oral Tab Take 1 tablet (40 mg total) by mouth every morning. 90 tablet 3    amLODIPine 10 MG Oral Tab Take 1 tablet (10 mg total) by mouth every morning. 90 tablet 3    Insulin NPH & Regular (NOVOLIN 70/30 FLEXPEN RELION) (70-30) 100 UNIT/ML Subcutaneous Suspension Pen-injector Inject 12 Units into the skin in the morning and 12 Units before bedtime. 1 each 2    traMADol 50 MG Oral Tab Take 1 tablet (50 mg total) by mouth every 6 (six) hours as needed for Pain. No alcohol or driving on this med. Stop if lethargic or hallucinating. 20 tablet 0    neomycin-polymyxin-hydrocortisone 3.5-68750-5 Otic Solution Place 3 drops into the right ear 3 (three) times daily. 10 mL 0    ondansetron (ZOFRAN) 4 mg tablet Take 1 tablet (4 mg total) by mouth every 8 (eight) hours as needed for Nausea. 20 tablet 0     No Known Allergies  Family History   Problem Relation Age of  Onset    Diabetes Father     Hypertension Father     Hypertension Mother        Social History     Occupational History    Not on file   Tobacco Use    Smoking status: Never    Smokeless tobacco: Never   Vaping Use    Vaping Use: Never used   Substance and Sexual Activity    Alcohol use: Never    Drug use: Never    Sexual activity: Not on file        Review of Systems:  GENERAL: denies fevers, chills, night sweats, fatigue, unintentional weight loss/gain  SKIN: denies skin lesions, open sores, rash  HEENT:denies recent vision change, new nasal congestion,hearing loss, tinnitus, sore throat, headaches  RESPIRATORY: denies new shortness of breath, cough, asthma, wheezing  CARDIOVASCULAR: denies chest pain, leg cramps with exertion, palpitations, leg swelling  GI: denies abdominal pain, nausea, vomiting, diarrhea, constipation, hematochezia, worsening heartburn or stomach ulcers  : denies dysuria, hematuria, incontinence, increased frequency, urgency, difficulty urinating  MUSCULOSKELETAL: denies musculoskeletal complaints other than in HPI  NEURO: denies numbness, tingling, weakness, balance issues, dizziness, memory loss  PSYCHIATRIC: denies Hx of depression, anxiety, other psychiatric disorders  HEMATOLOGIC: denies blood clots, anemia, blood clotting disorders, blood transfusion  ENDOCRINE: denies autoimmune disease, thyroid issues, or diabetes  ALLERGY: denies asthma, seasonal allergies    Physical Examination:    There were no vitals taken for this visit.  Constitutional: appears well hydrated, alert and responsive, no acute distress noted  Extremities: Right shoulder is nontender to palpation. Active forward flexion is 130 degrees with mild pain. No erythema or palpable warmth.   Neurological: Radial nerve intact.  strength on right hand 3/5, left hand 5/5. No numbness.     Imaging:   XR HUMERUS (MIN 2 VIEWS), RIGHT (CPT=73060)    Result Date: 1/11/2024  PROCEDURE: XR HUMERUS (MIN 2 VIEWS), RIGHT  (CPT=73060)  COMPARISON: [facility], XR HUMERUS (MIN 2 VIEWS), RIGHT (CPT=73060), 12/17/2023, 3:32 PM.  INDICATIONS: Closed nondisplaced fracture of greater tuberosity of right humerus, initial encounter.  TECHNIQUE: 2 views were obtained.   FINDINGS:  BONES: There is redemonstration of a comminuted right humeral head fracture extending to the greater tuberosity.  A transverse component is again seen in the humeral neck with new 4 mm medial displacement.  There has been development of periosteal new bone formation at the fracture site.  There is no glenohumeral joint dislocation.  There is stable mild glenohumeral and acromioclavicular joint osteoarthritis.  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 SOFT TISSUES: Negative. No visible soft tissue swelling. EFFUSION: None visible. OTHER: Negative.         CONCLUSION:  Subacute comminuted right humeral head fracture demonstrates the development of periosteal new bone formation consistent with ongoing healing response.  There is new mild medial displacement at the level of the humeral neck.    [facility]  Dictated by (CST): Galen Slater MD on 1/11/2024 at 2:32 PM     Finalized by (CST): Galen Slater MD on 1/11/2024 at 2:35 PM             Labs:  Lab Results   Component Value Date    WBC 9.0 10/07/2021    HGB 10.8 (L) 10/07/2021    .0 10/07/2021      Lab Results   Component Value Date     (H) 10/07/2021    BUN 9 10/07/2021    CREATSERUM 0.97 10/07/2021    GFRNAA 61 10/07/2021    GFRAA 70 10/07/2021        Assessment and Plan:  Diagnoses and all orders for this visit:    Closed nondisplaced fracture of greater tuberosity of right humerus with routine healing, subsequent encounter  -     XR HUMERUS (MIN 2 VIEWS), RIGHT (CPT=73060); Future        Assessment: Healing right humeral tuberosity fracture     Plan: I recommend continued physical therapy and home exercises at this time. Continue to focus  on strengthening and stretching in the extremity. Continue icing and Tylenol as needed for pain relief. Refrain from heavy lifting, pushing, or pulling with the extremity at this time. Follow up in four weeks with new x-rays     Follow Up: Return in about 4 weeks (around 3/7/2024).    Cristina Tamayo PA-C

## 2024-02-12 ENCOUNTER — APPOINTMENT (OUTPATIENT)
Dept: PHYSICAL THERAPY | Age: 69
End: 2024-02-12
Payer: MEDICARE

## 2024-02-12 ENCOUNTER — TELEPHONE (OUTPATIENT)
Dept: PHYSICAL THERAPY | Facility: HOSPITAL | Age: 69
End: 2024-02-12

## 2024-02-14 ENCOUNTER — OFFICE VISIT (OUTPATIENT)
Dept: PHYSICAL THERAPY | Age: 69
End: 2024-02-14
Payer: MEDICARE

## 2024-02-14 PROCEDURE — 97110 THERAPEUTIC EXERCISES: CPT

## 2024-02-14 NOTE — PROGRESS NOTES
Diagnosis: Closed nondisplaced fracture of greater tuberosity of right humerus, initial encounter (S42.254A)                Next MD visit: none scheduled  Fall Risk: standard         Precautions: n/a          Medication Changes since last visit?: No    Subjective: Patient reports 7/10 right arm pain today.  She reports she is having some medial elbow region/distal humerus bruising.  She reports she saw her MD who states her shoulder is still healing.    Objective:    2/14/2024:  Shoulder PROM (supine):   Shoulder flx: 130 deg  Shoulder abd: 135 deg  Shoulder ER: 20 deg     Date: 2/14/2024  Visit #: 5/10 ([employer] MCR) no limit Date: 2/6/2024  Visit #: 4/10 ([employer] MCR) no limit Date: 2/2/2024  Visit #: 3/10 ([employer] MCR) no limit   HEP    - updated HEP - see pt instructions section    Therapeutic Exercise   35 minutes  - seated B shoulder table wash with towel 2 x 10  - supine gentle R PROM shoulder streching by clinician into flexion, abduction, & ER x 15 minutes  - supine B shoulder flexion with wand 2 x 10  - supine B chest press 1# 15x  - supine R shoulder flexion AROM 15x  - standing B shoulder flexion wall wash 2 x 10  - standing B shoulder extension with wand 2 x 10 45 minutes  - seated OH pulleys into flexion x 2 minutes  - seated OH pulleys into abduction x 1 minutes  - supine R PROM shoulder streching by clinician into flexion, abduction, & ER x 15 minutes  - supine R shoulder ER AAROM with cane 2 x 10  - supine B shoulder flexion with wand 2 x 10  - sidelying R shoulder ER AROM 10x  - supine B chest press with wand 2 x 10  - supine B chest press 1# 10x  - supine R shoulder flexion AROM 10x  - standing B shoulder flexion wall wash 10x 45 minutes  - seated OH pulleys into flexion x 2 minutes  - pendulums in standing AP, ML, CW/CCW 30x ea  - supine R PROM shoulder streching by clinician into flexion, abduction, & ER x 15 minutes  - supine finger web R hand/fingers 2 x 10  - supine yellow digiflex 20x  (all fingers  at once)  - supine R elbow/flx extension 2 x 10  - supine B shoulder flexion with wand 2 x 10  - supine B chest press with wand 2 x 10  - supine B shoulder ER AROM 1 x 10   Manual Therapy        Therapeutic Activity        Modalities   - heat pad to right UE in sitting x 5 minutes  Heat pad to right shoulder in sitting x 5 minutes             Assessment: Advanced reps of AAROM and AROM exercises within pain free-ranges.     Plan:  continue PT    Charges: Ex 2      Total Timed Treatment: 35 min  Total Treatment Time: 40 min

## 2024-02-20 ENCOUNTER — OFFICE VISIT (OUTPATIENT)
Dept: PHYSICAL THERAPY | Age: 69
End: 2024-02-20
Attending: FAMILY MEDICINE
Payer: MEDICARE

## 2024-02-20 PROCEDURE — 97110 THERAPEUTIC EXERCISES: CPT

## 2024-02-20 NOTE — PROGRESS NOTES
Diagnosis: Closed nondisplaced fracture of greater tuberosity of right humerus, initial encounter (S42.254A)                Next MD visit: none scheduled  Fall Risk: standard         Precautions: n/a          Medication Changes since last visit?: No    Subjective: Patient reports 4/10 right shoulder pain today.      Objective:    2/20/2024:  Shoulder PROM (supine):   Shoulder flx: 140 deg  Shoulder abd: 145 deg  Shoulder ER: 25 deg     Date: 2/20/2024  Visit #: 6/10 ([employer] MCR) no limit Date: 2/14/2024  Visit #: 5/10 ([employer] MCR) no limit Date: 2/6/2024  Visit #: 4/10 ([employer] MCR) no limit   HEP   - updated HEP - see pt instructions section  - updated HEP - see pt instructions section   Therapeutic Exercise   40 minutes  - supine gentle R PROM shoulder streching by clinician into flexion, abduction, & ER x 15 minutes  - supine B shoulder flexion with wand 3 x 10  - supine B chest press with wand 2 x 10  - supine B shoulder horizontal abd/add with 0# 2 x 10  - supine B chest press 1# 2 x 10  - standing B shoulder flexion wall wash 2 x 10  - standing B shoulder extension with wand 2 x 10  - standing B shoulder flexion with wand 10x  - standing R scap rows with RTB 2 x 10  - standing R shoulder extension with YTB 2 x 10 35 minutes  - seated B shoulder table wash with towel 2 x 10  - supine gentle R PROM shoulder streching by clinician into flexion, abduction, & ER x 15 minutes  - supine B shoulder flexion with wand 2 x 10  - supine B chest press 1# 15x  - supine R shoulder flexion AROM 15x  - standing B shoulder flexion wall wash 2 x 10  - standing B shoulder extension with wand 2 x 10 45 minutes  - seated OH pulleys into flexion x 2 minutes  - seated OH pulleys into abduction x 1 minutes  - supine R PROM shoulder streching by clinician into flexion, abduction, & ER x 15 minutes  - supine R shoulder ER AAROM with cane 2 x 10  - supine B shoulder flexion with wand 2 x 10  - sidelying R shoulder ER AROM 10x  - supine B  chest press with wand 2 x 10  - supine B chest press 1# 10x  - supine R shoulder flexion AROM 10x  - standing B shoulder flexion wall wash 10x   Manual Therapy        Therapeutic Activity        Modalities    - heat pad to right UE in sitting x 5 minutes              Assessment:  Patient demos improved R shoulder ROM with passive stretching today and noted during performance of therapeutic exercises.  Initiated theraband resisted UE and scapular interventions this session.     Plan:  continue PT    Charges: Ex 3      Total Timed Treatment: 45 min  Total Treatment Time: 45 min

## 2024-02-26 ENCOUNTER — TELEPHONE (OUTPATIENT)
Dept: PHYSICAL THERAPY | Facility: HOSPITAL | Age: 69
End: 2024-02-26

## 2024-02-27 ENCOUNTER — APPOINTMENT (OUTPATIENT)
Dept: PHYSICAL THERAPY | Age: 69
End: 2024-02-27
Attending: FAMILY MEDICINE
Payer: MEDICARE

## 2024-03-04 ENCOUNTER — TELEPHONE (OUTPATIENT)
Dept: PHYSICAL THERAPY | Age: 69
End: 2024-03-04

## 2024-03-05 ENCOUNTER — APPOINTMENT (OUTPATIENT)
Dept: PHYSICAL THERAPY | Age: 69
End: 2024-03-05
Attending: FAMILY MEDICINE
Payer: MEDICARE

## 2024-03-07 ENCOUNTER — OFFICE VISIT (OUTPATIENT)
Dept: ORTHOPEDICS CLINIC | Facility: CLINIC | Age: 69
End: 2024-03-07

## 2024-03-07 ENCOUNTER — HOSPITAL ENCOUNTER (OUTPATIENT)
Dept: GENERAL RADIOLOGY | Facility: HOSPITAL | Age: 69
Discharge: HOME | End: 2024-03-07
Payer: MEDICARE

## 2024-03-07 DIAGNOSIS — R52 PAIN: ICD-10-CM

## 2024-03-07 DIAGNOSIS — S42.254D CLOSED NONDISPLACED FRACTURE OF GREATER TUBEROSITY OF RIGHT HUMERUS WITH ROUTINE HEALING, SUBSEQUENT ENCOUNTER: ICD-10-CM

## 2024-03-07 DIAGNOSIS — T14.8XXA FRACTURE: Primary | ICD-10-CM

## 2024-03-07 DIAGNOSIS — Z47.89 ORTHOPEDIC AFTERCARE: ICD-10-CM

## 2024-03-07 DIAGNOSIS — T14.8XXA FRACTURE: ICD-10-CM

## 2024-03-07 PROCEDURE — 73060 X-RAY EXAM OF HUMERUS: CPT

## 2024-03-07 PROCEDURE — 73564 X-RAY EXAM KNEE 4 OR MORE: CPT

## 2024-03-07 PROCEDURE — 99213 OFFICE O/P EST LOW 20 MIN: CPT

## 2024-03-07 NOTE — PROGRESS NOTES
NURSING INTAKE COMMENTS:   Chief Complaint   Patient presents with    Fracture     R Humerus fx f/u - Pt states she is having pain - Rates pain 6/10 -  Both knees are giving her pain        HPI: This 68 year old female presents today with complaints of right humerus follow up. She is three months post injury with right humeral head fracture. States she is doing well today. She has occasional aching in the shoulder. States the pain improves with Tylenol. She has been progressing well with physical therapy as well. She is still experiencing some weakness in the right hand.    Patient complains of bilateral knee pain since her fall. She understands she will need a separate appointment to assess her knee pain.     Past Medical History:   Diagnosis Date    Diabetes (HCC)     High blood pressure     Hyperlipidemia      Past Surgical History:   Procedure Laterality Date    HYSTERECTOMY       Current Outpatient Medications   Medication Sig Dispense Refill    lisinopril 20 MG Oral Tab Take 1 tablet (20 mg total) by mouth daily. 90 tablet 1    Glucose Blood (ONETOUCH ULTRA) In Vitro Strip To check blood sugars up to twice per day 100 strip 2    Blood Glucose Monitoring Suppl (ONETOUCH ULTRA MINI) w/Device Does not apply Kit To use as directed. 1 kit 0    atorvastatin 40 MG Oral Tab Take 1 tablet (40 mg total) by mouth every morning. 90 tablet 3    amLODIPine 10 MG Oral Tab Take 1 tablet (10 mg total) by mouth every morning. 90 tablet 3    Insulin NPH & Regular (NOVOLIN 70/30 FLEXPEN RELION) (70-30) 100 UNIT/ML Subcutaneous Suspension Pen-injector Inject 12 Units into the skin in the morning and 12 Units before bedtime. 1 each 2    traMADol 50 MG Oral Tab Take 1 tablet (50 mg total) by mouth every 6 (six) hours as needed for Pain. No alcohol or driving on this med. Stop if lethargic or hallucinating. 20 tablet 0    neomycin-polymyxin-hydrocortisone 3.5-07313-1 Otic Solution Place 3 drops into the right ear 3 (three) times  daily. 10 mL 0    ondansetron (ZOFRAN) 4 mg tablet Take 1 tablet (4 mg total) by mouth every 8 (eight) hours as needed for Nausea. 20 tablet 0     No Known Allergies  Family History   Problem Relation Age of Onset    Diabetes Father     Hypertension Father     Hypertension Mother        Social History     Occupational History    Not on file   Tobacco Use    Smoking status: Never    Smokeless tobacco: Never   Vaping Use    Vaping Use: Never used   Substance and Sexual Activity    Alcohol use: Never    Drug use: Never    Sexual activity: Not on file        Review of Systems:  GENERAL: denies fevers, chills, night sweats, fatigue, unintentional weight loss/gain  SKIN: denies skin lesions, open sores, rash  HEENT:denies recent vision change, new nasal congestion,hearing loss, tinnitus, sore throat, headaches  RESPIRATORY: denies new shortness of breath, cough, asthma, wheezing  CARDIOVASCULAR: denies chest pain, leg cramps with exertion, palpitations, leg swelling  GI: denies abdominal pain, nausea, vomiting, diarrhea, constipation, hematochezia, worsening heartburn or stomach ulcers  : denies dysuria, hematuria, incontinence, increased frequency, urgency, difficulty urinating  MUSCULOSKELETAL: denies musculoskeletal complaints other than in HPI  NEURO: denies numbness, tingling, weakness, balance issues, dizziness, memory loss  PSYCHIATRIC: denies Hx of depression, anxiety, other psychiatric disorders  HEMATOLOGIC: denies blood clots, anemia, blood clotting disorders, blood transfusion  ENDOCRINE: denies autoimmune disease, thyroid issues, or diabetes  ALLERGY: denies asthma, seasonal allergies    Physical Examination:    There were no vitals taken for this visit.  Constitutional: appears well hydrated, alert and responsive, no acute distress noted  Extremities: Right shoulder is nontender to palpation. Active forward flexion 150 degrees, passive forward flexion 170 degrees. Active external rotation 50 degrees.  Passive internal rotation 60 degrees. No numbness.   Neurological: Radial nerve intact.  strength on right hand 3/5, left hand 5/5.    Imaging:   XR HUMERUS (MIN 2 VIEWS), RIGHT (CPT=73060)    Result Date: 3/7/2024         PROCEDURE: XR HUMERUS (MIN 2 VIEWS), RIGHT (CPT=73060)  COMPARISON: [facility] 2nd Floor, XR HUMERUS (MIN 2 VIEWS), RIGHT (CPT=73060), 2/08/2024, 10:51 AM.  INDICATIONS: Follow-up for right humerus fracture.  TECHNIQUE: Three views Findings and impression:  Mildly increased callus at the proximal humeral fracture.  Stable mild displacement.  Normal shoulder alignment.  No new fracture     Dictated by (CST): Nabor Kaba MD on 3/07/2024 at 9:40 AM     Finalized by (CST): Nabor Kaba MD on 3/07/2024 at 9:41 AM          XR KNEE COMPLETE BILAT EM(CPT=73564-50)    Result Date: 3/7/2024  PROCEDURE: XR KNEE COMPLETE BILAT EM  COMPARISON: None.  INDICATIONS: Deep bilateral knee pain post fall 4 months ago.  TECHNIQUE: Five views FINDINGS:  Right knee:  Severe narrowing lateral compartment.  Moderate size tricompartmental spurs.  Normal alignment with no fracture or effusion  Left knee:  Mild narrowing medial compartment.  Moderate size tricompartmental spurs.  Normal alignment with no fracture or effusion.         CONCLUSION: Severe OA right knee.  Mild OA left knee    Dictated by (CST): Nabor Kaba MD on 3/07/2024 at 9:39 AM     Finalized by (CST): Nabor Kaba MD on 3/07/2024 at 9:40 AM          XR HUMERUS (MIN 2 VIEWS), RIGHT (CPT=73060)    Result Date: 2/8/2024  PROCEDURE: XR HUMERUS (MIN 2 VIEWS), RIGHT (CPT=73060)  COMPARISON: [facility] 2nd Floor, XR HUMERUS (MIN 2 VIEWS), RIGHT (CPT=73060), 1/11/2024, 10:36 AM.  [facility], XR HUMERUS (MIN 2 VIEWS), RIGHT (CPT=73060), 12/17/2023, 3:32 PM.  INDICATIONS: Follow-up for proximal right humerus fracture.  TECHNIQUE: 3 views were obtained.   FINDINGS:  BONES: Again visualized is  a subacute transverse fracture of the proximal humerus.  There is progressive callus formation and bony bridging. The glenohumeral joint space is preserved. Mild degenerative change at the acromioclavicular joint. SOFT TISSUES: Negative. No visible soft tissue swelling. EFFUSION: None visible. OTHER: Negative.         CONCLUSION:   Healing proximal humeral fracture.     Dictated by (CST): Bunny Cavazos MD on 2/08/2024 at 3:10 PM     Finalized by (CST): Bunny Cavazos MD on 2/08/2024 at 3:11 PM             Labs:  Lab Results   Component Value Date    WBC 9.0 10/07/2021    HGB 10.8 (L) 10/07/2021    .0 10/07/2021      Lab Results   Component Value Date     (H) 10/07/2021    BUN 9 10/07/2021    CREATSERUM 0.97 10/07/2021    GFRNAA 61 10/07/2021    GFRAA 70 10/07/2021        Assessment and Plan:  Diagnoses and all orders for this visit:    Fracture  -     XR HUMERUS (MIN 2 VIEWS), RIGHT (CPT=73060); Future    Pain  -     XR KNEE COMPLETE BILAT EM(CPT=73564-50); Future    Orthopedic aftercare  -     Occupational Therapy Referral - [facility]    Closed nondisplaced fracture of greater tuberosity of right humerus with routine healing, subsequent encounter        Assessment: Healing right humeral tuberosity fracture     Plan: I recommend continued physical therapy and home exercises for shoulder pain. I advised occupational therapy for weakness in the right hand, referral provided today. Continue Tylenol as needed for pain. Advised icing as well. Follow up in 2 months for final x-rays. She will schedule a separate appointment for knee pain.     Follow Up: Return in about 8 weeks (around 5/2/2024).    Cristina Tamayo PA-C

## 2024-03-13 ENCOUNTER — OFFICE VISIT (OUTPATIENT)
Dept: PHYSICAL THERAPY | Age: 69
End: 2024-03-13
Attending: FAMILY MEDICINE
Payer: MEDICARE

## 2024-03-13 ENCOUNTER — LAB ENCOUNTER (OUTPATIENT)
Dept: LAB | Age: 69
End: 2024-03-13
Attending: FAMILY MEDICINE
Payer: MEDICARE

## 2024-03-13 DIAGNOSIS — E78.5 HYPERLIPIDEMIA, UNSPECIFIED HYPERLIPIDEMIA TYPE: ICD-10-CM

## 2024-03-13 DIAGNOSIS — Z86.39 HISTORY OF DIABETES MELLITUS: ICD-10-CM

## 2024-03-13 LAB
ALBUMIN SERPL-MCNC: 4.3 G/DL (ref 3.2–4.8)
ALBUMIN/GLOB SERPL: 1.7 {RATIO} (ref 1–2)
ALP LIVER SERPL-CCNC: 83 U/L
ALT SERPL-CCNC: 18 U/L
ANION GAP SERPL CALC-SCNC: 0 MMOL/L (ref 0–18)
AST SERPL-CCNC: 14 U/L (ref ?–34)
BILIRUB SERPL-MCNC: 0.6 MG/DL (ref 0.2–1.1)
BUN BLD-MCNC: 15 MG/DL (ref 9–23)
BUN/CREAT SERPL: 20.3 (ref 10–20)
CALCIUM BLD-MCNC: 9.7 MG/DL (ref 8.7–10.4)
CHLORIDE SERPL-SCNC: 107 MMOL/L (ref 98–112)
CHOLEST SERPL-MCNC: 214 MG/DL (ref ?–200)
CO2 SERPL-SCNC: 32 MMOL/L (ref 21–32)
CREAT BLD-MCNC: 0.74 MG/DL
CREAT UR-SCNC: 78.3 MG/DL
DEPRECATED RDW RBC AUTO: 39.1 FL (ref 35.1–46.3)
EGFRCR SERPLBLD CKD-EPI 2021: 88 ML/MIN/1.73M2 (ref 60–?)
ERYTHROCYTE [DISTWIDTH] IN BLOOD BY AUTOMATED COUNT: 12 % (ref 11–15)
EST. AVERAGE GLUCOSE BLD GHB EST-MCNC: 235 MG/DL (ref 68–126)
FASTING PATIENT LIPID ANSWER: YES
FASTING STATUS PATIENT QL REPORTED: YES
GLOBULIN PLAS-MCNC: 2.6 G/DL (ref 2.8–4.4)
GLUCOSE BLD-MCNC: 187 MG/DL (ref 70–99)
HBA1C MFR BLD: 9.8 % (ref ?–5.7)
HCT VFR BLD AUTO: 42.3 %
HDLC SERPL-MCNC: 65 MG/DL (ref 40–59)
HGB BLD-MCNC: 14.6 G/DL
LDLC SERPL CALC-MCNC: 137 MG/DL (ref ?–100)
MCH RBC QN AUTO: 30.9 PG (ref 26–34)
MCHC RBC AUTO-ENTMCNC: 34.5 G/DL (ref 31–37)
MCV RBC AUTO: 89.6 FL
MICROALBUMIN UR-MCNC: 8.5 MG/DL
MICROALBUMIN/CREAT 24H UR-RTO: 108.6 UG/MG (ref ?–30)
NONHDLC SERPL-MCNC: 149 MG/DL (ref ?–130)
OSMOLALITY SERPL CALC.SUM OF ELEC: 294 MOSM/KG (ref 275–295)
PLATELET # BLD AUTO: 279 10(3)UL (ref 150–450)
POTASSIUM SERPL-SCNC: 4.6 MMOL/L (ref 3.5–5.1)
PROT SERPL-MCNC: 6.9 G/DL (ref 5.7–8.2)
RBC # BLD AUTO: 4.72 X10(6)UL
SODIUM SERPL-SCNC: 139 MMOL/L (ref 136–145)
TRIGL SERPL-MCNC: 68 MG/DL (ref 30–149)
VLDLC SERPL CALC-MCNC: 12 MG/DL (ref 0–30)
WBC # BLD AUTO: 8.6 X10(3) UL (ref 4–11)

## 2024-03-13 PROCEDURE — 85027 COMPLETE CBC AUTOMATED: CPT

## 2024-03-13 PROCEDURE — 80053 COMPREHEN METABOLIC PANEL: CPT

## 2024-03-13 PROCEDURE — 97110 THERAPEUTIC EXERCISES: CPT

## 2024-03-13 PROCEDURE — 80061 LIPID PANEL: CPT

## 2024-03-13 PROCEDURE — 82570 ASSAY OF URINE CREATININE: CPT

## 2024-03-13 PROCEDURE — 83036 HEMOGLOBIN GLYCOSYLATED A1C: CPT

## 2024-03-13 PROCEDURE — 82043 UR ALBUMIN QUANTITATIVE: CPT

## 2024-03-13 PROCEDURE — 36415 COLL VENOUS BLD VENIPUNCTURE: CPT

## 2024-03-13 NOTE — PROGRESS NOTES
Diagnosis: Closed nondisplaced fracture of greater tuberosity of right humerus, initial encounter (S42.254A)                Next MD visit: none scheduled  Fall Risk: standard         Precautions: n/a          Medication Changes since last visit?: No    Subjective: Patient reports 3/10 right shoulder pain today.  She reports she has still difficulty with pushing, pulling, and lifting.    Objective:    3/13/2024:  Shoulder PROM (supine):   Shoulder flx: 140 deg  Shoulder abd: 145 deg  Shoulder ER: 25 deg      Date: 3/13/2024  Visit #: 7/10 ([employer] [facility]) no limit Date: 2/20/2024  Visit #: 6/10 ([employer] [facility]) no limit Date: 2/14/2024  Visit #: 5/10 ([facility]) no limit   HEP   - updated HEP - see pt instructions section - updated HEP - see pt instructions section    Therapeutic Exercise   40 minutes  - supine gentle R PROM shoulder streching by clinician into flexion, abduction, & ER x 15 minutes  - supine B shoulder flexion with wand 3 x 10  - supine B chest press 1# 2 x 10  - supine B shoulder flexion 1# 2 x 10  - sidelying R shoulder ER AROM 1 x 15  - sidelying R shoulder abduction 1 x 10  - standing B shoulder extension with wand 3 x 10  - standing B shoulder flexion with wand 2 x 10  - standing B scap rows with GSC 2 x 10  - standing B shoulder extension with RSC 2 x 10  - standing R shoulder ER/IR with YTB 2 x 10 ea  - standing R shoulder punch with RTB 2 x 10  - seated R bicep curls 1# 2 x 10 40 minutes  - supine gentle R PROM shoulder streching by clinician into flexion, abduction, & ER x 15 minutes  - supine B shoulder flexion with wand 3 x 10  - supine B chest press with wand 2 x 10  - supine B shoulder horizontal abd/add with 0# 2 x 10  - supine B chest press 1# 2 x 10  - standing B shoulder flexion wall wash 2 x 10  - standing B shoulder extension with wand 2 x 10  - standing B shoulder flexion with wand 10x  - standing R scap rows with RTB 2 x 10  - standing R shoulder extension with YTB 2 x 10 35 minutes  -  seated B shoulder table wash with towel 2 x 10  - supine gentle R PROM shoulder streching by clinician into flexion, abduction, & ER x 15 minutes  - supine B shoulder flexion with wand 2 x 10  - supine B chest press 1# 15x  - supine R shoulder flexion AROM 15x  - standing B shoulder flexion wall wash 2 x 10  - standing B shoulder extension with wand 2 x 10   Manual Therapy        Therapeutic Activity        Modalities     - heat pad to right UE in sitting x 5 minutes             Assessment: Progressed shoulder and scapular strengthening exercises.  Patient still with significant stiffness into shoulder ER.  Updated HEP.    Goals:  1- Pt will be I with maintenance and progression of HEP  2- Pt will demo increase in R shoulder ROM to WFL ease ability for pt to perform reaching activities  3- Pt will reports abolishment of pain with ADLs  4- Pt will demo increase in RUE strength ot 4+/5 or better to ease ability for pt to lift    Plan:  continue PT    Charges: Ex 3      Total Timed Treatment: 45 min  Total Treatment Time: 45 min

## 2024-03-14 ENCOUNTER — NURSE TRIAGE (OUTPATIENT)
Dept: FAMILY MEDICINE CLINIC | Facility: CLINIC | Age: 69
End: 2024-03-14

## 2024-03-14 NOTE — TELEPHONE ENCOUNTER
Action Requested: Summary for Provider     []  Critical Lab, Recommendations Needed  [] Need Additional Advice  []   FYI    []   Need Orders  [] Need Medications Sent to Pharmacy  []  Other     SUMMARY: PCP fully booked today, offered different provider but patient is NOT available today. Patient prefers Saturday appointment with Dr Méndez. Advised urgent care or immediate care  for worsening symptoms.    Future Appointments   Date Time Provider Department Center   3/16/2024  8:00 AM Armando Méndez MD [facility]                              Reason for call: Toe Injury  Onset: over a week       Right big toe under nail bed bruise/black, for more than a week now.   She doesn't remember if she dropped something on her toe, with little pain and swelling  at times. NO fever ,not on a blood thinner but she has DM and taking insulin.  No prior Tetanus shot .   The last time she has seen the endocrinologist was more than 2 years ago.       Reason for Disposition   MODERATE-SEVERE pain and blood present under the nail (usually > 50% of nail bed)    Protocols used: Toe Injury-A-OH

## 2024-03-16 ENCOUNTER — HOSPITAL ENCOUNTER (OUTPATIENT)
Dept: GENERAL RADIOLOGY | Age: 69
Discharge: HOME | End: 2024-03-16
Attending: FAMILY MEDICINE
Payer: MEDICARE

## 2024-03-16 ENCOUNTER — OFFICE VISIT (OUTPATIENT)
Dept: FAMILY MEDICINE CLINIC | Facility: CLINIC | Age: 69
End: 2024-03-16
Payer: MEDICARE

## 2024-03-16 VITALS
SYSTOLIC BLOOD PRESSURE: 136 MMHG | WEIGHT: 167 LBS | HEIGHT: 65 IN | HEART RATE: 80 BPM | BODY MASS INDEX: 27.82 KG/M2 | TEMPERATURE: 98 F | RESPIRATION RATE: 16 BRPM | DIASTOLIC BLOOD PRESSURE: 79 MMHG

## 2024-03-16 DIAGNOSIS — M79.674 PAIN OF RIGHT GREAT TOE: Primary | ICD-10-CM

## 2024-03-16 DIAGNOSIS — M79.674 PAIN OF RIGHT GREAT TOE: ICD-10-CM

## 2024-03-16 PROCEDURE — 73660 X-RAY EXAM OF TOE(S): CPT | Performed by: FAMILY MEDICINE

## 2024-03-16 PROCEDURE — 99213 OFFICE O/P EST LOW 20 MIN: CPT | Performed by: FAMILY MEDICINE

## 2024-03-16 NOTE — PROGRESS NOTES
Subjective:   Patient ID: Nely Saravia is a 68 year old female.    Pt presents with a bruise of the right big toe over the last 2 weeks. Pt states painful at times. No fevers or acute illness. Does not recall any sig injury. Has bruise under the nail.   Pt thinks she might have stubbed toe but no other sig injuries.         History/Other:   Review of Systems   Constitutional:  Negative for fever.   Neurological:         Bruise of the right big toe.     Current Outpatient Medications   Medication Sig Dispense Refill    lisinopril 20 MG Oral Tab Take 1 tablet (20 mg total) by mouth daily. 90 tablet 1    Glucose Blood (ONETOUCH ULTRA) In Vitro Strip To check blood sugars up to twice per day 100 strip 2    Blood Glucose Monitoring Suppl (ONETOUCH ULTRA MINI) w/Device Does not apply Kit To use as directed. 1 kit 0    atorvastatin 40 MG Oral Tab Take 1 tablet (40 mg total) by mouth every morning. 90 tablet 3    amLODIPine 10 MG Oral Tab Take 1 tablet (10 mg total) by mouth every morning. 90 tablet 3    Insulin NPH & Regular (NOVOLIN 70/30 FLEXPEN RELION) (70-30) 100 UNIT/ML Subcutaneous Suspension Pen-injector Inject 12 Units into the skin in the morning and 12 Units before bedtime. 1 each 2    traMADol 50 MG Oral Tab Take 1 tablet (50 mg total) by mouth every 6 (six) hours as needed for Pain. No alcohol or driving on this med. Stop if lethargic or hallucinating. 20 tablet 0    neomycin-polymyxin-hydrocortisone 3.5-79450-9 Otic Solution Place 3 drops into the right ear 3 (three) times daily. 10 mL 0    ondansetron (ZOFRAN) 4 mg tablet Take 1 tablet (4 mg total) by mouth every 8 (eight) hours as needed for Nausea. 20 tablet 0     Allergies:No Known Allergies    Objective:   Physical Exam  Constitutional:       Appearance: Normal appearance.   Musculoskeletal:      Comments: Right big toe. Bruising noted under nail area. Mild tenderness to palpation. Good sensation noted. Neurovascular intact/ normal.   No redness or  sig swelling. ROM intact of toes.  Bilateral barefoot skin diabetic exam is normal, visualized feet and the appearance is normal other then above.    Pulsation pedal pulse exam of both lower legs/feet is normal as well.         Neurological:      Mental Status: She is alert.         Assessment & Plan:   1. Pain of right great toe: bruising of toenail noted:   - After discussion with patient, will check xray of right big toe. Medications as needed for symptoms. Follow up and further management after testing. Discussed icing and rest. Call if any sig symptoms.         No orders of the defined types were placed in this encounter.      Meds This Visit:  Requested Prescriptions      No prescriptions requested or ordered in this encounter       Imaging & Referrals:  XR TOE(S) (MIN 2 VIEWS), RIGHT 1ST (CPT=73660)

## 2024-03-20 ENCOUNTER — APPOINTMENT (OUTPATIENT)
Dept: PHYSICAL THERAPY | Age: 69
End: 2024-03-20
Attending: FAMILY MEDICINE
Payer: MEDICARE

## 2024-03-20 ENCOUNTER — TELEPHONE (OUTPATIENT)
Dept: PHYSICAL THERAPY | Facility: HOSPITAL | Age: 69
End: 2024-03-20

## 2024-04-04 ENCOUNTER — OFFICE VISIT (OUTPATIENT)
Dept: ORTHOPEDICS CLINIC | Facility: CLINIC | Age: 69
End: 2024-04-04

## 2024-04-04 VITALS — DIASTOLIC BLOOD PRESSURE: 74 MMHG | SYSTOLIC BLOOD PRESSURE: 152 MMHG | HEART RATE: 80 BPM

## 2024-04-04 DIAGNOSIS — M17.0 PRIMARY OSTEOARTHRITIS OF BOTH KNEES: Primary | ICD-10-CM

## 2024-04-04 PROCEDURE — 20610 DRAIN/INJ JOINT/BURSA W/O US: CPT | Performed by: ORTHOPAEDIC SURGERY

## 2024-04-04 PROCEDURE — 99214 OFFICE O/P EST MOD 30 MIN: CPT | Performed by: ORTHOPAEDIC SURGERY

## 2024-04-04 RX ORDER — MELOXICAM 7.5 MG/1
7.5 TABLET ORAL DAILY
Qty: 30 TABLET | Refills: 1 | Status: SHIPPED | OUTPATIENT
Start: 2024-04-04

## 2024-04-04 RX ORDER — TRIAMCINOLONE ACETONIDE 40 MG/ML
40 INJECTION, SUSPENSION INTRA-ARTICULAR; INTRAMUSCULAR ONCE
Status: COMPLETED | OUTPATIENT
Start: 2024-04-04 | End: 2024-04-04

## 2024-04-04 RX ADMIN — TRIAMCINOLONE ACETONIDE 40 MG: 40 INJECTION, SUSPENSION INTRA-ARTICULAR; INTRAMUSCULAR at 13:20:00

## 2024-04-04 NOTE — PROGRESS NOTES
NURSING INTAKE COMMENTS:   Chief Complaint   Patient presents with    Knee Pain     Consult bilateral knee pain 7/10 Left knee is worse than Right. Onset several  yrs ago. Had fallen  on 2009 and 12/2023. Has XR in Epic.       HPI: This 68 year old female presents today with complaints of bilateral knee pain right worse than left.  She had progressive symptoms since December when she fell and fractured her shoulder.  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  She takes Tylenol intermittently.  Did have an arthroscopic surgery on the left knee years ago.  She reports clicking and popping and discomfort on the stairs.  Majority of the right knee pain is over the anterior knee but also laterally.  She has no significant back pain or hip pain.    Past Medical History:   Diagnosis Date    Diabetes (HCC)     High blood pressure     Hyperlipidemia      Past Surgical History:   Procedure Laterality Date    HYSTERECTOMY       Current Outpatient Medications   Medication Sig Dispense Refill    lisinopril 20 MG Oral Tab Take 1 tablet (20 mg total) by mouth daily. 90 tablet 1    Glucose Blood (ONETOUCH ULTRA) In Vitro Strip To check blood sugars up to twice per day 100 strip 2    Blood Glucose Monitoring Suppl (ONETOUCH ULTRA MINI) w/Device Does not apply Kit To use as directed. 1 kit 0    atorvastatin 40 MG Oral Tab Take 1 tablet (40 mg total) by mouth every morning. 90 tablet 3    amLODIPine 10 MG Oral Tab Take 1 tablet (10 mg total) by mouth every morning. 90 tablet 3    Insulin NPH & Regular (NOVOLIN 70/30 FLEXPEN RELION) (70-30) 100 UNIT/ML Subcutaneous Suspension Pen-injector Inject 12 Units into the skin in the morning and 12 Units before bedtime. 1 each 2    traMADol 50 MG Oral Tab Take 1 tablet (50 mg total) by mouth every 6 (six) hours as needed for Pain. No alcohol or driving on this med. Stop if lethargic or hallucinating. 20 tablet 0    neomycin-polymyxin-hydrocortisone 3.5-33781-9 Otic Solution Place 3 drops into  the right ear 3 (three) times daily. 10 mL 0    ondansetron (ZOFRAN) 4 mg tablet Take 1 tablet (4 mg total) by mouth every 8 (eight) hours as needed for Nausea. 20 tablet 0     No Known Allergies  Family History   Problem Relation Age of Onset    Diabetes Father     Hypertension Father     Hypertension Mother        Social History     Occupational History    Not on file   Tobacco Use    Smoking status: Never    Smokeless tobacco: Never   Vaping Use    Vaping Use: Never used   Substance and Sexual Activity    Alcohol use: Never    Drug use: Never    Sexual activity: Not on file        Review of Systems:  GENERAL: denies fevers, chills, night sweats, fatigue, unintentional weight loss/gain  SKIN: denies skin lesions, open sores, rash  HEENT:denies recent vision change, new nasal congestion,hearing loss, tinnitus, sore throat, headaches  RESPIRATORY: denies new shortness of breath, cough, asthma, wheezing  CARDIOVASCULAR: denies chest pain, leg cramps with exertion, palpitations, leg swelling  GI: denies abdominal pain, nausea, vomiting, diarrhea, constipation, hematochezia, worsening heartburn or stomach ulcers  : denies dysuria, hematuria, incontinence, increased frequency, urgency, difficulty urinating  MUSCULOSKELETAL: denies musculoskeletal complaints other than in HPI  NEURO: denies numbness, tingling, weakness, balance issues, dizziness, memory loss  PSYCHIATRIC: denies Hx of depression, anxiety, other psychiatric disorders  HEMATOLOGIC: denies blood clots, anemia, blood clotting disorders, blood transfusion  ENDOCRINE: denies autoimmune disease, thyroid issues, or diabetes  ALLERGY: denies asthma, seasonal allergies    Physical Examination:    /74 (BP Location: Right arm, Patient Position: Sitting, Cuff Size: adult)   Pulse 80   Constitutional: appears well hydrated, alert and responsive, no acute distress noted  Extremities: She walks without a limp.  She has a valgus deformity of the right knee.   Normal alignment to the left knee.  Further exam the right knee reveals trace effusion and tenderness over the lateral joint line.  There is moderate pain with correction of her valgus deformity.  Ligament stability normal bilaterally.  Neurological: Light touch and pinprick sensation intact throughout the lower extremities.  Ankle dorsiflexion plantarflexion EHL knee extension and hip flexion strength are 5 out of 5 bilaterally.  No clonus.    Imaging:   XR TOE(S) (MIN 2 VIEWS), RIGHT 1ST (CPT=73660)    Result Date: 3/16/2024  PROCEDURE: XR TOE(S) (MIN 2 VIEWS), RIGHT 1ST (CPT=73660)  COMPARISON: None.  INDICATIONS: Right 1st digit pain at distal phalanx x 2 weeks. No known injuries.  TECHNIQUE: 3 views were obtained.   FINDINGS:  BONES: There is diffuse osseous demineralization, which limits sensitivity for detection of osseous pathology.  There is joint space narrowing with osteophyte formation of the first interphalangeal joint.  There is an overhanging osteophyte along the dorsum of the base of the first proximal phalanx.  There is moderate MCP joint narrowing with osteophyte formation.  No acute fracture.  Suggestion of mild hallux valgus on limited views. SOFT TISSUES: Negative. No visible soft tissue swelling. EFFUSION: None visible. OTHER: Negative.         CONCLUSION:   Marginal osteophyte of the dorsum of the base of the first proximal phalanx, which can be seen in the setting of gout.   Moderate first metatarsophalangeal and interphalangeal joint osteoarthritis.   Dictated by (CST): Soo Byrd MD on 3/16/2024 at 11:01 AM     Finalized by (CST): Soo Byrd MD on 3/16/2024 at 11:04 AM          XR HUMERUS (MIN 2 VIEWS), RIGHT (CPT=73060)    Result Date: 3/7/2024         PROCEDURE: XR HUMERUS (MIN 2 VIEWS), RIGHT (CPT=73060)  COMPARISON: [facility] 2nd [facility], XR HUMERUS (MIN 2 VIEWS), RIGHT (CPT=73060), 2/08/2024, 10:51 AM.  INDICATIONS: Follow-up for right humerus  fracture.  TECHNIQUE: Three views Findings and impression:  Mildly increased callus at the proximal humeral fracture.  Stable mild displacement.  Normal shoulder alignment.  No new fracture     Dictated by (CST): Nabor Kaba MD on 3/07/2024 at 9:40 AM     Finalized by (CST): Nabor Kaba MD on 3/07/2024 at 9:41 AM          XR KNEE COMPLETE BILAT EM(CPT=73564-50)    Result Date: 3/7/2024  PROCEDURE: XR KNEE COMPLETE BILAT EM  COMPARISON: None.  INDICATIONS: Deep bilateral knee pain post fall 4 months ago.  TECHNIQUE: Five views FINDINGS:  Right knee:  Severe narrowing lateral compartment.  Moderate size tricompartmental spurs.  Normal alignment with no fracture or effusion  Left knee:  Mild narrowing medial compartment.  Moderate size tricompartmental spurs.  Normal alignment with no fracture or effusion.         CONCLUSION: Severe OA right knee.  Mild OA left knee    Dictated by (CST): Nabor Kaba MD on 3/07/2024 at 9:39 AM     Finalized by (CST): Nabor Kaba MD on 3/07/2024 at 9:40 AM             Labs:  Lab Results   Component Value Date    WBC 8.6 03/13/2024    HGB 14.6 03/13/2024    .0 03/13/2024      Lab Results   Component Value Date     (H) 03/13/2024    BUN 15 03/13/2024    CREATSERUM 0.74 03/13/2024    GFRNAA 61 10/07/2021    GFRAA 70 10/07/2021        Assessment and Plan:  Diagnoses and all orders for this visit:    Primary osteoarthritis of both knees  -     arthrocentesis major joint  -     triamcinolone acetonide (Kenalog-40) 40 MG/ML injection 40 mg  -     PHYSICAL THERAPY - INTERNAL        Assessment: Right knee osteoarthritis, primary.  Valgus deformity.  Left knee mild osteoarthritis, medial compartment    Plan: I discussed operative and nonoperative treatment options.  Advised nonoperative care for now.  Provided referral for outpatient physical therapy.  The right knee was injected with 40 mg of Kenalog using a superolateral parapatellar approach.  I prescribed meloxicam to be  taken intermittently for pain.  Follow-up with me again as needed.    Follow Up: Return if symptoms worsen or fail to improve.    SHELBY FIGUEREDO MD

## 2024-04-04 NOTE — PROGRESS NOTES
Per verbal order from Dr. Mtz draw up 3ml of 0.5% Marcaine & 2ml 1% lidocaine and 1ml of Kenalog 40 for cortisone injection to right knee Aiyana QURESHI MA    Patient provided education handout for cortisone injection.

## 2024-04-05 ENCOUNTER — OFFICE VISIT (OUTPATIENT)
Dept: PHYSICAL THERAPY | Age: 69
End: 2024-04-05
Attending: FAMILY MEDICINE
Payer: MEDICARE

## 2024-04-05 PROCEDURE — 97110 THERAPEUTIC EXERCISES: CPT

## 2024-04-05 NOTE — PROGRESS NOTES
Diagnosis: Closed nondisplaced fracture of greater tuberosity of right humerus, initial encounter (S42.254A)                Next MD visit: none scheduled  Fall Risk: standard         Precautions: n/a          Medication Changes since last visit?: No    Subjective: Patient reports 3/10 right shoulder pain today and 5/10 elbow pain.  She reports her shoulder is doing better.  Pt stating she needs to leave session early to drop her granddaughter off at school.      Objective:    4/4/2024:  Shoulder PROM (supine):   Shoulder flx: 150 deg  Shoulder abd: 160 deg  Shoulder ER: 35 deg      Date: 4/5/2024  Visit #: 8/10 ([facility]) no limit Date: 3/13/2024  Visit #: 7/10 ([employer] [facility]) no limit Date: 2/20/2024  Visit #: 6/10 ([employer]) no limit   HEP    - updated HEP - see pt instructions section - updated HEP - see pt instructions section   Therapeutic Exercise   30 minutes   - seated OH pulleys into flexion 3 minutes  - supine R shoulder PROM into flx, abd, ER x 8 minutes  - supine B shoulder flexion 2# 2 x 10  - supine B shoulder flexion with wand 10x  - supine B chest press 2# 2 x 10  - supine B shoulder horizontal abd/add 1# 2 x 10  - prone R mid trap 1 x 10  - prone R shoulder extension 1 x 10  - sidelying R shoulder ER AROM 2 x 10   40 minutes  - supine gentle R PROM shoulder streching by clinician into flexion, abduction, & ER x 15 minutes  - supine B shoulder flexion with wand 3 x 10  - supine B chest press 1# 2 x 10  - supine B shoulder flexion 1# 2 x 10  - sidelying R shoulder ER AROM 1 x 15  - sidelying R shoulder abduction 1 x 10  - standing B shoulder extension with wand 3 x 10  - standing B shoulder flexion with wand 2 x 10  - standing B scap rows with GSC 2 x 10  - standing B shoulder extension with RSC 2 x 10  - standing R shoulder ER/IR with YTB 2 x 10 ea  - standing R shoulder punch with RTB 2 x 10  - seated R bicep curls 1# 2 x 10 40 minutes  - supine gentle R PROM shoulder streching by clinician into  flexion, abduction, & ER x 15 minutes  - supine B shoulder flexion with wand 3 x 10  - supine B chest press with wand 2 x 10  - supine B shoulder horizontal abd/add with 0# 2 x 10  - supine B chest press 1# 2 x 10  - standing B shoulder flexion wall wash 2 x 10  - standing B shoulder extension with wand 2 x 10  - standing B shoulder flexion with wand 10x  - standing R scap rows with RTB 2 x 10  - standing R shoulder extension with YTB 2 x 10   Manual Therapy        Therapeutic Activity        Modalities                  Assessment: Patient demos improved R shoulder ROM with reassessment.  Advanced UE strengthening interventions this session.     Goals:  1- Pt will be I with maintenance and progression of HEP  2- Pt will demo increase in R shoulder ROM to WFL ease ability for pt to perform reaching activities  3- Pt will reports abolishment of pain with ADLs  4- Pt will demo increase in RUE strength ot 4+/5 or better to ease ability for pt to lift    Plan:  continue PT    Charges: Ex 2      Total Timed Treatment: 30 min  Total Treatment Time: 30 min

## 2024-04-19 ENCOUNTER — TELEPHONE (OUTPATIENT)
Dept: PHYSICAL THERAPY | Facility: HOSPITAL | Age: 69
End: 2024-04-19

## 2024-04-30 ENCOUNTER — APPOINTMENT (OUTPATIENT)
Dept: PHYSICAL THERAPY | Age: 69
End: 2024-04-30
Attending: FAMILY MEDICINE
Payer: MEDICARE

## 2024-05-02 ENCOUNTER — OFFICE VISIT (OUTPATIENT)
Dept: ORTHOPEDICS CLINIC | Facility: CLINIC | Age: 69
End: 2024-05-02
Payer: MEDICARE

## 2024-05-02 ENCOUNTER — HOSPITAL ENCOUNTER (OUTPATIENT)
Dept: GENERAL RADIOLOGY | Facility: HOSPITAL | Age: 69
Discharge: HOME | End: 2024-05-02
Payer: MEDICARE

## 2024-05-02 DIAGNOSIS — S42.254D CLOSED NONDISPLACED FRACTURE OF GREATER TUBEROSITY OF RIGHT HUMERUS WITH ROUTINE HEALING, SUBSEQUENT ENCOUNTER: Primary | ICD-10-CM

## 2024-05-02 DIAGNOSIS — S42.254D CLOSED NONDISPLACED FRACTURE OF GREATER TUBEROSITY OF RIGHT HUMERUS WITH ROUTINE HEALING, SUBSEQUENT ENCOUNTER: ICD-10-CM

## 2024-05-02 PROCEDURE — 99213 OFFICE O/P EST LOW 20 MIN: CPT

## 2024-05-02 PROCEDURE — 73060 X-RAY EXAM OF HUMERUS: CPT

## 2024-05-02 NOTE — PROGRESS NOTES
NURSING INTAKE COMMENTS:   Chief Complaint   Patient presents with    Fracture     R humerus f/u- rates pain 5/10 depends w/ certain movements       HPI: This 68 year old female presents today with complaints of right humerus follow up. She is now five months post injury from right humeral head fracture. States she is doing well today. She has continued home therapy exercises at this time. States she is unable to get into the physical therapist due to scheduling issues. She has continued to return to activities as tolerated at this time.     Past Medical History:    Diabetes (HCC)    High blood pressure    Hyperlipidemia     Past Surgical History:   Procedure Laterality Date    Hysterectomy       Current Outpatient Medications   Medication Sig Dispense Refill    Meloxicam 7.5 MG Oral Tab Take 1 tablet (7.5 mg total) by mouth daily. 30 tablet 1    lisinopril 20 MG Oral Tab Take 1 tablet (20 mg total) by mouth daily. 90 tablet 1    Glucose Blood (ONETOUCH ULTRA) In Vitro Strip To check blood sugars up to twice per day 100 strip 2    Blood Glucose Monitoring Suppl (ONETOUCH ULTRA MINI) w/Device Does not apply Kit To use as directed. 1 kit 0    atorvastatin 40 MG Oral Tab Take 1 tablet (40 mg total) by mouth every morning. 90 tablet 3    amLODIPine 10 MG Oral Tab Take 1 tablet (10 mg total) by mouth every morning. 90 tablet 3    Insulin NPH & Regular (NOVOLIN 70/30 FLEXPEN RELION) (70-30) 100 UNIT/ML Subcutaneous Suspension Pen-injector Inject 12 Units into the skin in the morning and 12 Units before bedtime. 1 each 2    traMADol 50 MG Oral Tab Take 1 tablet (50 mg total) by mouth every 6 (six) hours as needed for Pain. No alcohol or driving on this med. Stop if lethargic or hallucinating. 20 tablet 0    neomycin-polymyxin-hydrocortisone 3.5-04127-5 Otic Solution Place 3 drops into the right ear 3 (three) times daily. 10 mL 0    ondansetron (ZOFRAN) 4 mg tablet Take 1 tablet (4 mg total) by mouth every 8 (eight) hours  as needed for Nausea. 20 tablet 0     No Known Allergies  Family History   Problem Relation Age of Onset    Diabetes Father     Hypertension Father     Hypertension Mother        Social History     Occupational History    Not on file   Tobacco Use    Smoking status: Never    Smokeless tobacco: Never   Vaping Use    Vaping status: Never Used   Substance and Sexual Activity    Alcohol use: Never    Drug use: Never    Sexual activity: Not on file        Review of Systems:  GENERAL: denies fevers, chills, night sweats, fatigue, unintentional weight loss/gain  SKIN: denies skin lesions, open sores, rash  HEENT:denies recent vision change, new nasal congestion,hearing loss, tinnitus, sore throat, headaches  RESPIRATORY: denies new shortness of breath, cough, asthma, wheezing  CARDIOVASCULAR: denies chest pain, leg cramps with exertion, palpitations, leg swelling  GI: denies abdominal pain, nausea, vomiting, diarrhea, constipation, hematochezia, worsening heartburn or stomach ulcers  : denies dysuria, hematuria, incontinence, increased frequency, urgency, difficulty urinating  MUSCULOSKELETAL: denies musculoskeletal complaints other than in HPI  NEURO: denies numbness, tingling, weakness, balance issues, dizziness, memory loss  PSYCHIATRIC: denies Hx of depression, anxiety, other psychiatric disorders  HEMATOLOGIC: denies blood clots, anemia, blood clotting disorders, blood transfusion  ENDOCRINE: denies autoimmune disease, thyroid issues, or diabetes  ALLERGY: denies asthma, seasonal allergies    Physical Examination:    There were no vitals taken for this visit.  Constitutional: appears well hydrated, alert and responsive, no acute distress noted  Extremities: Right shoulder is nontender to palpation. She has some tenderness near her elbow today. No erythema or palpable warmth. No swelling in the area. Active forward flexion is 160 degrees. No numbness.   Neurological: Unchanged     Imaging:   No results found.      Labs:  Lab Results   Component Value Date    WBC 8.6 03/13/2024    HGB 14.6 03/13/2024    .0 03/13/2024      Lab Results   Component Value Date     (H) 03/13/2024    BUN 15 03/13/2024    CREATSERUM 0.74 03/13/2024    GFRNAA 61 10/07/2021    GFRAA 70 10/07/2021        Assessment and Plan:  Diagnoses and all orders for this visit:    Closed nondisplaced fracture of greater tuberosity of right humerus with routine healing, subsequent encounter  -     XR HUMERUS (MIN 2 VIEWS), RIGHT (CPT=73060); Future        Assessment: Healing right humeral tuberosity fracture     Plan: I recommend continued home exercises at this time. She may continue to return to activities as tolerated. Advised icing and oral anti-inflammatories as needed for pain. Follow up as needed for right humerus.     Follow Up: Return if symptoms worsen or fail to improve.    Cristina Tamayo PA-C

## 2024-05-07 ENCOUNTER — TELEPHONE (OUTPATIENT)
Dept: FAMILY MEDICINE CLINIC | Facility: CLINIC | Age: 69
End: 2024-05-07

## 2024-05-07 ENCOUNTER — APPOINTMENT (OUTPATIENT)
Dept: PHYSICAL THERAPY | Age: 69
End: 2024-05-07
Attending: FAMILY MEDICINE
Payer: MEDICARE

## 2024-05-14 ENCOUNTER — APPOINTMENT (OUTPATIENT)
Dept: PHYSICAL THERAPY | Age: 69
End: 2024-05-14
Attending: FAMILY MEDICINE
Payer: MEDICARE

## 2024-07-07 ENCOUNTER — HOSPITAL ENCOUNTER (INPATIENT)
Facility: HOSPITAL | Age: 69
LOS: 2 days | Discharge: HOME HEALTH | End: 2024-07-09
Attending: EMERGENCY MEDICINE | Admitting: HOSPITALIST
Payer: MEDICARE

## 2024-07-07 ENCOUNTER — APPOINTMENT (OUTPATIENT)
Dept: MRI IMAGING | Facility: HOSPITAL | Age: 69
End: 2024-07-07
Attending: EMERGENCY MEDICINE
Payer: MEDICARE

## 2024-07-07 DIAGNOSIS — I63.9 CEREBROVASCULAR ACCIDENT (CVA), UNSPECIFIED MECHANISM (HCC): Primary | ICD-10-CM

## 2024-07-07 LAB
ALBUMIN SERPL-MCNC: 4.2 G/DL (ref 3.2–4.8)
ALBUMIN/GLOB SERPL: 1.6 {RATIO} (ref 1–2)
ALP LIVER SERPL-CCNC: 93 U/L
ALT SERPL-CCNC: 14 U/L
ANION GAP SERPL CALC-SCNC: 10 MMOL/L (ref 0–18)
AST SERPL-CCNC: 19 U/L (ref ?–34)
BASOPHILS # BLD AUTO: 0.04 X10(3) UL (ref 0–0.2)
BASOPHILS NFR BLD AUTO: 0.3 %
BILIRUB SERPL-MCNC: 0.7 MG/DL (ref 0.2–1.1)
BUN BLD-MCNC: 9 MG/DL (ref 9–23)
BUN/CREAT SERPL: 12.9 (ref 10–20)
CALCIUM BLD-MCNC: 9.3 MG/DL (ref 8.7–10.4)
CHLORIDE SERPL-SCNC: 106 MMOL/L (ref 98–112)
CO2 SERPL-SCNC: 25 MMOL/L (ref 21–32)
CREAT BLD-MCNC: 0.7 MG/DL
DEPRECATED RDW RBC AUTO: 40.7 FL (ref 35.1–46.3)
EGFRCR SERPLBLD CKD-EPI 2021: 94 ML/MIN/1.73M2 (ref 60–?)
EOSINOPHIL # BLD AUTO: 0.07 X10(3) UL (ref 0–0.7)
EOSINOPHIL NFR BLD AUTO: 0.4 %
ERYTHROCYTE [DISTWIDTH] IN BLOOD BY AUTOMATED COUNT: 12.4 % (ref 11–15)
EST. AVERAGE GLUCOSE BLD GHB EST-MCNC: 375 MG/DL (ref 68–126)
GLOBULIN PLAS-MCNC: 2.6 G/DL (ref 2–3.5)
GLUCOSE BLD-MCNC: 196 MG/DL (ref 70–99)
GLUCOSE BLDC GLUCOMTR-MCNC: 127 MG/DL (ref 70–99)
GLUCOSE BLDC GLUCOMTR-MCNC: 166 MG/DL (ref 70–99)
GLUCOSE BLDC GLUCOMTR-MCNC: 171 MG/DL (ref 70–99)
HBA1C MFR BLD: 14.7 % (ref ?–5.7)
HCT VFR BLD AUTO: 43.3 %
HGB BLD-MCNC: 15.2 G/DL
IMM GRANULOCYTES # BLD AUTO: 0.07 X10(3) UL (ref 0–1)
IMM GRANULOCYTES NFR BLD: 0.4 %
LIPASE SERPL-CCNC: 27 U/L (ref 13–75)
LYMPHOCYTES # BLD AUTO: 2.4 X10(3) UL (ref 1–4)
LYMPHOCYTES NFR BLD AUTO: 15.4 %
MCH RBC QN AUTO: 31.1 PG (ref 26–34)
MCHC RBC AUTO-ENTMCNC: 35.1 G/DL (ref 31–37)
MCV RBC AUTO: 88.7 FL
MONOCYTES # BLD AUTO: 0.96 X10(3) UL (ref 0.1–1)
MONOCYTES NFR BLD AUTO: 6.2 %
NEUTROPHILS # BLD AUTO: 12.05 X10 (3) UL (ref 1.5–7.7)
NEUTROPHILS # BLD AUTO: 12.05 X10(3) UL (ref 1.5–7.7)
NEUTROPHILS NFR BLD AUTO: 77.3 %
OSMOLALITY SERPL CALC.SUM OF ELEC: 296 MOSM/KG (ref 275–295)
PLATELET # BLD AUTO: 319 10(3)UL (ref 150–450)
POTASSIUM SERPL-SCNC: 3.5 MMOL/L (ref 3.5–5.1)
PROT SERPL-MCNC: 6.8 G/DL (ref 5.7–8.2)
RBC # BLD AUTO: 4.88 X10(6)UL
SODIUM SERPL-SCNC: 141 MMOL/L (ref 136–145)
TROPONIN I SERPL HS-MCNC: 14 NG/L
WBC # BLD AUTO: 15.6 X10(3) UL (ref 4–11)

## 2024-07-07 PROCEDURE — 99223 1ST HOSP IP/OBS HIGH 75: CPT | Performed by: HOSPITALIST

## 2024-07-07 PROCEDURE — 70551 MRI BRAIN STEM W/O DYE: CPT | Performed by: EMERGENCY MEDICINE

## 2024-07-07 RX ORDER — BISACODYL 10 MG
10 SUPPOSITORY, RECTAL RECTAL
Status: DISCONTINUED | OUTPATIENT
Start: 2024-07-07 | End: 2024-07-09

## 2024-07-07 RX ORDER — ONDANSETRON 2 MG/ML
4 INJECTION INTRAMUSCULAR; INTRAVENOUS EVERY 6 HOURS PRN
Status: DISCONTINUED | OUTPATIENT
Start: 2024-07-07 | End: 2024-07-09

## 2024-07-07 RX ORDER — HYDROCODONE BITARTRATE AND ACETAMINOPHEN 5; 325 MG/1; MG/1
1 TABLET ORAL EVERY 4 HOURS PRN
Status: DISCONTINUED | OUTPATIENT
Start: 2024-07-07 | End: 2024-07-09

## 2024-07-07 RX ORDER — ASPIRIN 300 MG/1
300 SUPPOSITORY RECTAL DAILY
Status: DISCONTINUED | OUTPATIENT
Start: 2024-07-08 | End: 2024-07-08

## 2024-07-07 RX ORDER — ENOXAPARIN SODIUM 100 MG/ML
40 INJECTION SUBCUTANEOUS DAILY
Status: DISCONTINUED | OUTPATIENT
Start: 2024-07-08 | End: 2024-07-09

## 2024-07-07 RX ORDER — MECLIZINE HYDROCHLORIDE 25 MG/1
25 TABLET ORAL ONCE
Status: COMPLETED | OUTPATIENT
Start: 2024-07-07 | End: 2024-07-07

## 2024-07-07 RX ORDER — ATORVASTATIN CALCIUM 80 MG/1
80 TABLET, FILM COATED ORAL NIGHTLY
Status: DISCONTINUED | OUTPATIENT
Start: 2024-07-07 | End: 2024-07-09

## 2024-07-07 RX ORDER — POLYETHYLENE GLYCOL 3350 17 G/17G
17 POWDER, FOR SOLUTION ORAL DAILY PRN
Status: DISCONTINUED | OUTPATIENT
Start: 2024-07-07 | End: 2024-07-09

## 2024-07-07 RX ORDER — HYDRALAZINE HYDROCHLORIDE 20 MG/ML
10 INJECTION INTRAMUSCULAR; INTRAVENOUS EVERY 2 HOUR PRN
Status: DISCONTINUED | OUTPATIENT
Start: 2024-07-07 | End: 2024-07-09

## 2024-07-07 RX ORDER — ACETAMINOPHEN 325 MG/1
650 TABLET ORAL EVERY 4 HOURS PRN
Status: DISCONTINUED | OUTPATIENT
Start: 2024-07-07 | End: 2024-07-09

## 2024-07-07 RX ORDER — METOCLOPRAMIDE HYDROCHLORIDE 5 MG/ML
10 INJECTION INTRAMUSCULAR; INTRAVENOUS EVERY 8 HOURS PRN
Status: DISCONTINUED | OUTPATIENT
Start: 2024-07-07 | End: 2024-07-07

## 2024-07-07 RX ORDER — ASPIRIN 325 MG
325 TABLET ORAL DAILY
Status: DISCONTINUED | OUTPATIENT
Start: 2024-07-08 | End: 2024-07-08

## 2024-07-07 RX ORDER — HYDROCODONE BITARTRATE AND ACETAMINOPHEN 5; 325 MG/1; MG/1
2 TABLET ORAL EVERY 4 HOURS PRN
Status: DISCONTINUED | OUTPATIENT
Start: 2024-07-07 | End: 2024-07-09

## 2024-07-07 RX ORDER — METOCLOPRAMIDE HYDROCHLORIDE 5 MG/ML
10 INJECTION INTRAMUSCULAR; INTRAVENOUS EVERY 8 HOURS PRN
Status: DISCONTINUED | OUTPATIENT
Start: 2024-07-07 | End: 2024-07-09

## 2024-07-07 RX ORDER — ONDANSETRON 2 MG/ML
4 INJECTION INTRAMUSCULAR; INTRAVENOUS EVERY 6 HOURS PRN
Status: DISCONTINUED | OUTPATIENT
Start: 2024-07-07 | End: 2024-07-07

## 2024-07-07 RX ORDER — LABETALOL HYDROCHLORIDE 5 MG/ML
10 INJECTION, SOLUTION INTRAVENOUS EVERY 10 MIN PRN
Status: DISCONTINUED | OUTPATIENT
Start: 2024-07-07 | End: 2024-07-09

## 2024-07-07 RX ORDER — ENEMA 19; 7 G/133ML; G/133ML
1 ENEMA RECTAL ONCE AS NEEDED
Status: DISCONTINUED | OUTPATIENT
Start: 2024-07-07 | End: 2024-07-09

## 2024-07-07 RX ORDER — DEXTROSE MONOHYDRATE 25 G/50ML
50 INJECTION, SOLUTION INTRAVENOUS
Status: DISCONTINUED | OUTPATIENT
Start: 2024-07-07 | End: 2024-07-09

## 2024-07-07 RX ORDER — SODIUM CHLORIDE 9 MG/ML
INJECTION, SOLUTION INTRAVENOUS CONTINUOUS
Status: DISCONTINUED | OUTPATIENT
Start: 2024-07-07 | End: 2024-07-08

## 2024-07-07 RX ORDER — NICOTINE POLACRILEX 4 MG
30 LOZENGE BUCCAL
Status: DISCONTINUED | OUTPATIENT
Start: 2024-07-07 | End: 2024-07-09

## 2024-07-07 RX ORDER — SENNOSIDES 8.6 MG
17.2 TABLET ORAL NIGHTLY PRN
Status: DISCONTINUED | OUTPATIENT
Start: 2024-07-07 | End: 2024-07-09

## 2024-07-07 RX ORDER — NICOTINE POLACRILEX 4 MG
15 LOZENGE BUCCAL
Status: DISCONTINUED | OUTPATIENT
Start: 2024-07-07 | End: 2024-07-09

## 2024-07-07 RX ORDER — ASPIRIN 81 MG/1
324 TABLET, CHEWABLE ORAL ONCE
Status: COMPLETED | OUTPATIENT
Start: 2024-07-07 | End: 2024-07-07

## 2024-07-07 RX ORDER — HEPARIN SODIUM 5000 [USP'U]/ML
5000 INJECTION, SOLUTION INTRAVENOUS; SUBCUTANEOUS EVERY 8 HOURS SCHEDULED
Status: DISCONTINUED | OUTPATIENT
Start: 2024-07-07 | End: 2024-07-07

## 2024-07-07 RX ORDER — ACETAMINOPHEN 650 MG/1
650 SUPPOSITORY RECTAL EVERY 4 HOURS PRN
Status: DISCONTINUED | OUTPATIENT
Start: 2024-07-07 | End: 2024-07-09

## 2024-07-07 NOTE — ED QUICK NOTES
Orders for admission, patient is aware of plan and ready to go upstairs. Any questions, please call ED RN Sandrita at extension [phone].     Patient Covid vaccination status: Fully vaccinated     COVID Test Ordered in ED: None    COVID Suspicion at Admission: N/A    Running Infusions:  None    Mental Status/LOC at time of transport: a&ox4    Other pertinent information:   CIWA score: N/A   NIH score:  N/A

## 2024-07-07 NOTE — ED PROVIDER NOTES
Patient Seen in: [facility] Emergency Department      History     Chief Complaint   Patient presents with    Hyperglycemia     Stated Complaint: Hyperglycemia    Subjective:   HPI    68 yo female with multiple medical problems presents for evaluation of vomiting and high blood sugar. Pt reports dizziness described as a vertigo since yesterday. Around the same time developed nausea and vomiting. She checked her blood sugar yesterday and found it was in the 200s, she has not checked it in a long time. Was previously on insulin 70/30 but has not taken it for the past few months. She denies fever, chills, diarrhea, abdominal pain, focal weakness or numbness, diplopia, chest pain, SOB, lightheadedness or syncope.    Objective:   Past Medical History:    Diabetes (HCC)    High blood pressure    Hyperlipidemia              Past Surgical History:   Procedure Laterality Date    Hysterectomy                  Social History     Socioeconomic History    Marital status:    Tobacco Use    Smoking status: Never    Smokeless tobacco: Never   Vaping Use    Vaping status: Never Used   Substance and Sexual Activity    Alcohol use: Never    Drug use: Never     Social Determinants of Health     Food Insecurity: No Food Insecurity (7/7/2024)    Food Insecurity     Food Insecurity: Never true   Transportation Needs: No Transportation Needs (7/7/2024)    Transportation Needs     Lack of Transportation: No   Housing Stability: Low Risk  (7/7/2024)    Housing Stability     Housing Instability: No              Review of Systems    Positive for stated Chief Complaint: Hyperglycemia    Other systems are as noted in HPI.  Constitutional and vital signs reviewed.      All other systems reviewed and negative except as noted above.    Physical Exam     ED Triage Vitals [07/07/24 1405]   /89   Pulse 81   Resp 18   Temp 97.6 °F (36.4 °C)   Temp src Temporal   SpO2 97 %   O2 Device None (Room air)       Current Vitals:   Vital  Signs  BP: (!) 185/82  Pulse: 70  Resp: 20  Temp: 98.3 °F (36.8 °C)  Temp src: Oral  MAP (mmHg): (!) 110    Oxygen Therapy  SpO2: 98 %  O2 Device: None (Room air)            Physical Exam  Vitals and nursing note reviewed.   Constitutional:       General: She is not in acute distress.     Appearance: She is well-developed.   HENT:      Head: Normocephalic and atraumatic.   Eyes:      Extraocular Movements: Extraocular movements intact.      Conjunctiva/sclera: Conjunctivae normal.      Pupils: Pupils are equal, round, and reactive to light.   Cardiovascular:      Rate and Rhythm: Normal rate and regular rhythm.      Heart sounds: Normal heart sounds.   Pulmonary:      Effort: Pulmonary effort is normal. No respiratory distress.      Breath sounds: Normal breath sounds.   Abdominal:      General: Bowel sounds are normal. There is no distension.      Palpations: Abdomen is soft.      Tenderness: There is no abdominal tenderness. There is no guarding or rebound.   Musculoskeletal:         General: Normal range of motion.      Cervical back: Normal range of motion and neck supple.   Skin:     General: Skin is warm and dry.      Findings: No rash.   Neurological:      General: No focal deficit present.      Mental Status: She is alert and oriented to person, place, and time.      Cranial Nerves: No cranial nerve deficit.      Sensory: No sensory deficit.      Motor: No weakness.               ED Course     Labs Reviewed   COMP METABOLIC PANEL (14) - Abnormal; Notable for the following components:       Result Value    Glucose 196 (*)     Calculated Osmolality 296 (*)     All other components within normal limits   HEMOGLOBIN A1C - Abnormal; Notable for the following components:    HgbA1C 14.7 (*)     Estimated Average Glucose 375 (*)     All other components within normal limits   POCT GLUCOSE - Abnormal; Notable for the following components:    POC Glucose  171 (*)     All other components within normal limits   POCT  GLUCOSE - Abnormal; Notable for the following components:    POC Glucose  166 (*)     All other components within normal limits   POCT GLUCOSE - Abnormal; Notable for the following components:    POC Glucose  127 (*)     All other components within normal limits   CBC W/ DIFFERENTIAL - Abnormal; Notable for the following components:    WBC 15.6 (*)     Neutrophil Absolute Prelim 12.05 (*)     Neutrophil Absolute 12.05 (*)     All other components within normal limits   LIPASE - Normal   TROPONIN I HIGH SENSITIVITY - Normal   CBC WITH DIFFERENTIAL WITH PLATELET    Narrative:     The following orders were created for panel order CBC With Differential With Platelet.  Procedure                               Abnormality         Status                     ---------                               -----------         ------                     CBC W/ DIFFERENTIAL[[id number]]          Abnormal            Final result                 Please view results for these tests on the individual orders.   RAINBOW DRAW LAVENDER   RAINBOW DRAW LIGHT GREEN   RAINBOW DRAW BLUE   RAINBOW DRAW GOLD     EKG    Rate, intervals and axes as noted on EKG Report.  Rate: 67  Rhythm: Sinus Rhythm  Reading: Sinus rhythm, no STEMI                 Imaging Results Available and Reviewed while in ED:   MRI BRAIN WO ACUTE (3) SEQUENCE (CPT=70551)   Final Result   PROCEDURE: MRI BRAIN WO ACUTE (3) SEQUENCE(CPT=70551)       COMPARISON: [facility], CT BRAIN OR HEAD (CPT=70450),    12/17/2023, 4:25 PM.       INDICATIONS: Hyperglycemia       TECHNIQUE: A variety of imaging planes and parameters were utilized for    visualization of suspected pathology.  Images were performed without    contrast.       FINDINGS:        Punctate acute infarct involving the periventricular white matter along    the left aspect of 4th ventricle (5:36).  There is restricted diffusion    and T2/FLAIR hyperintensity in this region.       There are scattered foci and  patchy areas of T2/FLAIR hyperintensity    involving the periventricular and subcortical white matter as well as the    nayt, which is compatible with mild chronic microvascular ischemic    changes.       No acute intracranial hemorrhage or extra-axial fluid collection.  No    abnormal parenchymal gradient susceptibility artifact.       There is mild global parenchymal volume loss with prominence of the    extra-axial spaces and ventricular system.  No hydrocephalus.  There is no    midline shift or mass effect.  The basal cisterns are intact.       No suspicious marrow replacing lesion the calvarium, skull base, or upper    cervical spine.  There is minimal ethmoid sinus mucosal thickening.  There    is leftward deviation of the nasal septum.  The mastoid air cells are well    aerated.                     =====   CONCLUSION:        Punctate acute infarct involving the periventricular white matter along    the left aspect of the 4th ventricle.       Mild chronic microvascular ischemic changes.       Mild global parenchymal volume loss.       Lesser incidental findings described above.               Dictated by (CST): Kamran Irving MD on 7/07/2024 at 5:56 PM        Finalized by (CST): Kamran Irving MD on 7/07/2024 at 6:02 PM               CTA BRAIN + CTA CAROTIDS (CPT=70496/43422)    (Results Pending)       ED Medications Administered:   Medications   sodium chloride 0.9% infusion (has no administration in time range)   acetaminophen (Tylenol) tab 650 mg (has no administration in time range)     Or   acetaminophen (Tylenol) rectal suppository 650 mg (has no administration in time range)   labetalol (Trandate) 5 mg/mL injection 10 mg (has no administration in time range)   hydrALAzine (Apresoline) 20 mg/mL injection 10 mg (has no administration in time range)   metoclopramide (Reglan) 5 mg/mL injection 10 mg (has no administration in time range)   aspirin 300 MG rectal suppository 300 mg (has no administration in  time range)     Or   aspirin tab 325 mg (has no administration in time range)   atorvastatin (Lipitor) tab 80 mg (has no administration in time range)   enoxaparin (Lovenox) 40 MG/0.4ML SUBQ injection 40 mg (has no administration in time range)   sodium chloride 0.9% infusion (has no administration in time range)   acetaminophen (Tylenol) tab 650 mg (has no administration in time range)     Or   HYDROcodone-acetaminophen (Norco) 5-325 MG per tab 1 tablet (has no administration in time range)     Or   HYDROcodone-acetaminophen (Norco) 5-325 MG per tab 2 tablet (has no administration in time range)   ondansetron (Zofran) 4 MG/2ML injection 4 mg (has no administration in time range)   polyethylene glycol (PEG 3350) (Miralax) 17 g oral packet 17 g (has no administration in time range)   sennosides (Senokot) tab 17.2 mg (has no administration in time range)   bisacodyl (Dulcolax) 10 MG rectal suppository 10 mg (has no administration in time range)   fleet enema (Fleet) 7-19 GM/118ML rectal enema 133 mL (has no administration in time range)   glucose (Dex4) 15 GM/59ML oral liquid 15 g (has no administration in time range)     Or   glucose (Glutose) 40% oral gel 15 g (has no administration in time range)     Or   glucose-vitamin C (Dex-4) chewable tab 4 tablet (has no administration in time range)     Or   dextrose 50% injection 50 mL (has no administration in time range)     Or   glucose (Dex4) 15 GM/59ML oral liquid 30 g (has no administration in time range)     Or   glucose (Glutose) 40% oral gel 30 g (has no administration in time range)     Or   glucose-vitamin C (Dex-4) chewable tab 8 tablet (has no administration in time range)   insulin aspart (NovoLOG) 100 Units/mL FlexPen 1-5 Units (has no administration in time range)   sodium chloride 0.9 % IV bolus 1,000 mL (0 mL Intravenous Stopped 7/7/24 1602)   meclizine (Antivert) tab 25 mg (25 mg Oral Given 7/7/24 1521)   aspirin chewable tab 324 mg (324 mg Oral Given  7/7/24 1842)         Vitals:    07/07/24 2043 07/07/24 2045 07/07/24 2101 07/07/24 2111   BP: (!) 186/86  (!) 185/82    BP Location: Right arm  Right arm    Pulse: 77   70   Resp: 20      Temp: 98.3 °F (36.8 °C)      TempSrc: Oral      SpO2: 98%      Weight:  72.4 kg     Height:         *I personally reviewed and interpreted all ED vitals.           MDM        Admission disposition: 7/7/2024  6:39 PM                                        Medical Decision Making  Ddx includes BPPV, meniere's, TIA, stroke, DKA, electrolyte imbalance, ACS, malignancy, ICH    Patient reports feeling better after meclizine, MRI results discussed with patient and family at the bedside.  Given ASA. Discussed with and admitted to Dr. Cruz who saw the patient in the emergency department.  Discussed with Dr. Pizarro, no further recommendations for the emergency department    Problems Addressed:  Cerebrovascular accident (CVA), unspecified mechanism (HCC): acute illness or injury    Amount and/or Complexity of Data Reviewed  External Data Reviewed: labs.     Details: Leukocytosis is new today, otherwise CBC and BMP stable compared to 3/13/2023  Labs: ordered.  Radiology: ordered.  ECG/medicine tests: ordered and independent interpretation performed. Decision-making details documented in ED Course.  Discussion of management or test interpretation with external provider(s): Discussed with hospitalist and neurology        Disposition and Plan     Clinical Impression:  1. Cerebrovascular accident (CVA), unspecified mechanism (HCC)         Disposition:  Admit  7/7/2024  6:39 pm    Follow-up:  No follow-up provider specified.  We recommend that you schedule follow up care with a primary care provider within the next three months to obtain basic health screening including reassessment of your blood pressure.      Medications Prescribed:  Current Discharge Medication List                            Hospital Problems       Present on Admission  Date  Reviewed: 5/2/2024            ICD-10-CM Noted POA    * (Principal) Cerebrovascular accident (CVA), unspecified mechanism (HCC) I63.9 7/7/2024 Unknown    Acute CVA (cerebrovascular accident) (HCC) I63.9 7/7/2024 Unknown

## 2024-07-08 ENCOUNTER — APPOINTMENT (OUTPATIENT)
Dept: CT IMAGING | Facility: HOSPITAL | Age: 69
End: 2024-07-08
Attending: INTERNAL MEDICINE
Payer: MEDICARE

## 2024-07-08 ENCOUNTER — APPOINTMENT (OUTPATIENT)
Dept: CV DIAGNOSTICS | Facility: HOSPITAL | Age: 69
End: 2024-07-08
Attending: INTERNAL MEDICINE
Payer: MEDICARE

## 2024-07-08 LAB
ANION GAP SERPL CALC-SCNC: 6 MMOL/L (ref 0–18)
ATRIAL RATE: 67 BPM
BASOPHILS # BLD AUTO: 0.04 X10(3) UL (ref 0–0.2)
BASOPHILS NFR BLD AUTO: 0.3 %
BUN BLD-MCNC: 10 MG/DL (ref 9–23)
BUN/CREAT SERPL: 14.7 (ref 10–20)
CALCIUM BLD-MCNC: 8.9 MG/DL (ref 8.7–10.4)
CHLORIDE SERPL-SCNC: 110 MMOL/L (ref 98–112)
CHOLEST SERPL-MCNC: 201 MG/DL (ref ?–200)
CO2 SERPL-SCNC: 27 MMOL/L (ref 21–32)
CREAT BLD-MCNC: 0.68 MG/DL
DEPRECATED RDW RBC AUTO: 41.1 FL (ref 35.1–46.3)
EGFRCR SERPLBLD CKD-EPI 2021: 94 ML/MIN/1.73M2 (ref 60–?)
EOSINOPHIL # BLD AUTO: 0.22 X10(3) UL (ref 0–0.7)
EOSINOPHIL NFR BLD AUTO: 1.8 %
ERYTHROCYTE [DISTWIDTH] IN BLOOD BY AUTOMATED COUNT: 12.5 % (ref 11–15)
GLUCOSE BLD-MCNC: 208 MG/DL (ref 70–99)
GLUCOSE BLDC GLUCOMTR-MCNC: 198 MG/DL (ref 70–99)
GLUCOSE BLDC GLUCOMTR-MCNC: 281 MG/DL (ref 70–99)
GLUCOSE BLDC GLUCOMTR-MCNC: 287 MG/DL (ref 70–99)
GLUCOSE BLDC GLUCOMTR-MCNC: 294 MG/DL (ref 70–99)
HCT VFR BLD AUTO: 39.1 %
HDLC SERPL-MCNC: 44 MG/DL (ref 40–59)
HGB BLD-MCNC: 13.9 G/DL
IMM GRANULOCYTES # BLD AUTO: 0.06 X10(3) UL (ref 0–1)
IMM GRANULOCYTES NFR BLD: 0.5 %
LDLC SERPL CALC-MCNC: 136 MG/DL (ref ?–100)
LYMPHOCYTES # BLD AUTO: 3.04 X10(3) UL (ref 1–4)
LYMPHOCYTES NFR BLD AUTO: 24.8 %
MCH RBC QN AUTO: 31.7 PG (ref 26–34)
MCHC RBC AUTO-ENTMCNC: 35.5 G/DL (ref 31–37)
MCV RBC AUTO: 89.1 FL
MONOCYTES # BLD AUTO: 1.08 X10(3) UL (ref 0.1–1)
MONOCYTES NFR BLD AUTO: 8.8 %
NEUTROPHILS # BLD AUTO: 7.83 X10 (3) UL (ref 1.5–7.7)
NEUTROPHILS # BLD AUTO: 7.83 X10(3) UL (ref 1.5–7.7)
NEUTROPHILS NFR BLD AUTO: 63.8 %
NONHDLC SERPL-MCNC: 157 MG/DL (ref ?–130)
OSMOLALITY SERPL CALC.SUM OF ELEC: 301 MOSM/KG (ref 275–295)
P AXIS: 31 DEGREES
P-R INTERVAL: 164 MS
PLATELET # BLD AUTO: 256 10(3)UL (ref 150–450)
POTASSIUM SERPL-SCNC: 3.2 MMOL/L (ref 3.5–5.1)
POTASSIUM SERPL-SCNC: 4 MMOL/L (ref 3.5–5.1)
Q-T INTERVAL: 392 MS
QRS DURATION: 82 MS
QTC CALCULATION (BEZET): 414 MS
R AXIS: -9 DEGREES
RBC # BLD AUTO: 4.39 X10(6)UL
SODIUM SERPL-SCNC: 143 MMOL/L (ref 136–145)
T AXIS: 89 DEGREES
TRIGL SERPL-MCNC: 117 MG/DL (ref 30–149)
VENTRICULAR RATE: 67 BPM
VLDLC SERPL CALC-MCNC: 21 MG/DL (ref 0–30)
WBC # BLD AUTO: 12.3 X10(3) UL (ref 4–11)

## 2024-07-08 PROCEDURE — 93306 TTE W/DOPPLER COMPLETE: CPT | Performed by: INTERNAL MEDICINE

## 2024-07-08 PROCEDURE — 99233 SBSQ HOSP IP/OBS HIGH 50: CPT | Performed by: HOSPITALIST

## 2024-07-08 PROCEDURE — 70498 CT ANGIOGRAPHY NECK: CPT | Performed by: INTERNAL MEDICINE

## 2024-07-08 PROCEDURE — 99222 1ST HOSP IP/OBS MODERATE 55: CPT | Performed by: INTERNAL MEDICINE

## 2024-07-08 PROCEDURE — 70496 CT ANGIOGRAPHY HEAD: CPT | Performed by: INTERNAL MEDICINE

## 2024-07-08 RX ORDER — POTASSIUM CHLORIDE 20 MEQ/1
40 TABLET, EXTENDED RELEASE ORAL EVERY 4 HOURS
Status: COMPLETED | OUTPATIENT
Start: 2024-07-08 | End: 2024-07-08

## 2024-07-08 RX ORDER — LISINOPRIL 40 MG/1
40 TABLET ORAL DAILY
Status: DISCONTINUED | OUTPATIENT
Start: 2024-07-08 | End: 2024-07-09

## 2024-07-08 RX ORDER — CLOPIDOGREL BISULFATE 75 MG/1
75 TABLET ORAL DAILY
Status: DISCONTINUED | OUTPATIENT
Start: 2024-07-08 | End: 2024-07-09

## 2024-07-08 RX ORDER — ASPIRIN 81 MG/1
81 TABLET ORAL DAILY
Status: DISCONTINUED | OUTPATIENT
Start: 2024-07-09 | End: 2024-07-09

## 2024-07-08 RX ORDER — METOPROLOL SUCCINATE 25 MG/1
25 TABLET, EXTENDED RELEASE ORAL
Status: DISCONTINUED | OUTPATIENT
Start: 2024-07-08 | End: 2024-07-09

## 2024-07-08 RX ORDER — AMLODIPINE BESYLATE 10 MG/1
10 TABLET ORAL EVERY MORNING
Status: DISCONTINUED | OUTPATIENT
Start: 2024-07-09 | End: 2024-07-09

## 2024-07-08 NOTE — PLAN OF CARE
No acute changes overnight. No complaints of pain. Neuros Q2. IVF infusing. Safety precautions maintained and in place. Plan for CTA brain/carotids, echo, PT/OT/SLP.    Problem: Patient Centered Care  Goal: Patient preferences are identified and integrated in the patient's plan of care  Description: Interventions:  - What would you like us to know as we care for you? From home w/  & daughter  - Provide timely, complete, and accurate information to patient/family  - Incorporate patient and family knowledge, values, beliefs, and cultural backgrounds into the planning and delivery of care  - Encourage patient/family to participate in care and decision-making at the level they choose  - Honor patient and family perspectives and choices  Outcome: Progressing     Problem: METABOLIC/FLUID AND ELECTROLYTES - ADULT  Goal: Glucose maintained within prescribed range  Description: INTERVENTIONS:  - Monitor Blood Glucose as ordered  - Assess for signs and symptoms of hyperglycemia and hypoglycemia  - Administer ordered medications to maintain glucose within target range  - Assess barriers to adequate nutritional intake and initiate nutrition consult as needed  - Instruct patient on self management of diabetes  Outcome: Progressing  Goal: Electrolytes maintained within normal limits  Description: INTERVENTIONS:  - Monitor labs and rhythm and assess patient for signs and symptoms of electrolyte imbalances  - Administer electrolyte replacement as ordered  - Monitor response to electrolyte replacements, including rhythm and repeat lab results as appropriate  - Fluid restriction as ordered  - Instruct patient on fluid and nutrition restrictions as appropriate  Outcome: Progressing  Goal: Hemodynamic stability and optimal renal function maintained  Description: INTERVENTIONS:  - Monitor labs and assess for signs and symptoms of volume excess or deficit  - Monitor intake, output and patient weight  - Monitor urine specific  gravity, serum osmolarity and serum sodium as indicated or ordered  - Monitor response to interventions for patient's volume status, including labs, urine output, blood pressure (other measures as available)  - Encourage oral intake as appropriate  - Instruct patient on fluid and nutrition restrictions as appropriate  Outcome: Progressing     Problem: NEUROLOGICAL - ADULT  Goal: Achieves stable or improved neurological status  Description: INTERVENTIONS  - Assess for and report changes in neurological status  - Initiate measures to prevent increased intracranial pressure  - Maintain blood pressure and fluid volume within ordered parameters to optimize cerebral perfusion and minimize risk of hemorrhage  - Monitor temperature, glucose, and sodium. Initiate appropriate interventions as ordered  Outcome: Progressing  Goal: Absence of seizures  Description: INTERVENTIONS  - Monitor for seizure activity  - Administer anti-seizure medications as ordered  - Monitor neurological status  Outcome: Progressing  Goal: Remains free of injury related to seizure activity  Description: INTERVENTIONS:  - Maintain airway, patient safety  and administer oxygen as ordered  - Monitor patient for seizure activity, document and report duration and description of seizure to MD/LIP  - If seizure occurs, turn patient to side and suction secretions as needed  - Reorient patient post seizure  - Seizure pads on all 4 side rails  - Instruct patient/family to notify RN of any seizure activity  - Instruct patient/family to call for assistance with activity based on assessment  Outcome: Progressing  Goal: Achieves maximal functionality and self care  Description: INTERVENTIONS  - Monitor swallowing and airway patency with patient fatigue and changes in neurological status  - Encourage and assist patient to increase activity and self care with guidance from PT/OT  - Encourage visually impaired, hearing impaired and aphasic patients to use  assistive/communication devices  Outcome: Progressing     Problem: Patient/Family Goals  Goal: Patient/Family Long Term Goal  Description: Patient's Long Term Goal: Go home    Interventions:  - Monitor labs  - Diagnostic testing  - Follow MD orders  - See additional Care Plan goals for specific interventions  Outcome: Progressing  Goal: Patient/Family Short Term Goal  Description: Patient's Short Term Goal: Controlled blood sugars    Interventions:   - Accucheks  - Insulin coverage  - Follow MD orders  - See additional Care Plan goals for specific interventions  Outcome: Progressing

## 2024-07-08 NOTE — ED QUICK NOTES
Rounding Completed    Plan of Care reviewed. Waiting for room to go up.  Elimination needs assessed.  Provided update.    Bed is locked and in lowest position. Call light within reach.

## 2024-07-08 NOTE — PAYOR COMM NOTE
--------------  ADMISSION REVIEW     Payor: [employer]  Subscriber #:  [id number]  Authorization Number: [id number]    Admit date: 7/7/24  Admit time:  8:40 PM       REVIEW DOCUMENTATION:  ED Provider Notes signed by Shawna Li MD at 7/7/2024 10:01 PM      Patient Seen in: [facility] Emergency Department    History     Chief Complaint   Patient presents with    Hyperglycemia     Stated Complaint: Hyperglycemia  HPI  68 yo female with multiple medical problems presents for evaluation of vomiting and high blood sugar. Pt reports dizziness described as a vertigo since yesterday. Around the same time developed nausea and vomiting. She checked her blood sugar yesterday and found it was in the 200s, she has not checked it in a long time. Was previously on insulin 70/30 but has not taken it for the past few months. She denies fever, chills, diarrhea, abdominal pain, focal weakness or numbness, diplopia, chest pain, SOB, lightheadedness or syncope.    Past Medical History:    Diabetes (HCC)    High blood pressure    Hyperlipidemia     Past Surgical History:   Procedure Laterality Date    Hysterectomy       Review of Systems  Positive for stated Chief Complaint: Hyperglycemia  Other systems are as noted in HPI.  Constitutional and vital signs reviewed.    All other systems reviewed and negative except as noted above.    Physical Exam     ED Triage Vitals [07/07/24 1405]   /89   Pulse 81   Resp 18   Temp 97.6 °F (36.4 °C)   Temp src Temporal   SpO2 97 %   O2 Device None (Room air)     Current Vitals:   Vital Signs  BP: (!) 185/82  Pulse: 70  Resp: 20  Temp: 98.3 °F (36.8 °C)  Temp src: Oral  MAP (mmHg): (!) 110    Oxygen Therapy  SpO2: 98 %  O2 Device: None (Room air)    Physical Exam  Vitals and nursing note reviewed.   Constitutional:       General: She is not in acute distress.     Appearance: She is well-developed.   HENT:      Head: Normocephalic and atraumatic.   Eyes:      Extraocular  Movements: Extraocular movements intact.      Conjunctiva/sclera: Conjunctivae normal.      Pupils: Pupils are equal, round, and reactive to light.   Cardiovascular:      Rate and Rhythm: Normal rate and regular rhythm.      Heart sounds: Normal heart sounds.   Pulmonary:      Effort: Pulmonary effort is normal. No respiratory distress.      Breath sounds: Normal breath sounds.   Abdominal:      General: Bowel sounds are normal. There is no distension.      Palpations: Abdomen is soft.      Tenderness: There is no abdominal tenderness. There is no guarding or rebound.   Musculoskeletal:         General: Normal range of motion.      Cervical back: Normal range of motion and neck supple.   Skin:     General: Skin is warm and dry.      Findings: No rash.   Neurological:      General: No focal deficit present.      Mental Status: She is alert and oriented to person, place, and time.      Cranial Nerves: No cranial nerve deficit.      Sensory: No sensory deficit.      Motor: No weakness.     ED Course     Labs Reviewed   COMP METABOLIC PANEL (14) - Abnormal; Notable for the following components:       Result Value    Glucose 196 (*)     Calculated Osmolality 296 (*)     All other components within normal limits   HEMOGLOBIN A1C - Abnormal; Notable for the following components:    HgbA1C 14.7 (*)     Estimated Average Glucose 375 (*)     All other components within normal limits   POCT GLUCOSE - Abnormal; Notable for the following components:    POC Glucose  171 (*)     All other components within normal limits   POCT GLUCOSE - Abnormal; Notable for the following components:    POC Glucose  166 (*)     All other components within normal limits   POCT GLUCOSE - Abnormal; Notable for the following components:    POC Glucose  127 (*)     All other components within normal limits   CBC W/ DIFFERENTIAL - Abnormal; Notable for the following components:    WBC 15.6 (*)     Neutrophil Absolute Prelim 12.05 (*)     Neutrophil  Absolute 12.05 (*)     All other components within normal limits   LIPASE - Normal   TROPONIN I HIGH SENSITIVITY - Normal   CBC WITH DIFFERENTIAL WITH PLATELET     EKG    Rate, intervals and axes as noted on EKG Report.  Rate: 67  Rhythm: Sinus Rhythm  Reading: Sinus rhythm, no STEMI    Imaging Results Available and Reviewed while in ED:   MRI BRAIN WO ACUTE (3) SEQUENCE (CPT=70551)   Final Result   CONCLUSION:    Punctate acute infarct involving the periventricular white matter along    the left aspect of the 4th ventricle.       Mild chronic microvascular ischemic changes.       Mild global parenchymal volume loss.      CTA BRAIN + CTA CAROTIDS (CPT=70496/43492)    (Results Pending)     ED Medications Administered:   Medications   sodium chloride 0.9% infusion (has no administration in time range)   acetaminophen (Tylenol) tab 650 mg (has no administration in time range)     Or   acetaminophen (Tylenol) rectal suppository 650 mg (has no administration in time range)   labetalol (Trandate) 5 mg/mL injection 10 mg (has no administration in time range)   hydrALAzine (Apresoline) 20 mg/mL injection 10 mg (has no administration in time range)   metoclopramide (Reglan) 5 mg/mL injection 10 mg (has no administration in time range)   aspirin 300 MG rectal suppository 300 mg (has no administration in time range)     Or   aspirin tab 325 mg (has no administration in time range)   atorvastatin (Lipitor) tab 80 mg (has no administration in time range)   enoxaparin (Lovenox) 40 MG/0.4ML SUBQ injection 40 mg (has no administration in time range)   sodium chloride 0.9% infusion (has no administration in time range)   acetaminophen (Tylenol) tab 650 mg (has no administration in time range)     Or   HYDROcodone-acetaminophen (Norco) 5-325 MG per tab 1 tablet (has no administration in time range)     Or   HYDROcodone-acetaminophen (Norco) 5-325 MG per tab 2 tablet (has no administration in time range)   ondansetron (Zofran) 4  MG/2ML injection 4 mg (has no administration in time range)   polyethylene glycol (PEG 3350) (Miralax) 17 g oral packet 17 g (has no administration in time range)   sennosides (Senokot) tab 17.2 mg (has no administration in time range)   bisacodyl (Dulcolax) 10 MG rectal suppository 10 mg (has no administration in time range)   fleet enema (Fleet) 7-19 GM/118ML rectal enema 133 mL (has no administration in time range)   glucose (Dex4) 15 GM/59ML oral liquid 15 g (has no administration in time range)     Or   glucose (Glutose) 40% oral gel 15 g (has no administration in time range)     Or   glucose-vitamin C (Dex-4) chewable tab 4 tablet (has no administration in time range)     Or   dextrose 50% injection 50 mL (has no administration in time range)     Or   glucose (Dex4) 15 GM/59ML oral liquid 30 g (has no administration in time range)     Or   glucose (Glutose) 40% oral gel 30 g (has no administration in time range)     Or   glucose-vitamin C (Dex-4) chewable tab 8 tablet (has no administration in time range)   insulin aspart (NovoLOG) 100 Units/mL FlexPen 1-5 Units (has no administration in time range)   sodium chloride 0.9 % IV bolus 1,000 mL (0 mL Intravenous Stopped 7/7/24 1602)   meclizine (Antivert) tab 25 mg (25 mg Oral Given 7/7/24 1521)   aspirin chewable tab 324 mg (324 mg Oral Given 7/7/24 1842)         Vitals:    07/07/24 2043 07/07/24 2045 07/07/24 2101 07/07/24 2111   BP: (!) 186/86  (!) 185/82    BP Location: Right arm  Right arm    Pulse: 77   70   Resp: 20      Temp: 98.3 °F (36.8 °C)      TempSrc: Oral      SpO2: 98%      Weight:  72.4 kg     Height:         *I personally reviewed and interpreted all ED vitals.    [facility]     Admission disposition: 7/7/2024  6:39 PM    Medical Decision Making  Ddx includes BPPV, meniere's, TIA, stroke, DKA, electrolyte imbalance, ACS, malignancy, ICH    Patient reports feeling better after meclizine, MRI results discussed with patient and family at the bedside.   Given ASA. Discussed with and admitted to Dr. Cruz who saw the patient in the emergency department.  Discussed with Dr. Pizarro, no further recommendations for the emergency department    Problems Addressed:  Cerebrovascular accident (CVA), unspecified mechanism (HCC): acute illness or injury    Amount and/or Complexity of Data Reviewed  External Data Reviewed: labs.     Details: Leukocytosis is new today, otherwise CBC and BMP stable compared to 3/13/2023  Labs: ordered.  Radiology: ordered.  ECG/medicine tests: ordered and independent interpretation performed. Decision-making details documented in ED Course.  Discussion of management or test interpretation with external provider(s): Discussed with hospitalist and neurology    Disposition and Plan     Clinical Impression:  1. Cerebrovascular accident (CVA), unspecified mechanism (HCC)      Disposition:  Admit  7/7/2024  6:39 pm    Hospital Problems       Present on Admission  Date Reviewed: 5/2/2024            ICD-10-CM Noted POA    * (Principal) Cerebrovascular accident (CVA), unspecified mechanism (HCC) I63.9 7/7/2024 Unknown    Acute CVA (cerebrovascular accident) (HCC) I63.9 7/7/2024 Unknown      Shawna Li MD on 7/7/2024 10:01 PM      History & Physical   Chief Complaint   Patient presents with    Hyperglycemia      HPI:   69 year old female who has a pmh of DM2, HTN, Dyslipidemia who was admitted for acute dizziness with nausea and vomiting with vertigo for the past 24 hours. Highest blood sugar reported was 293. He denies fever/chills, LOC, chest pain, sob and dyspnea on exertion. Vitals in the ER were /85, HR 76. MRI of the brain revealed a punctate acute infarct along the left aspect of the 4th ventricle. Patient will be admitted to the cardiac floor for further treatment and workup. Stroke order set has been completed, and Neurology has been consulted.       Blood pressure 158/85, pulse 76, temperature 97.6 °F (36.4 °C), temperature source  Temporal, resp. rate 16, height 5' 5\" (1.651 m), weight 150 lb (68 kg), SpO2 98%     Lab 07/07/24  1458   RBC 4.88   HGB 15.2   HCT 43.3   MCV 88.7   MCH 31.1   MCHC 35.1   RDW 12.4   NEPRELIM 12.05*   WBC 15.6*   .0     *   BUN 9   CREATSERUM 0.70   CA 9.3   ALB 4.2      K 3.5      CO2 25.0   ALKPHO 93   AST 19   ALT 14   BILT 0.7   TP 6.8       Assessment/Plan:     Cerebrovascular accident (CVA), unspecified mechanism (HCC)  - Neurology on board  - Allow permissive HTN  - Blood thinners and statin therapy per Neurology  - stroke order set completed  - MRI of the brain reviewed  - PT/OT eval     DM2  - SSI low dose  - accuchecks AC and HS     HTN  - allow permissive HTN     Dyslipidemia  - continue statin      DVT proph- heparin     Full code     MDM High.   JENSEN KELLY MD  7/7/2024      MEDICATIONS ADMINISTERED IN LAST 1 DAY:  acetaminophen (Tylenol) tab 650 mg       Date Action Dose Route User    7/8/2024 0908 Given 650 mg Oral Maribell Tee RN          aspirin tab 325 mg       Date Action Dose Route User    7/8/2024 0910 Given 325 mg Oral Maribell Tee RN          aspirin chewable tab 324 mg       Date Action Dose Route User    7/7/2024 1842 Given 324 mg Oral Jen Sinclair RN          atorvastatin (Lipitor) tab 80 mg       Date Action Dose Route User    7/7/2024 2204 Given 80 mg Oral Miryam Su RN          enoxaparin (Lovenox) 40 MG/0.4ML SUBQ injection 40 mg       Date Action Dose Route User    7/8/2024 0910 Given 40 mg Subcutaneous (Left Lower Abdomen) Maribell Tee RN          insulin aspart (NovoLOG) 100 Units/mL FlexPen 1-5 Units       Date Action Dose Route User    7/8/2024 1333 Given 4 Units Subcutaneous (Left Lower Arm) Maribell Tee RN    7/8/2024 0936 Given 1 Units Subcutaneous (Right Upper Arm) Maribell Tee RN          iopamidol 76% (ISOVUE-370) injection for power injector       Date Action Dose Route User    7/8/2024 0834 Given 50 mL  Intravenous Jose M Baez          lisinopril (Prinivil; Zestril) tab 40 mg       Date Action Dose Route User    7/8/2024 0936 Given 40 mg Oral Maribell Tee RN          metoprolol succinate ER (Toprol XL) 24 hr tab 25 mg       Date Action Dose Route User    7/8/2024 0936 Given 25 mg Oral Maribell Tee RN          potassium chloride (Klor-Con M20) tab 40 mEq       Date Action Dose Route User    7/8/2024 1332 Given 40 mEq Oral Maribell Tee RN    7/8/2024 0910 Given 40 mEq Oral Maribell Tee RN          sodium chloride 0.9% infusion       Date Action Dose Route User    7/8/2024 0008 New Bag (none) Intravenous Miryam Su RN            Vitals (last day)       Date/Time Temp Pulse Resp BP SpO2 Weight O2 Device O2 Flow Rate (L/min) [facility]    07/08/24 1200 99 °F (37.2 °C) 73 18 141/67 94 % -- None (Room air) -- [place]    07/08/24 1113 -- 58 -- 147/89 -- -- -- -- AA    07/08/24 1055 -- -- -- 147/89 -- -- -- --     07/08/24 0800 98.4 °F (36.9 °C) 58 18 184/81 96 % -- None (Room air) -- [place]    07/08/24 0600 98.6 °F (37 °C) 76 16 151/66 97 % -- None (Room air) -- [place]    07/08/24 0400 98.5 °F (36.9 °C) 68 16 160/74 97 % -- None (Room air) -- [place]    07/08/24 0200 98.5 °F (36.9 °C) 66 16 127/58 96 % -- None (Room air) -- [place]    07/08/24 0000 98.6 °F (37 °C) 67 16 139/70 95 % -- None (Room air) -- [place]    07/07/24 2200 98.5 °F (36.9 °C) 88 20 173/82 97 % -- None (Room air) -- [place]    07/07/24 2111 -- 70 -- -- -- -- -- -- VK    07/07/24 2101 -- -- -- 185/82 -- -- -- -- SD    07/07/24 2045 -- -- -- -- -- 159 lb 11.2 oz (72.4 kg) -- -- [place]    07/07/24 2043 98.3 °F (36.8 °C) 77 20 186/86 98 % -- None (Room air) -- [place]    07/07/24 1945 -- 76 20 162/81 96 % -- None (Room air) --     07/07/24 1915 -- 76 16 158/85 98 % -- None (Room air) --     07/07/24 1900 -- 79 20 178/89 99 % -- None (Room air) -- [place]    07/07/24 1830 -- 72 20 166/77 94 % -- -- -- [place]    07/07/24 1800 -- 68 20 165/69 94 % -- None (Room air) -- [place]     07/07/24 1745 -- 71 16 -- 94 % -- None (Room air) -- [place]    07/07/24 1700 -- 72 15 169/88 98 % -- None (Room air) -- [place]    07/07/24 1615 -- 67 16 -- 98 % -- None (Room air) -- [place]    07/07/24 1600 -- 64 17 155/68 99 % -- None (Room air) -- [place]    07/07/24 1502 -- -- -- -- -- -- None (Room air) -- [place] 07/07/24 1500 -- 74 18 130/79 97 % -- None (Room air) -- [place]    07/07/24 1405 97.6 °F (36.4 °C) 81 18 153/89 97 % 150 lb (68 kg) None (Room air) --        FOR REVIEW/APPROVAL OF INPT ADMISSION

## 2024-07-08 NOTE — PHYSICAL THERAPY NOTE
PHYSICAL THERAPY EVALUATION - INPATIENT     Room Number: 315/315-A  Evaluation Date: 7/8/2024  Type of Evaluation: Initial   Physician Order: PT Eval and Treat    Presenting Problem: CVA     Reason for Therapy: Mobility Dysfunction and Discharge Planning    PHYSICAL THERAPY ASSESSMENT   Patient is a 69 year old female admitted 7/7/2024 for CVA.  Prior to admission, patient's baseline is independent with some recent furniture walking per pt's family report.  Patient is currently functioning below baseline with bed mobility, transfers, and gait.  Patient is requiring contact guard assist as a result of the following impairments: decreased functional strength, decreased endurance/aerobic capacity, and medical status.  Physical Therapy will continue to follow for duration of hospitalization.    Patient will benefit from continued skilled PT Services at discharge to promote functional independence and safety with additional support and return home with home health PT.    PLAN  PT Treatment Plan: Bed mobility;Body mechanics;Don/doff brace;Endurance;Patient education;Family education;Gait training;Range of motion;Stoop training;Transfer training;Balance training  Rehab Potential : Good  Frequency (Obs): 5x/week    PHYSICAL THERAPY MEDICAL/SOCIAL HISTORY   History related to current admission: acute CVA     Problem List  Principal Problem:    Cerebrovascular accident (CVA), unspecified mechanism (HCC)  Active Problems:    Acute CVA (cerebrovascular accident) (HCC)      HOME SITUATION  Home Situation  Type of Home: House  Home Layout: Able to live on main level  Lives With:  (spouse, dtr, TARAS)     Prior Level of [place]: independent    SUBJECTIVE  \"I have a cane, I guess I could use it more.\"     PHYSICAL THERAPY EXAMINATION   OBJECTIVE  Precautions: Bed/chair alarm  Fall Risk: High fall risk    WEIGHT BEARING RESTRICTION  Weight Bearing Restriction: None                PAIN ASSESSMENT     Location: denies        COGNITION  Overall Cognitive Status:  WFL - within functional limits    BALANCE  Static Sitting: Fair +  Dynamic Sitting: Fair  Static Standing: Fair -  Dynamic Standing: Fair -    ACTIVITY TOLERANCE           BP: 147/89  BP Location: Left arm  BP Method: Automatic  Patient Position: Lying    O2 WALK       AM-PAC '6-Clicks' INPATIENT SHORT FORM - BASIC MOBILITY  How much difficulty does the patient currently have...  Patient Difficulty: Turning over in bed (including adjusting bedclothes, sheets and blankets)?: None   Patient Difficulty: Sitting down on and standing up from a chair with arms (e.g., wheelchair, bedside commode, etc.): A Little   Patient Difficulty: Moving from lying on back to sitting on the side of the bed?: A Little   How much help from another person does the patient currently need...   Help from Another: Moving to and from a bed to a chair (including a wheelchair)?: A Little   Help from Another: Need to walk in hospital room?: A Little   Help from Another: Climbing 3-5 steps with a railing?: A Lot     AM-PAC Score:  Raw Score: 18   Approx Degree of Impairment: 46.58%   Standardized Score (AM-PAC Scale): 43.63   CMS Modifier (G-Code): CK    FUNCTIONAL ABILITY STATUS  Functional Mobility/Gait Assessment  Gait Assistance: Contact guard assist  Distance (ft):  (15' furniture walking CGA, 30' with RW SBA)  Assistive Device: Rolling walker;None  Pattern: Shuffle  Rolling: contact guard assist  Supine to Sit: contact guard assist  Sit to Supine: contact guard assist  Sit to Stand: contact guard assist    Exercise/Education Provided:  Bed mobility  Body mechanics  Functional activity tolerated  Gait training  Transfer training    The patient's Approx Degree of Impairment: 46.58% has been calculated based on documentation in the [facility] '6 clicks' Inpatient Basic Mobility Short Form.  Research supports that patients with this level of impairment may benefit from home with home health.  Final disposition  will be made by interdisciplinary medical team.    Patient End of Session: Up in chair;Needs met;Call light within reach;RN aware of session/findings;All patient questions and concerns addressed;Alarm set;Family present    CURRENT GOALS  Goals to be met by:   Patient Goal Patient's self-stated goal is: unstated    Goal #1 Patient is able to demonstrate supine - sit EOB @ level: supervision     Goal #1   Current Status    Goal #2 Patient is able to demonstrate transfers Sit to/from Stand at assistance level: supervision with cane - straight     Goal #2  Current Status    Goal #3 Patient is able to ambulate 150 feet with assist device: cane - straight at assistance level: supervision   Goal #3   Current Status    Goal #4 Patient will negotiate 2 stairs/one curb w/ assistive device and supervision   Goal #4   Current Status    Goal #5 Patient to demonstrate independence with home activity/exercise instructions provided to patient in preparation for discharge.   Goal #5   Current Status    Goal #6    Goal #6  Current Status      Patient Evaluation Complexity Level:  History Moderate - 1 or 2 personal factors and/or co-morbidities   Examination of body systems Moderate - addressing a total of 3 or more elements   Clinical Presentation  Moderate - Evolving   Clinical Decision Making  Moderate Complexity     Gait Trainin minutes

## 2024-07-08 NOTE — OCCUPATIONAL THERAPY NOTE
OCCUPATIONAL THERAPY EVALUATION - INPATIENT     Room Number: 315/315-A  Evaluation Date: 2024  Type of Evaluation: Initial  Presenting Problem: CVA    Physician Order: IP Consult to Occupational Therapy  Reason for Therapy: ADL/IADL Dysfunction and Discharge Planning    OCCUPATIONAL THERAPY ASSESSMENT   Patient is a 69 year old female admitted 2024 for acute CVA; pt reports symptoms of dizziness and vomiting prior to admission but no significant weakness, visual changes, or speech changes.  Prior to admission, patient's baseline is independent and active- she babysits her grandchildren and typically managing all self care, IADL and mobility at Mod I - to I level; pt's daughter does report concern for patient's balance in the past and has encouraged cane use but pt is resistant.  Patient is currently functioning near baseline with self care and functional mbility.  Patient is requiring up to Min A  as a result of the following impairments: balance, activity tolerance, deconditioning. Occupational Therapy will continue to follow for duration of hospitalization.    Patient will benefit from continued skilled OT Services at discharge to promote functional independence and safety with additional support and return home with home health PT- pt is managing self care at baseline    PLAN     OT Device Recommendations: TBD    OCCUPATIONAL THERAPY MEDICAL/SOCIAL HISTORY   Problem List  Principal Problem:    Cerebrovascular accident (CVA), unspecified mechanism (HCC)  Active Problems:    Acute CVA (cerebrovascular accident) (HCC)    HOME SITUATION  Type of Home: House  Home Layout: Able to live on main level  Lives With: -- (spouse, dtr, TARAS)    SUBJECTIVE  Cooperative and agreeable to all activity presented    OCCUPATIONAL THERAPY EXAMINATION    OBJECTIVE  Precautions: Bed/chair alarm  Fall Risk: High fall risk    PAIN ASSESSMENT  Ratin    ACTIVITY TOLERANCE  Pulse: 58        BP: 147/89  BP Location: Left arm  BP  Method: Automatic  Patient Position: Sitting    O2 SATURATIONS  Oxygen Therapy  SPO2% on Room Air at Rest: 97    COGNITION  WNL    VISION  WNL    PERCEPTION  wNL    SENSATION  WNL    Communication: WNL    Behavioral/Emotional/Social: WNL    RANGE OF MOTION   Upper extremity ROM is within functional limits     STRENGTH ASSESSMENT  Upper extremity strength is within functional limits     COORDINATION  Gross Motor: WFL   Fine Motor: WFL     ACTIVITIES OF DAILY LIVING ASSESSMENT  AM-PAC ‘6-Clicks’ Inpatient Daily Activity Short Form  How much help from another person does the patient currently need…  -   Putting on and taking off regular lower body clothing?: A Little  -   Bathing (including washing, rinsing, drying)?: A Little  -   Toileting, which includes using toilet, bedpan or urinal? : A Little  -   Putting on and taking off regular upper body clothing?: A Little  -   Taking care of personal grooming such as brushing teeth?: None  -   Eating meals?: None    AM-PAC Score:  Score: 20  Approx Degree of Impairment: 38.32%  Standardized Score (AM-PAC Scale): 42.03  CMS Modifier (G-Code): CJ    FUNCTIONAL TRANSFER ASSESSMENT  Sit to Stand: Edge of Bed  Edge of Bed: Contact Guard Assist  Toilet Transfer: Contact Guard Assist    BED MOBILITY  Rolling: Stand-by Assist  Supine to Sit : Stand-by Assist  Sit to Supine (OT): Stand-by Assist  Scooting: SBA    BALANCE ASSESSMENT  Static Sitting: Stand-by Assist  Static Standing: Contact Guard Assist    FUNCTIONAL ADL ASSESSMENT  Eating: Independent  Grooming Seated: Stand-by Assist  Bathing Seated: Contact Guard Assist  UB Dressing Seated: Stand-by Assist  LB Dressing Seated: Contact Guard Assist  Toileting Seated: Contact Guard Assist     Skilled Therapy Provided: Activity promotion, pacing, and energy conservation; up to bathroom with assist; managing toileting tasks with CGA for clothing management; ambulatory in room distances with RW; pt tolerating all tasks presented;  stable at exit.     EDUCATION PROVIDED     The patient's Approx Degree of Impairment: 38.32% has been calculated based on documentation in the [facility] '6 clicks' Inpatient Daily Activity Short Form.  Research supports that patients with this level of impairment may benefit from  PT and family support.  Final disposition will be made by interdisciplinary medical team.          OT Goals  Patients self stated goal is: to return home     Patient will complete functional transfer with Mod I   Comment:     Patient will complete toileting with Mod I   Comment:     Patient will tolerate standing for 3-5  minutes in prep for adls with supervision   Comment:    Patient will complete item retrieval with supervision  Comment:          Goals  on:   Frequency: 1-2 more sessions    Patient Evaluation Complexity Level:   Occupational Profile/Medical History LOW - Brief history including review of medical or therapy records    Specific performance deficits impacting engagement in ADL/IADL LOW  1 - 3 performance deficits    Client Assessment/Performance Deficits LOW - No comorbidities nor modifications of tasks    Clinical Decision Making LOW - Analysis of occupational profile, problem-focused assessments, limited treatment options    Overall Complexity LOW     OT Session Time: 23 minutes  Self-Care Home Management: 8 minutes  Therapeutic Activity: 15 minutes    Willy Catalan, Occupational Therapist, OTR/L ext [phone]

## 2024-07-08 NOTE — PHYSICAL THERAPY NOTE
PT orders received, chart reviewed. ECHO at bedside, will follow up, thank you.     Khloe Cervantes, PT

## 2024-07-08 NOTE — CM/SW NOTE
07/08/24 1200   CM/SW Referral Data   Referral Source Physician   Reason for Referral Protocol order set   Specify order set Stroke  (HH Evaluation)   Informant Patient   Medical Hx   Does patient have an established PCP? Yes  (Armando Méndez)   Patient Info   Patient's Current Mental Status at Time of Assessment Alert;Oriented   Patient's Home Environment House   Number of Levels in Home 3  (pt stays on main floor)   Number of Stair in Home 1 to enter   Patient lives with Spouse/Significant other;Daughter;Other  (son in law & granddtr)   Patient Status Prior to Admission   Independent with ADLs and Mobility No   Pt. requires assistance with Ambulating   Services in place prior to admission DME/Supplies at home   Type of DME/Supplies Straight Cane   Discharge Needs   Anticipated D/C needs Home health care   Choice of Post-Acute Provider   Informed patient of right to choose their preferred provider Yes   List of appropriate post-acute services provided to patient/family with quality data Yes   Patient/family choice pending   Information given to Patient   Residential HHC/Hospice financial disclosure given No  (N/A)         SW met w/ pt at bedside and above assessment completed.    Pt denied hx w/ HH services. SW explained some benefits, referral process, and next steps for HH agency choice.    Pt is agreeable to HH. List of quality data provided and explained furhter to pt at bedside. F/up instructions once choice is made also provided.    No questions at this time from pt.      PLAN: Home w/ HH - pending choice & med clear      SW/CM to remain available for support and/or discharge planning.         Kimberly Goff, MSW, LSW [phone]

## 2024-07-08 NOTE — SLP NOTE
ADULT SWALLOWING EVALUATION    ASSESSMENT    ASSESSMENT/OVERALL IMPRESSION:  PPE REQUIRED. THIS SLP WORE GLOVES. HANDS SANITIZED/WASHED UPON ENTRANCE/EXIT.     SLP BSSE orders received and acknowledged. A swallow evaluation warranted per stroke protocol.  The pt was admitted with diagnosis of CVA.  Chart reviewed and collaborated with RN.  Swallowing evaluation approved and RN reports pt is tolerating current diet. Per MD notes, \"Nely Saravia is a(n) 69 year old female who has a pmh of DM2, HTN, Dyslipidemia who was admitted for acute dizziness with nausea and vomiting with vertigo for the past 24 hours. Highest blood sugar reported was 293. He denies fever/chills, LOC, chest pain, sob and dyspnea on exertion.\"  Pt seen at bedside and agreeable to participate in BSSE.  Pt denies any pain.  Pt is alert O x 3, afebrile with clear vocal quality, tolerating room air with oxygen saturation at 97% prior to the start of po trials. No family was present at the time of testing.  Pt with no hx of dysphagia at [facility].     Pt positioned upright to 90 degrees in bed. Oral [facility] examination completed.  Face symmetrical at rest with functional ROM/strength with labial and lingual movements. Assessed pt with po trials of puree, soft/hard solids, and thin liquids via open cup/straw. Pt with functional oral acceptance and bilabial seal across all trials. Oral phase of swallow appears timely with adequate bolus control and propulsion. Pt with intact bite, mastication of solids, and timely A-P transit with solid bolus.  Minimal oral stasis was cleared with a multiple dry swallow.  Pharyngeal phase of the swallow appears timely with adequate hyolaryngeal elevation/excursion.  No CXR completed at this time. Oxygen status remained >97% throughout the entire evaluation. No overt clinical signs of aspiration observed with any consistency (no coughing, no throat clearing, and vocal quality remains dry).  Pt denies any globus sensation post  the swallow.  The pt was left at bedside resting in the bed with call light in reach.    At this time, pt presents with functional oropharyngeal phase of the swallow. Recommend a regular solid diet and thin liquids with adherence to safe swallowing compensatory strategies. Pt reports preferred learning style of education is verbal discussion.  Results and recommendations reviewed with pt and RN. Provided education via verbal discussion. The pt acknowledged understanding of the education and was able to verbalize/demonstrate precautions with minimal cues.  SLP collaborated with RN for diet  orders. Diet recommendations and swallowing precautions/strategies posted on white board at bedside.   FCM SCORE: 7/7       PLAN: Will follow up x 1-2 to ensure safety with diet and educate pt on compensatory strategies/swallow precautions. Video swallow study to be completed if CXR declines, increase in clinical signs of aspiration, and/or MD desires.         RECOMMENDATIONS   Diet Recommendations - Solids: Regular  Diet Recommendations - Liquids: Thin Liquids                        Compensatory Strategies Recommended: Slow rate;Alternate consistencies;Small bites and sips  Aspiration Precautions: Upright position;Slow rate;Small bites and sips  Medication Administration Recommendations: No restrictions  Treatment Plan/Recommendations: Aspiration precautions    HISTORY   MEDICAL HISTORY  Reason for Referral: Stroke protocol;R/O aspiration    Problem List  Principal Problem:    Cerebrovascular accident (CVA), unspecified mechanism (HCC)  Active Problems:    Acute CVA (cerebrovascular accident) (HCC)      Past Medical History  Past Medical History:    Diabetes (HCC)    High blood pressure    Hyperlipidemia       Prior Living Situation: Home with spouse  Diet Prior to Admission: Regular;Thin liquids  Precautions: Aspiration    Patient/Family Goals: to eat    SWALLOWING HISTORY  Current Diet Consistency: Regular;Thin  liquids  Dysphagia History: No history of dysphagia at [facility]  Imaging Results:   MRI  CONCLUSION:      Punctate acute infarct involving the periventricular white matter along the left aspect of the 4th ventricle.      Mild chronic microvascular ischemic changes.      Mild global parenchymal volume loss.      Lesser incidental findings described above.            Dictated by (CST): Kamran Irving MD on 7/07/2024 at 5:56 PM       Finalized by (CST): Kamran Irving MD on 7/07/2024 at 6:02 PM       SUBJECTIVE   Pt alert and O x 3.    OBJECTIVE   ORAL MOTOR EXAMINATION  Dentition: Natural;Functional  Symmetry: Within Functional Limits  Strength: Within Functional Limits  Tone: Within Functional Limits  Range of Motion: Within Functional Limits  Rate of Motion: Within Functional Limits    Voice Quality: Clear  Respiratory Status: Unlabored  Consistencies Trialed: Thin liquids;Puree;Soft solid;Hard solid  Method of Presentation: Self presentation;Spoon;Cup;Straw;Single sips  Patient Positioning: Upright;Midline    Oral Phase of Swallow: Within Functional Limits                      Pharyngeal Phase of Swallow: Within Functional Limits           (Please note: Silent aspiration cannot be evaluated clinically. Videofluoroscopic Swallow Study is required to rule-out silent aspiration.)    Esophageal Phase of Swallow: No complaints consistent with possible esophageal involvement                GOALS  Goal #1 The patient will tolerate regular consistency and thin liquids without overt signs or symptoms of aspiration with 100 % accuracy over 1 session(s).  In Progress   Goal #2 The patient/family/caregiver will demonstrate understanding and implementation of aspiration precautions and swallow strategies independently over 1 session(s).    In Progress   Goal #3 The patient will utilize compensatory strategies as outlined by  BSSE (clinical evaluation) including Slow rate, Small bites, Small sips, Alternate liquids/solids with  minimal assistance 90 % of the time across 1 sessions.  In Progress     FOLLOW UP  Treatment Plan/Recommendations: Aspiration precautions  Number of Visits to Meet Established Goals: 1  Follow Up Needed (Documentation Required): Yes  SLP Follow-up Date: 07/09/24    Thank you for your referral.   If you have any questions, please contact     Socorro Clulen MS/CCC-SLP  Speech Language Pathologist  [facility]  EXT. [phone]

## 2024-07-08 NOTE — CONSULTS
[facility]  [address] [place] [address], [address]  [facility] [id number]  [phone]            Nely Saravia Patient Status:  Inpatient    1955 MRN [id number]   Location [facility] 3W/SW Attending Munira Cruz MD   Hosp Day # 1 PCP Armando Méndez MD     Date of Admission:  2024  Date of Consult:  2024  Reason for Consultation:   Dizziness    History of Present Illness:   Patient is a 69 year old female with history of diabetes, hypertension, hyperlipidemia who was admitted to the hospital for acute onset dizziness.  Patient woke up with symptoms on Saturday morning, started to feel extreme room spinning dizziness, nausea, and vomiting.  Came to ED yesterday and had MRI showing small infarct in the left periaqueductal gray.  Today, patient feels much better, no longer dizzy but still feels little bit off.  Able to take in a little p.o. without vomiting today      Past Medical History  Past Medical History:    Diabetes (HCC)    High blood pressure    Hyperlipidemia       Past Surgical History  Past Surgical History:   Procedure Laterality Date    Hysterectomy         Family History  Family History   Problem Relation Age of Onset    Diabetes Father     Hypertension Father     Hypertension Mother        Social History  Pediatric History   Patient Parents    Not on file     Other Topics Concern    Not on file   Social History Narrative    Not on file           Current Medications:  Current Facility-Administered Medications   Medication Dose Route Frequency    metoprolol succinate ER (Toprol XL) 24 hr tab 25 mg  25 mg Oral Daily Beta Blocker    lisinopril (Prinivil; Zestril) tab 40 mg  40 mg Oral Daily    acetaminophen (Tylenol) tab 650 mg  650 mg Oral Q4H PRN    Or    acetaminophen (Tylenol) rectal suppository 650 mg  650 mg Rectal Q4H PRN    labetalol (Trandate) 5 mg/mL injection 10 mg  10 mg Intravenous Q10 Min PRN    hydrALAzine (Apresoline) 20 mg/mL injection  10 mg  10 mg Intravenous Q2H PRN    metoclopramide (Reglan) 5 mg/mL injection 10 mg  10 mg Intravenous Q8H PRN    aspirin 300 MG rectal suppository 300 mg  300 mg Rectal Daily    Or    aspirin tab 325 mg  325 mg Oral Daily    atorvastatin (Lipitor) tab 80 mg  80 mg Oral Nightly    enoxaparin (Lovenox) 40 MG/0.4ML SUBQ injection 40 mg  40 mg Subcutaneous Daily    acetaminophen (Tylenol) tab 650 mg  650 mg Oral Q4H PRN    Or    HYDROcodone-acetaminophen (Norco) 5-325 MG per tab 1 tablet  1 tablet Oral Q4H PRN    Or    HYDROcodone-acetaminophen (Norco) 5-325 MG per tab 2 tablet  2 tablet Oral Q4H PRN    ondansetron (Zofran) 4 MG/2ML injection 4 mg  4 mg Intravenous Q6H PRN    polyethylene glycol (PEG 3350) (Miralax) 17 g oral packet 17 g  17 g Oral Daily PRN    sennosides (Senokot) tab 17.2 mg  17.2 mg Oral Nightly PRN    bisacodyl (Dulcolax) 10 MG rectal suppository 10 mg  10 mg Rectal Daily PRN    fleet enema (Fleet) 7-19 GM/118ML rectal enema 133 mL  1 enema Rectal Once PRN    glucose (Dex4) 15 GM/59ML oral liquid 15 g  15 g Oral Q15 Min PRN    Or    glucose (Glutose) 40% oral gel 15 g  15 g Oral Q15 Min PRN    Or    glucose-vitamin C (Dex-4) chewable tab 4 tablet  4 tablet Oral Q15 Min PRN    Or    dextrose 50% injection 50 mL  50 mL Intravenous Q15 Min PRN    Or    glucose (Dex4) 15 GM/59ML oral liquid 30 g  30 g Oral Q15 Min PRN    Or    glucose (Glutose) 40% oral gel 30 g  30 g Oral Q15 Min PRN    Or    glucose-vitamin C (Dex-4) chewable tab 8 tablet  8 tablet Oral Q15 Min PRN    insulin aspart (NovoLOG) 100 Units/mL FlexPen 1-5 Units  1-5 Units Subcutaneous TID CC and HS     Medications Prior to Admission   Medication Sig    Insulin NPH & Regular (NOVOLIN 70/30 FLEXPEN RELION) (70-30) 100 UNIT/ML Subcutaneous Suspension Pen-injector Inject 12 Units into the skin in the morning and 12 Units before bedtime.    Meloxicam 7.5 MG Oral Tab Take 1 tablet (7.5 mg total) by mouth daily.    lisinopril 20 MG Oral Tab Take  1 tablet (20 mg total) by mouth daily.    Glucose Blood (ONETOUCH ULTRA) In Vitro Strip To check blood sugars up to twice per day    Blood Glucose Monitoring Suppl (ONETOUCH ULTRA MINI) w/Device Does not apply Kit To use as directed.    atorvastatin 40 MG Oral Tab Take 1 tablet (40 mg total) by mouth every morning.    amLODIPine 10 MG Oral Tab Take 1 tablet (10 mg total) by mouth every morning.    traMADol 50 MG Oral Tab Take 1 tablet (50 mg total) by mouth every 6 (six) hours as needed for Pain. No alcohol or driving on this med. Stop if lethargic or hallucinating.    neomycin-polymyxin-hydrocortisone 3.5-32767-9 Otic Solution Place 3 drops into the right ear 3 (three) times daily.    ondansetron (ZOFRAN) 4 mg tablet Take 1 tablet (4 mg total) by mouth every 8 (eight) hours as needed for Nausea.       Allergies  No Known Allergies    Review of Systems:   As in HPI, the rest of the 14 system review was done and was negative    Physical Exam:     Vitals:    07/08/24 0800 07/08/24 1055 07/08/24 1113 07/08/24 1200   BP: (!) 184/81 147/89 147/89 141/67   Pulse: 58  58 73   Resp: 18   18   Temp: 98.4 °F (36.9 °C)   99 °F (37.2 °C)   TempSrc: Oral   Oral   SpO2: 96%   94%   Weight:       Height:           General: No apparent distress, well nourished, well groomed.    Neurological:     Mental Status:  Alert, conversational, following all commands.  Speech fluent  and Fund of knowledge appropriate for education and age    Cranial Nerves:  II.- Visual fields full to confrontation, III, IV, VI- EOM intact, V. Facial sensation intact, and VII. Face symmetric, no facial weakness    Motor Exam:  Strength- upper extremities 5/5 proximally and distally                  - lower  extremities 5/5 proximally and distally      Sensory Exam:  Light touch- Intact in all 4 limbs and Pinprick- Intact in all 4 limbs    Deep Tendon Reflexes:  Biceps 2+ bilateral symmetric  Triceps 2+ bilateral symmetric  Brachioradialis 2 + bilateral  symmetric  Patellar 1+ bilateral symmetric  Ankle jerks Absent      Coordination:  ?dysmetria RUE with finger to nose, normal on left  No abnormal movements        Results:     Laboratory Data:  Lab Results   Component Value Date    WBC 12.3 (H) 07/08/2024    HGB 13.9 07/08/2024    HCT 39.1 07/08/2024    .0 07/08/2024    CREATSERUM 0.68 07/08/2024    BUN 10 07/08/2024     07/08/2024    K 3.2 (L) 07/08/2024     07/08/2024    CO2 27.0 07/08/2024     (H) 07/08/2024    CA 8.9 07/08/2024    ALB 4.2 07/07/2024    ALKPHO 93 07/07/2024    TP 6.8 07/07/2024    AST 19 07/07/2024    ALT 14 07/07/2024    LIP 27 07/07/2024    MG 1.8 10/07/2021    PHOS 3.0 10/07/2021    TROP <0.045 10/02/2021         Imaging:    CTA BRAIN + CTA CAROTIDS (CPT=70496/42179)    Addendum Date: 7/8/2024    CORRECTION Corrected on: 7/08/2024;   PROCEDURE: CTA BRAIN + CTA CAROTIDS (CPT=70496/83291)  COMPARISON: [facility], MRI BRAIN WO ACUTE (3) SEQUENCE(CPT=70551), 7/07/2024, 4:32 PM.  [facility], CT BRAIN OR HEAD (CPT=70450), 12/17/2023, 4:25 PM.  INDICATIONS: Hypoglycemia  TECHNIQUE:   CT images of the neck and brain were obtained without and non-ionic intravenous contrast material. Multi-planar reformatted/3-D images were created to optimize visualization of vascular anatomy. Automated exposure control for dose reduction was used. Dose information is transmitted to the ACR (American College of Radiology) NRDR (National Radiology Data Registry) which includes the Dose Index Registry.Evaluation of internal carotid stenosis is based on NASCET Criteria.    FINDINGS: CT ANGIOGRAPHY OF THE NECK:  CAROTID ARTERIES: RIGHT COMMON CAROTID: No hemodynamically significant stenosis or dissection. RIGHT INTERNAL CAROTID: No hemodynamically significant stenosis or dissection.  LEFT COMMON CAROTID: No hemodynamically significant stenosis or dissection. LEFT INTERNAL CAROTID: No hemodynamically significant  stenosis or dissection.  VERTEBRAL ARTERIES: RIGHT VERTEBRAL ARTERY: No hemodynamically significant stenosis or dissection. LEFT VERTEBRAL ARTERY: No hemodynamically significant stenosis or dissection.  CT ANGIOGRAPHY OF THE BRAIN:  ANTERIOR CIRCULATION: DISTAL INTERNAL CAROTIDS: Flow identified. No significant stenosis. ANTERIOR CEREBRALS: Flow identified. No significant stenosis. MIDDLE CEREBRALS: Flow identified. No significant stenosis.  POSTERIOR CIRCULATION: RIGHT VERTEBRAL: Flow identified. No significant stenosis. LEFT VERTEBRAL: Flow identified. No significant stenosis. BASILAR: Flow identified. No significant stenosis. POSTERIOR CEREBRALS: Flow identified. No significant stenosis.  ANEURYSMS: None. VASCULAR MALFORMATIONS: None. OTHER: Negative.     AORTIC ARCH (Limited): No aneurysm or dissection is identified in the imaged volume.  MEDIASTINUM/APICES (Limited): No mass or adenopathy.  OTHER:  There is a 0.5 cm area of low attenuation within the left pontine tonsil.  There is multilevel degenerative change in the cervical spine.   CONCLUSION:   No evidence large vessel intracranial arterial occlusion or proximal flow limiting stenosis.  No evidence of occlusion, hemodynamically significant stenosis or evidence to suggest dissection of the bilateral cervical carotid and vertebral arteries.  Subcentimeter area of low attenuation left palatine tonsil which could reflect a small tonsillar cyst.  Please correlate clinically for any signs of infection as a small abscess is in the differential.  A tonsillar lesion may be less likely but not entirely excluded and follow up nonemergent ENT evaluation also advised.    Dictated by (CST): Micheal Galeano MD on 7/08/2024 at 11:07 AM     Finalized by (CST): Micheal Galeano MD on 7/08/2024 at 11:23 AM    Dictated by (CST): Micheal Galeano MD on 7/08/2024 at 11:40 AM     Finalized by (CST): Micheal Galeano MD on 7/08/2024 at 11:40 AM              Result Date:  7/8/2024  CONCLUSION:   No evidence large vessel intracranial arterial occlusion or proximal flow limiting stenosis.  No evidence of occlusion, hemodynamically significant stenosis or evidence to suggest dissection of the bilateral cervical carotid and vertebral arteries.  Subcentimeter area of low attenuation left palatine tonsil which could reflect a small tonsillar cyst.  Please correlate clinically for any signs of infection is a small abscess is in the differential.  Underlying tonsil lesion may be less likely but not  entirely excluded follow up nonemergent anti evaluation also advised.    Dictated by (CST): Micheal Galeano MD on 7/08/2024 at 11:07 AM     Finalized by (CST): Micheal Galeano MD on 7/08/2024 at 11:23 AM          MRI BRAIN WO ACUTE (3) SEQUENCE (CPT=70551)    Result Date: 7/7/2024  CONCLUSION:   Punctate acute infarct involving the periventricular white matter along the left aspect of the 4th ventricle.  Mild chronic microvascular ischemic changes.  Mild global parenchymal volume loss.  Lesser incidental findings described above.    Dictated by (CST): Kamran Irving MD on 7/07/2024 at 5:56 PM     Finalized by (CST): Kamran Irving MD on 7/07/2024 at 6:02 PM         EKG    Result Date: 7/8/2024  Normal sinus rhythm Moderate voltage criteria for LVH, may be normal variant ( R in aVL , [place] product ) Septal infarct , age undetermined Abnormal ECG No previous ECGs found in [facility] Confirmed by VINITA LOPEZ, BERNARD ([id number]) on 7/8/2024 8:29:34 AM       Impression:     Assessment   Nely Saravia is a 69 year old female, who presents for acute onset vertigo and vomiting.  Found to have acute infarct just adjacent to the fourth ventricle on the left.  This location would explain her symptoms.  Size and location of infarct are suggestive of a lacunar stroke related to uncontrolled diabetes, hypertension, hyperlipidemia.     1. Cerebrovascular accident (CVA), unspecified mechanism (HCC)  -Likely small  vessel in origin  -CT angio head and neck showed no LVO  -TTE was also normal  -Will treat with aspirin 81mg + Plavix for 3 weeks, then stop Plavix and continue aspirin monotherapy thereafter  -LDL elevated, continue atorvastatin 80  -A1c elevated, and work on aggressive glycemic control  -PT, OT and speech therapy   -Can start treating blood pressure with no restriction, goal normotension.  Patient is now greater than 24 hours after stroke onset.    -patient was not a thrombolysis candidate because she was out of the window   -patient did not receive thrombectomy because no LVO found       STROKE RISK FACTORS:   *Hypertension - Target blood pressure <140/90, <130/85 for high risk, normal 120/80  *Physical inactivity - target exercise at least 3 times per week  *Obesity - target ideal body weight and girth <35\" for women, <40\" for men  *Diabetes - Target <6.5-7%   *Smoking - Target smoking cessation  *Hyperlipidemia - Target total cholesterol < 200, Target LDL <100, < 70 for high risk, Target HDL >45 for men, >55 for women, Target triglycerides <150         Thank you for allowing me to participate in the care of your patient.    Genna Pizarro MD  7/8/2024

## 2024-07-08 NOTE — CM/SW NOTE
10:05AM  Received MDO for HH Evaluation per stroke w/up protocol.    Per RN rounds, pt is from home w/ her . Pt is AOX4, on RA w/ st able O2 sats, and ambulates independently at baseline.    Per RN Jessica, pt continues to ambulate independently in her room. There are no residuals/deficits at this time.    PT/OT evals pending per protocol.    11:10AM  Per PT Khloe - Anticipated therapy need: Home with Home Healthcare    SW sent HH referrals in Aidin. F2F entered/sent. SW to f/up w/ pt later today for further assessment and provide HH list for choice.      PLAN: Home w/ poss HH - pending list/choice & med clear       SW/CM to remain available for support and/or discharge planning.         Kimberly Goff, MSW, LSW [phone]

## 2024-07-08 NOTE — DIETARY NOTE
ADULT NUTRITION INITIAL ASSESSMENT    Pt is at moderate nutrition risk.  Pt does not meet malnutrition criteria.      RECOMMENDATIONS TO MD: See Nutrition Intervention    ADMITTING DIAGNOSIS:  Cerebrovascular accident (CVA), unspecified mechanism (HCC) [I63.9]  PERTINENT PAST MEDICAL HISTORY:   Past Medical History:    Diabetes (HCC)    High blood pressure    Hyperlipidemia       PATIENT STATUS: Initial 07/08/24: Pt admit for CVA. PMH sig for DM, HLD, high blood pressure. Pt assessed due to screening at risk for MST of 4 for weight loss. EMR wt hx review reveals pt has been weight stable since Dec 2023 with wts ranging ~154-160# until now. Long term weight loss noted since 2022. Past RD notes reviewed from last few years noting pt with h/o gastroparesis diet education and previous h/o unintentional weight loss. Pt visited at bedside. Pt states she has lost 60# since Dec 2023. Unsure if this is accurate as it does not match wt hx recorded in EMR. Pt states she has not bee able to keep anything down for the last 2 days d/t n/v. But prior to that her PO intake has bee normal at home with no recent decline. Consistent with findings of past RD note, uncontrolled diabetes may play a role in progressive/rapid weight loss. Pt's Hgb A1c if 14.7% taken on 7/7/24. Pt is aware she needs to take better care to monitor and manage her sugars/carbs. Discussed this briefly. Has had DM diet education in the past. Pt declined offer of ONS/snacks at this time. She feels that she is able to order he own meals and snacks on her own.     FOOD/NUTRITION RELATED HISTORY:  Appetite:  poor for a few days PTA, but good appetite prior to that  Intake:  No meal intakes recorded yet.  Intake Meeting Needs:  unsure, no meals so far.  Percent Meals Eaten (last 6 days)       None            Food Allergies: No Known Food Allergies (NKFA)  Cultural/Ethnic/Episcopal Preferences: None    GASTROINTESTINAL: +BM 7/7; pt states n/v is mostly  resolved.    MEDICATIONS: reviewed   metoprolol succinate ER  25 mg Oral Daily Beta Blocker    lisinopril  40 mg Oral Daily    aspirin  300 mg Rectal Daily    Or    aspirin  325 mg Oral Daily    atorvastatin  80 mg Oral Nightly    enoxaparin  40 mg Subcutaneous Daily    insulin aspart  1-5 Units Subcutaneous TID CC and HS     LABS: reviewed; hypokalemia- riders given today   Recent Labs     07/07/24  1458 07/08/24  0558   * 208*   BUN 9 10   CREATSERUM 0.70 0.68   CA 9.3 8.9    143   K 3.5 3.2*    110   CO2 25.0 27.0   OSMOCALC 296* 301*     NUTRITION RELATED PHYSICAL FINDINGS:  - Nutrition Focused Physical Exam (NFPE): well nourished, no significant wasting noted  - Fluid Accumulation: none  ---> See RN documentation for details  - Skin Integrity: intact ---> See RN documentation for details    ANTHROPOMETRICS:  HT: 165.1 cm (5' 5\")  WT: 72.4 kg (159 lb 11.2 oz)   BMI: Body mass index is 26.17 kg/m².  BMI CLASSIFICATION: 25-29.9 kg/m2 - overweight  IBW: ~125 lbs        127% IBW  Usual Body Wt: ~180's# in the past per EMR       ~88% UBW    WEIGHT HISTORY:  Patient Weight(s) for the past 336 hrs:   Weight   07/07/24 2045 72.4 kg (159 lb 11.2 oz)   07/07/24 1405 68 kg (150 lb)     Wt Readings from Last 10 Encounters:   07/07/24 72.4 kg (159 lb 11.2 oz)   07/07/24 68 kg (149 lb 14.6 oz)   03/16/24 75.8 kg (167 lb)   01/17/24 69.9 kg (154 lb)   12/17/23 72.6 kg (160 lb)   09/09/22 88.9 kg (196 lb)   06/22/22 92.5 kg (204 lb)   03/02/22 89.4 kg (197 lb)   12/01/21 85.7 kg (189 lb)   11/02/21 87.5 kg (193 lb)     NUTRITION DIAGNOSIS/PROBLEM:   Unintended wt loss related to Physiological causes increasing nutrient needs in the setting of uncontrolled DM as evidenced by pt report of 60# weight loss x last 7 months.     NUTRITION INTERVENTION:     NUTRITION PRESCRIPTION:   Estimated Nutrition needs: --dosing wt of 72.4 kg - wt taken on 7/7  Calories: 7389-1763 calories/day (20-25 calories per kg Dosing  wt)  Protein: 72-86 g protein/day (1-1.2 g protein/kg Dosing wt)    - Diet:       Procedures    Carbohydrate controlled diet 1800 kcal/60 grams; Sodium Restriction: 2 GM NA; Is Patient on Accuchecks? Yes      - RD Care Plan: Encouraged small frequent meals with emphasis on high calorie/high protein and Monitor need for ONS (oral nutritional supplements)  - Meals and snacks:  Pt declined at this time.  - Medical Food Supplements- Patient declined. Rational/use of oral supplements discussed.  - Vitamin and mineral supplements: none  - Feeding assistance: independent  - Nutrition education: Discussed importance of adequate energy and protein intake and properly managing her diabetes.     - Coordination of nutrition care: collaboration with other providers  - Discharge and transfer of nutrition care to new setting or provider: monitor plans    MONITOR AND EVALUATE/NUTRITION GOALS:  - Food and Nutrient Intake:      Monitor: adequacy of PO intake  - Food and Nutrient Administration:      Monitor: N/A  - Anthropometric Measurement:    Monitor weight  - Nutrition Goals:      halt wt loss, maintain wt within 5%, PO greater than 75% of meals, labs within acceptable limits, and euglycemia    DIETITIAN FOLLOW UP: RD to follow and monitor nutrition status       Lucretia Kidd RD, LDN  Clinical Dietitian  P: [phone]

## 2024-07-08 NOTE — SLP NOTE
SPEECH/LANGUAGE/COGNITIVE EVALUATION - INPATIENT    Admission Date: 7/7/2024  Evaluation Date: 07/08/24    Reason for Referral: Stroke protocol    ASSESSMENT & PLAN   ASSESSMENT & IMPRESSION  Patient seen for cognitive-communication evaluation per stroke protocol. The pt was admitted with diagnosis of CVA.  Chart reviewed and collaborated with RN.  Speech language cognitive evaluation approved. Per MD notes, \"Nely Saravia is a(n) 69 year old female who has a pmh of DM2, HTN, Dyslipidemia who was admitted for acute dizziness with nausea and vomiting with vertigo for the past 24 hours. Highest blood sugar reported was 293. She denies fever/chills, LOC, chest pain, sob and dyspnea on exertion.\"  Pt seen at bedside and agreeable to participate in SLE.  Pt denies any pain.  Pt is alert O x 3, afebrile with clear vocal quality, tolerating room air with oxygen saturation at 97% prior to the start of po trials. No family was present at the time of testing.  Pt with no hx of speech or cognitive deficits at [facility].  Pt received alert and sitting upright in bed. MRI brain imaging remarkable as below. Patient denies acute changes in cognition, communication at this time; endorses previous decline in memory which he attributes to her age.     Oral motor mechanism examination grossly unremarkable. Speech intelligibility 100% for conversation. Speech is fluent without evidence of word finding deficits in conversation. Patient responds appropriately and efficiently to 2-3 step commands and Y/N questions of moderate complexity. Patient achieved SLUMS score of 28/30 with deficits noted for recall of paragraph information.  When provided a cue of a choice between 2 answers the pt is able to pick the correct answer.      In summary, patient presents with normal cognitive-communication skills.   Pt demonstrated functional attention to task and completing executive functions. Motor speech, expressive language and receptive language  appear to be intact. Deficit with difficulty recalling 1 out of 4 questions from the paragraph story.  Pt reports, \"I wouldn't of remembered that before. 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 At this time, patient appears appropriate for speech-language-cognition skill with her test results falling in \"normal\" classification scale.  Plan and recommendations reviewed with patient and RN at bedside.      [facility] Mental Status (UMS) Examination. The results were as follows:     Orientation and Attention (out of 3): 3  Memory (out of 5-no points given in this item): 5  Calculation and Registration (out of 3): 3  Generative Naming with time constraint (out of 3): 3  Delayed Recall with interference (out of 5): 5  Registration and Digit Span/Reverse Number Recall (out of 2): 2  Clock Drawing (out of 4): 4  Visuospatial Skills/Figure Identification (out of 2): 2  Auditory Comprehension/Story Recall with Executive Function (out of 8): 6     Composite: 28/30  (Normal: 27-30/30, Mild Neurocognitive Disorder: 21-26/30, Dementia: 1-20/30)     Assessment(s) Administered: SLUMS  Deficits Identified: Memory (Pt reports memory skills are at baseline.)    Assessment(s) Administered: SLUMS                   Deficits Identified: Memory (Pt reports memory skills are at baseline.)    Patient Experiencing Pain: No        Prior Living Situation: Home with spouse  Prior Level of Function: Independent      Imaging Results:   MRI  CONCLUSION:      Punctate acute infarct involving the periventricular white matter along the left aspect of the 4th ventricle.      Mild chronic microvascular ischemic changes.      Mild global parenchymal volume loss.      Lesser incidental findings described above.      Dictated by (CST): Kamran Irving MD on 7/07/2024 at 5:56 PM       Finalized by (CST): Kamran Irving MD on 7/07/2024 at  6:02 PM       Patient/Family Goals: to go home    Interdisciplinary Communication: Discussed with RN  Plan posted at bedside    Patient has been informed and has taken part in this evaluation and plan of treatment and have been advised and agree on the findings and goals.      FOLLOW UP  Treatment Plan/Recommendations: No further cognitive communicative therapy is required at this time.  Number of Visits to Meet Established Goals: 0  Follow Up Needed (Documentation Required): No cognitive communication tx  speech to F/U x 1 as needed for swallowing therapy.  SLP Follow-up Date: 07/09/24    Thank you for your referral.  If you have any questions please contact     Socorro Cullen MS/CCC-SLP  Speech Language Pathologist  [facility]  EXT. [phone]

## 2024-07-08 NOTE — OCCUPATIONAL THERAPY NOTE
OT orders rcvd; pt is in bedside testing and not available. Will re-attempt later, schedule permitting.     Willy Catalan, Occupational Therapist, OTR/L ext [phone]

## 2024-07-08 NOTE — H&P
[facility]  part of [facility]    History & Physical    Nely Saravia Patient Status:  Emergency    1955 MRN [id number]   Location [facility] EMERGENCY DEPARTMENT Attending Shawna Li MD   Hosp Day # 0 PCP Armando Méndez MD     Date:  2024  Date of Admission:  2024    History provided by:Patient and medical records   Chief Complaint:     Chief Complaint   Patient presents with    Hyperglycemia       HPI:   Nely Saravia is a(n) 69 year old female who has a pmh of DM2, HTN, Dyslipidemia who was admitted for acute dizziness with nausea and vomiting with vertigo for the past 24 hours. Highest blood sugar reported was 293. He denies fever/chills, LOC, chest pain, sob and dyspnea on exertion. Vitals in the ER were /85, HR 76. MRI of the brain revealed a punctate acute infarct along the left aspect of the 4th ventricle. Patient will be admitted to the cardiac floor for further treatment and workup. Stroke order set has been completed, and Neurology has been consulted.     History     Past Medical History:    Diabetes (HCC)    High blood pressure    Hyperlipidemia     Past Surgical History:   Procedure Laterality Date    Hysterectomy       Family History   Problem Relation Age of Onset    Diabetes Father     Hypertension Father     Hypertension Mother      Social History:  Social History     Socioeconomic History    Marital status:    Tobacco Use    Smoking status: Never    Smokeless tobacco: Never   Vaping Use    Vaping status: Never Used   Substance and Sexual Activity    Alcohol use: Never    Drug use: Never     Allergies/Medications:   Allergies: No Known Allergies  (Not in a hospital admission)      Review of Systems:   Constitutional: negative  Eyes: negative  Ears, nose, mouth, throat, and face: negative  Respiratory: negative  Cardiovascular: negative  Gastrointestinal: negative  Genitourinary:negative  Musculoskeletal:negative  Neurological:  dizziness and vertigo   Behavioral/Psych: negative  Endocrine: negative    Physical Exam:   Vital Signs:  Blood pressure 158/85, pulse 76, temperature 97.6 °F (36.4 °C), temperature source Temporal, resp. rate 16, height 5' 5\" (1.651 m), weight 150 lb (68 kg), SpO2 98%, not currently breastfeeding.     GENERAL:  The patient appeared to be in no distress.  Lying in bed, comfortably.  SKIN:  Warm and hydrated  PSYCHIATRIC: Calm and cooperative   HEENT:  Head was atraumatic and normocephalic.  Eyes:  Extraocular muscles were intact.  Sclera was anicteric.  Pupils were equally reactive to light.  Ears:  There were no lesions.  Nose:  No lesions were noted.  Throat:  There was no thrush, no exudate, no erythema.  There was no evidence of gum bleeding.  NECK:  Supple.  There was no JVD.   CHEST:  Symmetrical movement on inspiration  HEART:  S1 and S2 heard.  RRR   LUNGS:  Air entry was good.  No crackles or wheezes   ABDOMEN: Soft and non-tender.  Bowel sounds were present.  MUSCULOSKELETAL:  There was no deformity.  There was full range of motion in all the extremities.   EXTREMITIES: There was no edema, clubbing or cyanosis  NEUROLOGICAL:  There was no focal deficit.  Cranial nerves II through XII were intact.      Results:     Lab Results   Component Value Date    WBC 15.6 (H) 07/07/2024    HGB 15.2 07/07/2024    HCT 43.3 07/07/2024    .0 07/07/2024    CREATSERUM 0.70 07/07/2024    BUN 9 07/07/2024     07/07/2024    K 3.5 07/07/2024     07/07/2024    CO2 25.0 07/07/2024     (H) 07/07/2024    CA 9.3 07/07/2024    ALB 4.2 07/07/2024    ALKPHO 93 07/07/2024    BILT 0.7 07/07/2024    TP 6.8 07/07/2024    AST 19 07/07/2024    ALT 14 07/07/2024    LIP 27 07/07/2024    MG 1.8 10/07/2021    PHOS 3.0 10/07/2021    TROP <0.045 10/02/2021       Recent Labs   Lab 07/07/24  1458   RBC 4.88   HGB 15.2   HCT 43.3   MCV 88.7   MCH 31.1   MCHC 35.1   RDW 12.4   NEPRELIM 12.05*   WBC 15.6*   .0        Recent Labs   Lab 07/07/24  1458   *   BUN 9   CREATSERUM 0.70   CA 9.3   ALB 4.2      K 3.5      CO2 25.0   ALKPHO 93   AST 19   ALT 14   BILT 0.7   TP 6.8       MRI BRAIN WO ACUTE (3) SEQUENCE (CPT=70551)    Result Date: 7/7/2024  CONCLUSION:   Punctate acute infarct involving the periventricular white matter along the left aspect of the 4th ventricle.  Mild chronic microvascular ischemic changes.  Mild global parenchymal volume loss.  Lesser incidental findings described above.    Dictated by (CST): Kamran Irving MD on 7/07/2024 at 5:56 PM     Finalized by (CST): Kamran Irving MD on 7/07/2024 at 6:02 PM         EKG    Result Date: 7/7/2024  Normal sinus rhythm Moderate voltage criteria for LVH, may be normal variant ( R in aVL , [place] product ) Septal infarct , age undetermined Abnormal ECG No previous ECGs found in [facility]     Assessment/Plan:     Cerebrovascular accident (CVA), unspecified mechanism (HCC)  - Neurology on board  - Allow permissive HTN  - Blood thinners and statin therapy per Neurology  - stroke order set completed  - MRI of the brain reviewed  - PT/OT eval    DM2  - SSI low dose  - accuchecks AC and HS    HTN  - allow permissive HTN    Dyslipidemia  - continue statin     DVT proph- heparin    Full code    MDM High. Time spent on chart/note review, review of labs/imaging, discussion with patient, physical exam, discussion with staff, consultants, coordinating care, and writing admission note.           JENSEN KELLY MD  7/7/2024

## 2024-07-09 VITALS
HEIGHT: 65 IN | OXYGEN SATURATION: 95 % | RESPIRATION RATE: 16 BRPM | WEIGHT: 144.88 LBS | SYSTOLIC BLOOD PRESSURE: 133 MMHG | TEMPERATURE: 99 F | DIASTOLIC BLOOD PRESSURE: 66 MMHG | BODY MASS INDEX: 24.14 KG/M2 | HEART RATE: 69 BPM

## 2024-07-09 LAB
ANION GAP SERPL CALC-SCNC: 6 MMOL/L (ref 0–18)
BUN BLD-MCNC: 8 MG/DL (ref 9–23)
BUN/CREAT SERPL: 10.4 (ref 10–20)
CALCIUM BLD-MCNC: 9.1 MG/DL (ref 8.7–10.4)
CHLORIDE SERPL-SCNC: 108 MMOL/L (ref 98–112)
CO2 SERPL-SCNC: 26 MMOL/L (ref 21–32)
CREAT BLD-MCNC: 0.77 MG/DL
DEPRECATED RDW RBC AUTO: 40.7 FL (ref 35.1–46.3)
EGFRCR SERPLBLD CKD-EPI 2021: 83 ML/MIN/1.73M2 (ref 60–?)
ERYTHROCYTE [DISTWIDTH] IN BLOOD BY AUTOMATED COUNT: 12.5 % (ref 11–15)
GLUCOSE BLD-MCNC: 258 MG/DL (ref 70–99)
GLUCOSE BLDC GLUCOMTR-MCNC: 238 MG/DL (ref 70–99)
GLUCOSE BLDC GLUCOMTR-MCNC: 296 MG/DL (ref 70–99)
HCT VFR BLD AUTO: 41.7 %
HGB BLD-MCNC: 14.5 G/DL
MCH RBC QN AUTO: 31 PG (ref 26–34)
MCHC RBC AUTO-ENTMCNC: 34.8 G/DL (ref 31–37)
MCV RBC AUTO: 89.3 FL
OSMOLALITY SERPL CALC.SUM OF ELEC: 297 MOSM/KG (ref 275–295)
PLATELET # BLD AUTO: 277 10(3)UL (ref 150–450)
POTASSIUM SERPL-SCNC: 4.2 MMOL/L (ref 3.5–5.1)
RBC # BLD AUTO: 4.67 X10(6)UL
SODIUM SERPL-SCNC: 140 MMOL/L (ref 136–145)
WBC # BLD AUTO: 10.5 X10(3) UL (ref 4–11)

## 2024-07-09 PROCEDURE — 99239 HOSP IP/OBS DSCHRG MGMT >30: CPT | Performed by: HOSPITALIST

## 2024-07-09 RX ORDER — LISINOPRIL 40 MG/1
20 TABLET ORAL DAILY
Qty: 30 TABLET | Refills: 1 | Status: SHIPPED | OUTPATIENT
Start: 2024-07-09

## 2024-07-09 RX ORDER — ATORVASTATIN CALCIUM 40 MG/1
80 TABLET, FILM COATED ORAL EVERY MORNING
Qty: 90 TABLET | Refills: 3 | Status: SHIPPED | OUTPATIENT
Start: 2024-07-09

## 2024-07-09 RX ORDER — ASPIRIN 81 MG/1
81 TABLET ORAL DAILY
Qty: 30 TABLET | Refills: 1 | Status: SHIPPED | OUTPATIENT
Start: 2024-07-10

## 2024-07-09 RX ORDER — METOPROLOL SUCCINATE 25 MG/1
25 TABLET, EXTENDED RELEASE ORAL
Qty: 30 TABLET | Refills: 1 | Status: SHIPPED | OUTPATIENT
Start: 2024-07-10

## 2024-07-09 RX ORDER — CLOPIDOGREL BISULFATE 75 MG/1
75 TABLET ORAL DAILY
Qty: 21 TABLET | Refills: 0 | Status: SHIPPED | OUTPATIENT
Start: 2024-07-10 | End: 2024-07-31

## 2024-07-09 NOTE — DISCHARGE INSTRUCTIONS
Home Health:  Sometimes managing your health at home requires assistance.  The [facility] team has recognized your preference to use [employer]  A representative from the home health agency will contact you or your family to schedule your first visit.      [employer].  [address] [place] [id number]  Phone: [phone]  Fax: [phone]    Diabetes:  Your A1C level is greater than 8%.  It is recommended that you attend an outpatient diabetes education program.  Please discuss with your Primary Care Provider at your next visit to obtain a referral.  If you wish to make an appointment at [facility], call [phone].

## 2024-07-09 NOTE — PROGRESS NOTES
[facility]  part of [facility]    Progress Note    Nely Saravia Patient Status:  Inpatient    1955 MRN [id number]   Location [facility] 3W/SW Attending Munira Cruz MD   Hosp Day # 1 PCP Armando Méndez MD       Subjective:   Nely Saravia is  feeling ok. Afebrile. No chest pain or sob.     Objective:   Blood pressure 122/81, pulse 72, temperature 98.1 °F (36.7 °C), temperature source Oral, resp. rate 20, height 5' 5\" (1.651 m), weight 159 lb 11.2 oz (72.4 kg), SpO2 95%, not currently breastfeeding.    Physical Exam:    General: No acute distress.   Respiratory: Clear to auscultation bilaterally. No wheezes. No rhonchi.  Cardiovascular: S1, S2. Regular rate and rhythm. No murmurs, rubs or gallops.   Abdomen: Soft, nontender, nondistended.  Positive bowel sounds. No rebound or guarding.  Neurologic: No focal neurological deficits.   Musculoskeletal: Moves all extremities.  Extremities: No edema.    Results:     Lab Results   Component Value Date    WBC 12.3 (H) 2024    HGB 13.9 2024    HCT 39.1 2024    .0 2024    CREATSERUM 0.68 2024    BUN 10 2024     2024    K 4.0 2024     2024    CO2 27.0 2024     (H) 2024    CA 8.9 2024    ALB 4.2 2024    ALKPHO 93 2024    BILT 0.7 2024    TP 6.8 2024    AST 19 2024    ALT 14 2024    LIP 27 2024    MG 1.8 10/07/2021    PHOS 3.0 10/07/2021    TROP <0.045 10/02/2021       CTA BRAIN + CTA CAROTIDS (CPT=70496/05599)    Addendum Date: 2024    CORRECTION Corrected on: 2024;   PROCEDURE: CTA BRAIN + CTA CAROTIDS (CPT=70496/33501)  COMPARISON: [facility], MRI BRAIN WO ACUTE (3) SEQUENCE(CPT=70551), 2024, 4:32 PM.  [facility], CT BRAIN OR HEAD (CPT=70450), 2023, 4:25 PM.  INDICATIONS: Hypoglycemia  TECHNIQUE:   CT images of the neck and brain were obtained  without and non-ionic intravenous contrast material. Multi-planar reformatted/3-D images were created to optimize visualization of vascular anatomy. Automated exposure control for dose reduction was used. Dose information is transmitted to the ACR (American College of Radiology) NRDR (National Radiology Data Registry) which includes the Dose Index Registry.Evaluation of internal carotid stenosis is based on NASCET Criteria.    FINDINGS: CT ANGIOGRAPHY OF THE NECK:  CAROTID ARTERIES: RIGHT COMMON CAROTID: No hemodynamically significant stenosis or dissection. RIGHT INTERNAL CAROTID: No hemodynamically significant stenosis or dissection.  LEFT COMMON CAROTID: No hemodynamically significant stenosis or dissection. LEFT INTERNAL CAROTID: No hemodynamically significant stenosis or dissection.  VERTEBRAL ARTERIES: RIGHT VERTEBRAL ARTERY: No hemodynamically significant stenosis or dissection. LEFT VERTEBRAL ARTERY: No hemodynamically significant stenosis or dissection.  CT ANGIOGRAPHY OF THE BRAIN:  ANTERIOR CIRCULATION: DISTAL INTERNAL CAROTIDS: Flow identified. No significant stenosis. ANTERIOR CEREBRALS: Flow identified. No significant stenosis. MIDDLE CEREBRALS: Flow identified. No significant stenosis.  POSTERIOR CIRCULATION: RIGHT VERTEBRAL: Flow identified. No significant stenosis. LEFT VERTEBRAL: Flow identified. No significant stenosis. BASILAR: Flow identified. No significant stenosis. POSTERIOR CEREBRALS: Flow identified. No significant stenosis.  ANEURYSMS: None. VASCULAR MALFORMATIONS: None. OTHER: Negative.     AORTIC ARCH (Limited): No aneurysm or dissection is identified in the imaged volume.  MEDIASTINUM/APICES (Limited): No mass or adenopathy.  OTHER:  There is a 0.5 cm area of low attenuation within the left pontine tonsil.  There is multilevel degenerative change in the cervical spine.   CONCLUSION:   No evidence large vessel intracranial arterial occlusion or proximal flow limiting stenosis.  No  evidence of occlusion, hemodynamically significant stenosis or evidence to suggest dissection of the bilateral cervical carotid and vertebral arteries.  Subcentimeter area of low attenuation left palatine tonsil which could reflect a small tonsillar cyst.  Please correlate clinically for any signs of infection as a small abscess is in the differential.  A tonsillar lesion may be less likely but not entirely excluded and follow up nonemergent ENT evaluation also advised.    Dictated by (CST): Micheal Galeano MD on 7/08/2024 at 11:07 AM     Finalized by (CST): Micheal Galeano MD on 7/08/2024 at 11:23 AM    Dictated by (CST): Micheal Galeano MD on 7/08/2024 at 11:40 AM     Finalized by (CST): Micheal Galeano MD on 7/08/2024 at 11:40 AM              Result Date: 7/8/2024  CONCLUSION:   No evidence large vessel intracranial arterial occlusion or proximal flow limiting stenosis.  No evidence of occlusion, hemodynamically significant stenosis or evidence to suggest dissection of the bilateral cervical carotid and vertebral arteries.  Subcentimeter area of low attenuation left palatine tonsil which could reflect a small tonsillar cyst.  Please correlate clinically for any signs of infection is a small abscess is in the differential.  Underlying tonsil lesion may be less likely but not  entirely excluded follow up nonemergent anti evaluation also advised.    Dictated by (CST): Micheal Galeano MD on 7/08/2024 at 11:07 AM     Finalized by (CST): Micheal Galeano MD on 7/08/2024 at 11:23 AM          MRI BRAIN WO ACUTE (3) SEQUENCE (CPT=70551)    Result Date: 7/7/2024  CONCLUSION:   Punctate acute infarct involving the periventricular white matter along the left aspect of the 4th ventricle.  Mild chronic microvascular ischemic changes.  Mild global parenchymal volume loss.  Lesser incidental findings described above.    Dictated by (CST): Kamran Irving MD on 7/07/2024 at 5:56 PM     Finalized by (CST): Aristides  MD Kamran on 7/07/2024 at 6:02 PM         EKG    Result Date: 7/8/2024  Normal sinus rhythm Moderate voltage criteria for LVH, may be normal variant ( R in aVL , [place] product ) Septal infarct , age undetermined Abnormal ECG No previous ECGs found in [facility] Confirmed by VINITA LOPEZ CASH ([id number]) on 7/8/2024 8:29:34 AM     Assessment and Plan:   Cerebrovascular accident (CVA), unspecified mechanism (HCC)  - Neurology on board  - Allow permissive HTN  - Blood thinners and statin therapy per Neurology  - stroke order set completed  - MRI of the brain reviewed  - PT/OT eval  - ultrasound of carotids pending  - can treat BP now      DM2  - SSI low dose  - accuchecks AC and HS     HTN  - will add BP meds  - discussed with nursing     Dyslipidemia  - continue statin      DVT proph- heparin     Full code    MDM High. Time spent on chart/note review, review of labs/imaging, discussion with patient, physical exam, discussion with staff, consultants, coordinating care, writing progress note, and discussion of plan of care.       JENSEN KELLY MD  07/08/24

## 2024-07-09 NOTE — PAYOR COMM NOTE
--------------  CONTINUED STAY REVIEW    Payor: [employer]  Subscriber #:  [id number]  Authorization Number: [id number]    Admit date: 7/7/24  Admit time:  8:40 PM    REVIEW DOCUMENTATION:    [id number]     MRI BRAIN     CONCLUSION:      Punctate acute infarct involving the periventricular white matter along the left aspect of the 4th ventricle.      Mild chronic microvascular ischemic changes.      Mild global parenchymal volume loss.       NEUROLOGY CONSULT   7-8-24     Date of Admission:  7/7/2024  Date of Consult:  7/8/2024  Reason for Consultation:   Dizziness     History of Present Illness:   Patient is a 69 year old female with history of diabetes, hypertension, hyperlipidemia who was admitted to the hospital for acute onset dizziness.  Patient woke up with symptoms on Saturday morning, started to feel extreme room spinning dizziness, nausea, and vomiting.  Came to ED yesterday and had MRI showing small infarct in the left periaqueductal gray.  Today, patient feels much better, no longer dizzy but still feels little bit off.  Able to take in a little p.o. without vomiting today       Neurological:      Mental Status:  Alert, conversational, following all commands.  Speech fluent  and Fund of knowledge appropriate for education and age     Cranial Nerves:  II.- Visual fields full to confrontation, III, IV, VI- EOM intact, V. Facial sensation intact, and VII. Face symmetric, no facial weakness     Motor Exam:  Strength- upper extremities 5/5 proximally and distally                  - lower  extremities 5/5 proximally and distally        Sensory Exam:  Light touch- Intact in all 4 limbs and Pinprick- Intact in all 4 limbs     Deep Tendon Reflexes:  Biceps 2+ bilateral symmetric  Triceps 2+ bilateral symmetric  Brachioradialis 2 + bilateral symmetric  Patellar 1+ bilateral symmetric  Ankle jerks Absent        Coordination:  ?dysmetria RUE with finger to nose, normal on left  No abnormal movements            Results:      Laboratory Data:        Lab Results   Component Value Date     WBC 12.3 (H) 07/08/2024     HGB 13.9 07/08/2024     HCT 39.1 07/08/2024     .0 07/08/2024     CREATSERUM 0.68 07/08/2024     BUN 10 07/08/2024      07/08/2024     K 3.2 (L) 07/08/2024      07/08/2024     CO2 27.0 07/08/2024      (H) 07/08/2024     CA 8.9 07/08/2024       Impression:         Assessment  Nely Saravia is a 69 year old female, who presents for acute onset vertigo and vomiting.  Found to have acute infarct just adjacent to the fourth ventricle on the left.  This location would explain her symptoms.  Size and location of infarct are suggestive of a lacunar stroke related to uncontrolled diabetes, hypertension, hyperlipidemia.      1. Cerebrovascular accident (CVA), unspecified mechanism (HCC)  -Likely small vessel in origin  -CT angio head and neck showed no LVO  -TTE was also normal  -Will treat with aspirin 81mg + Plavix for 3 weeks, then stop Plavix and continue aspirin monotherapy thereafter  -LDL elevated, continue atorvastatin 80  -A1c elevated, and work on aggressive glycemic control  -PT, OT and speech therapy   -Can start treating blood pressure with no restriction, goal normotension.  Patient is now greater than 24 hours after stroke onset.     -patient was not a thrombolysis candidate because she was out of the window   -patient did not receive thrombectomy because no LVO found         PER ATTENDING      Assessment and Plan:   Cerebrovascular accident (CVA), unspecified mechanism (HCC)  - Neurology on board  - Allow permissive HTN  - Blood thinners and statin therapy per Neurology  - stroke order set completed  - MRI of the brain reviewed  - PT/OT eval  - ultrasound of carotids pending  - can treat BP now      DM2  - SSI low dose  - accuchecks AC and HS     HTN  - will add BP meds  - discussed with nursing     Dyslipidemia  - continue statin      DVT proph- heparin                MEDICATIONS ADMINISTERED IN LAST 1 DAY:  acetaminophen (Tylenol) tab 650 mg       Date Action Dose Route User    7/8/2024 0908 Given 650 mg Oral Maribell Tee RN          aspirin tab 325 mg       Date Action Dose Route User    7/8/2024 0910 Given 325 mg Oral Maribell Tee RN          atorvastatin (Lipitor) tab 80 mg       Date Action Dose Route User    7/8/2024 1959 Given 80 mg Oral Carlene Hood RN          clopidogrel (Plavix) tab 75 mg       Date Action Dose Route User    7/8/2024 1735 Given 75 mg Oral Maribell Tee RN          enoxaparin (Lovenox) 40 MG/0.4ML SUBQ injection 40 mg       Date Action Dose Route User    7/8/2024 0910 Given 40 mg Subcutaneous (Left Lower Abdomen) Maribell Tee RN          insulin aspart (NovoLOG) 100 Units/mL FlexPen 1-5 Units       Date Action Dose Route User    7/8/2024 2138 Given 4 Units Subcutaneous (Left Upper Arm) Carlene Hood RN    7/8/2024 1735 Given 4 Units Subcutaneous (Right Upper Arm) Maribell Tee RN    7/8/2024 1333 Given 4 Units Subcutaneous (Left Lower Arm) Maribell Tee RN    7/8/2024 0936 Given 1 Units Subcutaneous (Right Upper Arm) Maribell Tee RN          iopamidol 76% (ISOVUE-370) injection for power injector       Date Action Dose Route User    7/8/2024 0834 Given 50 mL Intravenous Jose M Baez          lisinopril (Prinivil; Zestril) tab 40 mg       Date Action Dose Route User    7/8/2024 0936 Given 40 mg Oral Maribell Tee RN          metoprolol succinate ER (Toprol XL) 24 hr tab 25 mg       Date Action Dose Route User    7/9/2024 0431 Given 25 mg Oral Carlene Hood RN    7/8/2024 0936 Given 25 mg Oral Maribell Tee RN          potassium chloride (Klor-Con M20) tab 40 mEq       Date Action Dose Route User    7/8/2024 1332 Given 40 mEq Oral Maribell Tee RN    7/8/2024 0910 Given 40 mEq Oral Randal, Fineesha N, RN            Vitals (last day)       Date/Time Temp Pulse Resp BP SpO2  Weight O2 Device O2 Flow Rate (L/min) [facility]    07/09/24 0657 -- -- -- -- -- 144 lb 14.4 oz (65.7 kg) -- -- AG    07/08/24 1113 -- 58 -- 147/89 -- -- -- -- AA    07/08/24 1055 -- -- -- 147/89 -- -- -- -- RW    07/08/24 0800 98.4 °F (36.9 °C) 58 18 184/81 96 % -- None (Room air) -- [place]    07/08/24 0600 98.6 °F (37 °C) 76 16 151/66 97 % -- None (Room air) -- [place]    07/08/24 0400 98.5 °F (36.9 °C) 68 16 160/74 97 % -- None (Room air) -- SD    07/08/24 0200 98.5 °F (36.9 °C) 66 16 127/58 96 % -- None (Room air) -- SD    07/08/24 0000 98.6 °F (37 °C) 67 16 139/70 95 % -- None (Room air) -- SD

## 2024-07-09 NOTE — CM/SW NOTE
07/09/24 1100   Discharge disposition   Expected discharge disposition Home-Health   Post Acute Care Provider   ([facility])   Discharge transportation Private car       Per chart, pt has DC order for today.    Notified [facility] via [facility].      Pt is cleared from SW/CM stand point.      PLAN: Home w/ [facility]          KARL Gil, LSW [id number]

## 2024-07-09 NOTE — DISCHARGE SUMMARY
[facility]  part of [facility]    Discharge Summary    Nely Saravia Patient Status:  Inpatient    1955 MRN [id number]   Location [facility] 3W/SW Attending Munira Cruz MD   Hosp Day # 2 PCP Armando Méndez MD     Date of Admission: 2024   Date of Discharge: 2024    Hospital Discharge Diagnoses: Acute CVA    Lace+ Score: 45  59-90 High Risk  29-58 Medium Risk  0-28   Low Risk.    TCM Follow-Up Recommendation:  LACE > 58: High Risk of readmission after discharge from the hospital.        Admitting Diagnosis: Cerebrovascular accident (CVA), unspecified mechanism (HCC) [I63.9]    Disposition: Home    Discharge Diagnosis: .Principal Problem:    Cerebrovascular accident (CVA), unspecified mechanism (HCC)  Active Problems:    Acute CVA (cerebrovascular accident) (HCC)      Hospital Course:   Reason for Admission:   Nely Saravia is a(n) 69 year old female who has a pmh of DM2, HTN, Dyslipidemia who was admitted for acute dizziness with nausea and vomiting with vertigo for the past 24 hours. Highest blood sugar reported was 293. He denies fever/chills, LOC, chest pain, sob and dyspnea on exertion. Vitals in the ER were /85, HR 76. MRI of the brain revealed a punctate acute infarct along the left aspect of the 4th ventricle. Patient will be admitted to the cardiac floor for further treatment and workup. Stroke order set has been completed, and Neurology has been consulted.       Discharge Physical Exam:   Physical Exam:    General: No acute distress.   Respiratory: Clear to auscultation bilaterally. No wheezes. No rhonchi.  Cardiovascular: S1, S2. Regular rate and rhythm. No murmurs, rubs or gallops.   Abdomen: Soft, nontender, nondistended.  Positive bowel sounds. No rebound or guarding.  Neurologic: No focal neurological deficits.   Musculoskeletal: Moves all extremities.    Hospital Course:   Cerebrovascular accident (CVA), unspecified mechanism (HCC)  -  Neurology on board  - Allow permissive HTN  - ASA and plavix for 3 weeks then ASA monotherapy after- scripts sent to pharmacy   - stroke order set completed  - MRI of the brain reviewed  - PT/OT eval- home with home health  - ultrasound of carotids reviewed  - can treat BP now   - lipitor 80 mg daily- script sent to pharmacy      DM2  - SSI low dose  - accuchecks AC and HS     HTN  - add metoprolol and increase lisinopril- scripts sent to pharmacy   - discussed with nursing     Dyslipidemia  - continue statin      DVT proph- heparin     Full code    Complications: none    Consultants         Provider   Role Specialty     Genna Pizarro MD      Consulting Physician NEUROLOGY                Discharge Plan:   Discharge Condition: Stable    Current Discharge Medication List        New Orders    Details   aspirin 81 MG Oral Tab EC Take 1 tablet (81 mg total) by mouth daily.      clopidogrel 75 MG Oral Tab Take 1 tablet (75 mg total) by mouth daily for 21 days.      metoprolol succinate ER 25 MG Oral Tablet 24 Hr Take 1 tablet (25 mg total) by mouth Daily Beta Blocker.           Home Meds - Modified    Details   lisinopril 40 MG Oral Tab Take 0.5 tablets (20 mg total) by mouth daily.           Home Meds - Unchanged    Details   Insulin NPH & Regular (NOVOLIN 70/30 FLEXPEN RELION) (70-30) 100 UNIT/ML Subcutaneous Suspension Pen-injector Inject 12 Units into the skin in the morning and 12 Units before bedtime.      Meloxicam 7.5 MG Oral Tab Take 1 tablet (7.5 mg total) by mouth daily.      Glucose Blood (ONETOUCH ULTRA) In Vitro Strip To check blood sugars up to twice per day      Blood Glucose Monitoring Suppl (ONETOUCH ULTRA MINI) w/Device Does not apply Kit To use as directed.      atorvastatin 40 MG Oral Tab Take 1 tablet (40 mg total) by mouth every morning.      amLODIPine 10 MG Oral Tab Take 1 tablet (10 mg total) by mouth every morning.      traMADol 50 MG Oral Tab Take 1 tablet (50 mg total) by mouth every 6 (six)  hours as needed for Pain. No alcohol or driving on this med. Stop if lethargic or hallucinating.      neomycin-polymyxin-hydrocortisone 3.5-03107-0 Otic Solution Place 3 drops into the right ear 3 (three) times daily.      ondansetron (ZOFRAN) 4 mg tablet Take 1 tablet (4 mg total) by mouth every 8 (eight) hours as needed for Nausea.                 Discharge Diet: ADA diet     Discharge Activity: As tolerated       Discharge Medications        START taking these medications        Instructions Prescription details   aspirin 81 MG Tbec  Start taking on: July 10, 2024      Take 1 tablet (81 mg total) by mouth daily.   Quantity: 30 tablet  Refills: 1     clopidogrel 75 MG Tabs  Commonly known as: Plavix  Start taking on: July 10, 2024      Take 1 tablet (75 mg total) by mouth daily for 21 days.   Stop taking on: July 31, 2024  Quantity: 21 tablet  Refills: 0     metoprolol succinate ER 25 MG Tb24  Commonly known as: Toprol XL  Start taking on: July 10, 2024      Take 1 tablet (25 mg total) by mouth Daily Beta Blocker.   Quantity: 30 tablet  Refills: 1            CHANGE how you take these medications        Instructions Prescription details   lisinopril 40 MG Tabs  Commonly known as: Prinivil; Zestril  What changed: medication strength      Take 0.5 tablets (20 mg total) by mouth daily.   Quantity: 30 tablet  Refills: 1            CONTINUE taking these medications        Instructions Prescription details   amLODIPine 10 MG Tabs  Commonly known as: Norvasc      Take 1 tablet (10 mg total) by mouth every morning.   Quantity: 90 tablet  Refills: 3     atorvastatin 40 MG Tabs  Commonly known as: Lipitor      Take 1 tablet (40 mg total) by mouth every morning.   Quantity: 90 tablet  Refills: 3     Meloxicam 7.5 MG Tabs      Take 1 tablet (7.5 mg total) by mouth daily.   Quantity: 30 tablet  Refills: 1     neomycin-polymyxin-hydrocortisone 3.5-15751-5 Soln  Commonly known as: Cortisporin      Place 3 drops into the right ear  3 (three) times daily.   Quantity: 10 mL  Refills: 0     NovoLIN 70/30 FlexPen Relion (70-30) 100 UNIT/ML Supn  Generic drug: Insulin NPH & Regular      Inject 12 Units into the skin in the morning and 12 Units before bedtime.   Quantity: 1 each  Refills: 2     ondansetron 4 mg tablet  Commonly known as: Zofran      Take 1 tablet (4 mg total) by mouth every 8 (eight) hours as needed for Nausea.   Quantity: 20 tablet  Refills: 0     OneTouch Ultra Mini w/Device Kit      To use as directed.   Quantity: 1 kit  Refills: 0     OneTouch Ultra Strp  Generic drug: Glucose Blood      To check blood sugars up to twice per day   Quantity: 100 strip  Refills: 2     traMADol 50 MG Tabs  Commonly known as: Ultram      Take 1 tablet (50 mg total) by mouth every 6 (six) hours as needed for Pain. No alcohol or driving on this med. Stop if lethargic or hallucinating.   Quantity: 20 tablet  Refills: 0               Where to Get Your Medications        These medications were sent to [id number] - [address] [phone], [phone]  [address] [place] [address]      Phone: [phone]   aspirin 81 MG Tbec  clopidogrel 75 MG Tabs  lisinopril 40 MG Tabs  metoprolol succinate ER 25 MG Tb24         Follow up:       Follow up Labs and imaging:         Other Discharge Instructions:         Home Health:  Sometimes managing your health at home requires assistance.  The [facility] team has recognized your preference to use [facility]  A representative from the home health agency will contact you or your family to schedule your first visit.      [facility]  [address] [place] [id number]  Phone: [phone]  Fax: [phone]          Time spent:  > 30 minutes    JENSEN KELLY MD  7/9/2024

## 2024-07-09 NOTE — CM/SW NOTE
07/09/24 1106   Choice of Post-Acute Provider   Informed patient of right to choose their preferred provider Yes   List of appropriate post-acute services provided to patient/family with quality data Yes   Patient/family choice [facility]   Information given to Patient       SW met w/ pt at bedside as f/up for HHC choice. Pt confirmed review of list provided and has [facility]. SW Informed pt that HHC info will be added to her AVS and the agency will reach out to her post DC to arrange the first appt.    No questions at this time from pt.    [facility] reserved in [facility] and notified of choice. HH instructions added to pt's AVS.      PLAN: Home w/ [facility] - pending med clear      SW/CM to remain available for support and/or discharge planning.         Kimberly Goff, MSW, LSW [id number]

## 2024-07-09 NOTE — DISCHARGE PLANNING
This RN reviewed DC paperwork with the patient. IV and tele removed. Answered all questions at this time. MD ok with the patient picking up her medication at 2pm tomorrow. Patient is stable at time of discharge.

## 2024-07-09 NOTE — PLAN OF CARE
No acute changes overnight. Neurochecks remain unchanged. No complaints of pain. Up with assist to bathroom. Plan for discharge home with [facility] when medically stable.     Problem: Patient Centered Care  Goal: Patient preferences are identified and integrated in the patient's plan of care  Description: Interventions:  - What would you like us to know as we care for you? From home w/  & daughter  - Provide timely, complete, and accurate information to patient/family  - Incorporate patient and family knowledge, values, beliefs, and cultural backgrounds into the planning and delivery of care  - Encourage patient/family to participate in care and decision-making at the level they choose  - Honor patient and family perspectives and choices  Outcome: Progressing     Problem: METABOLIC/FLUID AND ELECTROLYTES - ADULT  Goal: Glucose maintained within prescribed range  Description: INTERVENTIONS:  - Monitor Blood Glucose as ordered  - Assess for signs and symptoms of hyperglycemia and hypoglycemia  - Administer ordered medications to maintain glucose within target range  - Assess barriers to adequate nutritional intake and initiate nutrition consult as needed  - Instruct patient on self management of diabetes  Outcome: Progressing  Goal: Electrolytes maintained within normal limits  Description: INTERVENTIONS:  - Monitor labs and rhythm and assess patient for signs and symptoms of electrolyte imbalances  - Administer electrolyte replacement as ordered  - Monitor response to electrolyte replacements, including rhythm and repeat lab results as appropriate  - Fluid restriction as ordered  - Instruct patient on fluid and nutrition restrictions as appropriate  Outcome: Progressing  Goal: Hemodynamic stability and optimal renal function maintained  Description: INTERVENTIONS:  - Monitor labs and assess for signs and symptoms of volume excess or deficit  - Monitor intake, output and patient weight  - Monitor urine specific  gravity, serum osmolarity and serum sodium as indicated or ordered  - Monitor response to interventions for patient's volume status, including labs, urine output, blood pressure (other measures as available)  - Encourage oral intake as appropriate  - Instruct patient on fluid and nutrition restrictions as appropriate  Outcome: Progressing     Problem: NEUROLOGICAL - ADULT  Goal: Achieves stable or improved neurological status  Description: INTERVENTIONS  - Assess for and report changes in neurological status  - Initiate measures to prevent increased intracranial pressure  - Maintain blood pressure and fluid volume within ordered parameters to optimize cerebral perfusion and minimize risk of hemorrhage  - Monitor temperature, glucose, and sodium. Initiate appropriate interventions as ordered  Outcome: Progressing  Goal: Absence of seizures  Description: INTERVENTIONS  - Monitor for seizure activity  - Administer anti-seizure medications as ordered  - Monitor neurological status  Outcome: Progressing  Goal: Remains free of injury related to seizure activity  Description: INTERVENTIONS:  - Maintain airway, patient safety  and administer oxygen as ordered  - Monitor patient for seizure activity, document and report duration and description of seizure to MD/LIP  - If seizure occurs, turn patient to side and suction secretions as needed  - Reorient patient post seizure  - Seizure pads on all 4 side rails  - Instruct patient/family to notify RN of any seizure activity  - Instruct patient/family to call for assistance with activity based on assessment  Outcome: Progressing  Goal: Achieves maximal functionality and self care  Description: INTERVENTIONS  - Monitor swallowing and airway patency with patient fatigue and changes in neurological status  - Encourage and assist patient to increase activity and self care with guidance from PT/OT  - Encourage visually impaired, hearing impaired and aphasic patients to use  assistive/communication devices  Outcome: Progressing     Problem: Patient/Family Goals  Goal: Patient/Family Long Term Goal  Description: Patient's Long Term Goal: Go home    Interventions:  - Monitor labs  - Diagnostic testing  - Follow MD orders  - See additional Care Plan goals for specific interventions  Outcome: Progressing  Goal: Patient/Family Short Term Goal  Description: Patient's Short Term Goal: Controlled blood sugars    Interventions:   - Accucheks  - Insulin coverage  - Follow MD orders  - See additional Care Plan goals for specific interventions  Outcome: Progressing

## 2024-07-09 NOTE — DIABETES ED
[facility]    Diabetes Education  Note    Nely Saravia Patient Status:  Inpatient   1955 MRN [id number]  Location [facility] 3W/SW Attending Munira Cruz MD  Hosp Day # 2 PCP Armando Méndez MD      Labs:    HEMOGLOBIN A1C (%)   Date Value   2022 8 (A)     HgbA1C (%)   Date Value   2024 14.7 (H)         Reason for Visit:Elevated A1c value.  Met with patient in room to discuss elevated A1c value.  She reports she has not taken her insulin medication \"in a while\". She is followed by Dr. Méndez in the outpatient settings however has been seen by an Endocrinologist clinic previously.  She reports she had difficulty with insurance coverage last year however is not associated with [employer] and able to attend Md appointments.    She states she does not eat pasta or breads often.  We reviewed her current choices for the lunch meal that included a chicken, non starchy vegetables and fruit.  Educated on Balanced Plate method of planning and she reports she typically has similar meal at home. Provided her with introduction to carbohydrate handout and encouraged her to review.    She administers [employer] Novolin 70/30 mixed insulin when taking medications as directed. Recommend she take medications as directed, monitor her blood sugar regularly and record blood sugar results to discuss with Dr. Méndez at next outpatient visit. She indicates next appointment in on 24.      Education Provided:  Benefits of testing BG as directed - record results and report to MD  Basic Diet Guidelines  Importance of close follow up with PCP and medical team    Patient verbalized understanding and was receptive to information provided.      Recommendations:  Administer insulin medications as directed.  Record blood sugar results and discuss with Dr. Méndez at next appointment.  Attend outpatient diabetes education          Caterina Tirado RN  Diabetes Educator  2024  1:05 PM     Doxepin Counseling:  Patient advised that the medication is sedating and not to drive a car after taking this medication. Patient informed of potential adverse effects including but not limited to dry mouth, urinary retention, and blurry vision.  The patient verbalized understanding of the proper use and possible adverse effects of doxepin.  All of the patient's questions and concerns were addressed.

## 2024-07-10 ENCOUNTER — PATIENT OUTREACH (OUTPATIENT)
Dept: CASE MANAGEMENT | Age: 69
End: 2024-07-10

## 2024-07-10 ENCOUNTER — LAB ENCOUNTER (OUTPATIENT)
Dept: LAB | Age: 69
End: 2024-07-10
Attending: FAMILY MEDICINE
Payer: MEDICARE

## 2024-07-10 ENCOUNTER — OFFICE VISIT (OUTPATIENT)
Dept: FAMILY MEDICINE CLINIC | Facility: CLINIC | Age: 69
End: 2024-07-10

## 2024-07-10 VITALS
HEART RATE: 73 BPM | BODY MASS INDEX: 26.33 KG/M2 | DIASTOLIC BLOOD PRESSURE: 77 MMHG | HEIGHT: 65 IN | SYSTOLIC BLOOD PRESSURE: 130 MMHG | WEIGHT: 158 LBS

## 2024-07-10 DIAGNOSIS — Z02.9 ENCOUNTERS FOR ADMINISTRATIVE PURPOSE: Primary | ICD-10-CM

## 2024-07-10 DIAGNOSIS — I10 PRIMARY HYPERTENSION: ICD-10-CM

## 2024-07-10 DIAGNOSIS — E78.00 PURE HYPERCHOLESTEROLEMIA: ICD-10-CM

## 2024-07-10 DIAGNOSIS — I63.9 ACUTE CVA (CEREBROVASCULAR ACCIDENT) (HCC): ICD-10-CM

## 2024-07-10 DIAGNOSIS — E11.65 UNCONTROLLED TYPE 2 DIABETES MELLITUS WITH HYPERGLYCEMIA (HCC): ICD-10-CM

## 2024-07-10 DIAGNOSIS — I63.9 ACUTE CVA (CEREBROVASCULAR ACCIDENT) (HCC): Primary | ICD-10-CM

## 2024-07-10 LAB — VIT B12 SERPL-MCNC: 487 PG/ML (ref 211–911)

## 2024-07-10 PROCEDURE — 36415 COLL VENOUS BLD VENIPUNCTURE: CPT

## 2024-07-10 PROCEDURE — 82607 VITAMIN B-12: CPT

## 2024-07-10 PROCEDURE — 99496 TRANSJ CARE MGMT HIGH F2F 7D: CPT | Performed by: FAMILY MEDICINE

## 2024-07-10 NOTE — PAYOR COMM NOTE
--------------  DISCHARGE REVIEW    Payor: [employer]  Subscriber #:  [id number]  Authorization Number: [id number]    Admit date: 24  Admit time:   8:40 PM  Discharge Date: 2024  1:53 PM     Admitting Physician: Munira Cruz MD  Attending Physician:  No att. providers found  Primary Care Physician: Armando Méndez MD          Discharge Summary Notes        Discharge Summary signed by Munira Cruz MD at 2024 12:22 PM       Author: Munira Cruz MD Specialty: HOSPITALIST Author Type: Physician    Filed: 2024 12:22 PM Date of Service: 2024 12:15 PM Status: Addendum    : Munira Cruz MD (Physician)    Related Notes: Original Note by Munira Cruz MD (Physician) filed at 2024 12:18 PM         [facility]  part of [facility]    Discharge Summary    Nely Saravia Patient Status:  Inpatient    1955 MRN [id number]   Location [facility] 3W/SW Attending Munira Cruz MD   Hosp Day # 2 PCP Armando Méndez MD     Date of Admission: 2024   Date of Discharge: 2024    Hospital Discharge Diagnoses: Acute CVA    Lace+ Score: 45  59-90 High Risk  29-58 Medium Risk  0-28   Low Risk.    TCM Follow-Up Recommendation:  LACE > 58: High Risk of readmission after discharge from the hospital.        Admitting Diagnosis: Cerebrovascular accident (CVA), unspecified mechanism (HCC) [I63.9]    Disposition: Home    Discharge Diagnosis: .Principal Problem:    Cerebrovascular accident (CVA), unspecified mechanism (HCC)  Active Problems:    Acute CVA (cerebrovascular accident) (HCC)      Hospital Course:   Reason for Admission:   Nely Saravia is a(n) 69 year old female who has a pmh of DM2, HTN, Dyslipidemia who was admitted for acute dizziness with nausea and vomiting with vertigo for the past 24 hours. Highest blood sugar reported was 293. He denies fever/chills, LOC, chest pain, sob and dyspnea on exertion. Vitals in the ER were /85, HR 76.  MRI of the brain revealed a punctate acute infarct along the left aspect of the 4th ventricle. Patient will be admitted to the cardiac floor for further treatment and workup. Stroke order set has been completed, and Neurology has been consulted.       Discharge Physical Exam:   Physical Exam:    General: No acute distress.   Respiratory: Clear to auscultation bilaterally. No wheezes. No rhonchi.  Cardiovascular: S1, S2. Regular rate and rhythm. No murmurs, rubs or gallops.   Abdomen: Soft, nontender, nondistended.  Positive bowel sounds. No rebound or guarding.  Neurologic: No focal neurological deficits.   Musculoskeletal: Moves all extremities.    Hospital Course:   Cerebrovascular accident (CVA), unspecified mechanism (HCC)  - Neurology on board  - Allow permissive HTN  - ASA and plavix for 3 weeks then ASA monotherapy after- scripts sent to pharmacy   - stroke order set completed  - MRI of the brain reviewed  - PT/OT eval- home with home health  - ultrasound of carotids reviewed  - can treat BP now   - lipitor 80 mg daily- script sent to pharmacy      DM2  - SSI low dose  - accuchecks AC and HS     HTN  - add metoprolol and increase lisinopril- scripts sent to pharmacy   - discussed with nursing     Dyslipidemia  - continue statin      DVT proph- heparin     Full code    Complications: none    Consultants         Provider   Role Specialty     Genna Pizarro MD      Consulting Physician NEUROLOGY                Discharge Plan:   Discharge Condition: Stable    Current Discharge Medication List        New Orders    Details   aspirin 81 MG Oral Tab EC Take 1 tablet (81 mg total) by mouth daily.      clopidogrel 75 MG Oral Tab Take 1 tablet (75 mg total) by mouth daily for 21 days.      metoprolol succinate ER 25 MG Oral Tablet 24 Hr Take 1 tablet (25 mg total) by mouth Daily Beta Blocker.           Home Meds - Modified    Details   lisinopril 40 MG Oral Tab Take 0.5 tablets (20 mg total) by mouth daily.            Home Meds - Unchanged    Details   Insulin NPH & Regular (NOVOLIN 70/30 FLEXPEN RELION) (70-30) 100 UNIT/ML Subcutaneous Suspension Pen-injector Inject 12 Units into the skin in the morning and 12 Units before bedtime.      Meloxicam 7.5 MG Oral Tab Take 1 tablet (7.5 mg total) by mouth daily.      Glucose Blood (ONETOUCH ULTRA) In Vitro Strip To check blood sugars up to twice per day      Blood Glucose Monitoring Suppl (ONETOUCH ULTRA MINI) w/Device Does not apply Kit To use as directed.      atorvastatin 40 MG Oral Tab Take 1 tablet (40 mg total) by mouth every morning.      amLODIPine 10 MG Oral Tab Take 1 tablet (10 mg total) by mouth every morning.      traMADol 50 MG Oral Tab Take 1 tablet (50 mg total) by mouth every 6 (six) hours as needed for Pain. No alcohol or driving on this med. Stop if lethargic or hallucinating.      neomycin-polymyxin-hydrocortisone 3.5-13550-3 Otic Solution Place 3 drops into the right ear 3 (three) times daily.      ondansetron (ZOFRAN) 4 mg tablet Take 1 tablet (4 mg total) by mouth every 8 (eight) hours as needed for Nausea.                 Discharge Diet: ADA diet     Discharge Activity: As tolerated       Discharge Medications        START taking these medications        Instructions Prescription details   aspirin 81 MG Tbec  Start taking on: July 10, 2024      Take 1 tablet (81 mg total) by mouth daily.   Quantity: 30 tablet  Refills: 1     clopidogrel 75 MG Tabs  Commonly known as: Plavix  Start taking on: July 10, 2024      Take 1 tablet (75 mg total) by mouth daily for 21 days.   Stop taking on: July 31, 2024  Quantity: 21 tablet  Refills: 0     metoprolol succinate ER 25 MG Tb24  Commonly known as: Toprol XL  Start taking on: July 10, 2024      Take 1 tablet (25 mg total) by mouth Daily Beta Blocker.   Quantity: 30 tablet  Refills: 1            CHANGE how you take these medications        Instructions Prescription details   lisinopril 40 MG Tabs  Commonly known as:  Prinivil; Zestril  What changed: medication strength      Take 0.5 tablets (20 mg total) by mouth daily.   Quantity: 30 tablet  Refills: 1            CONTINUE taking these medications        Instructions Prescription details   amLODIPine 10 MG Tabs  Commonly known as: Norvasc      Take 1 tablet (10 mg total) by mouth every morning.   Quantity: 90 tablet  Refills: 3     atorvastatin 40 MG Tabs  Commonly known as: Lipitor      Take 1 tablet (40 mg total) by mouth every morning.   Quantity: 90 tablet  Refills: 3     Meloxicam 7.5 MG Tabs      Take 1 tablet (7.5 mg total) by mouth daily.   Quantity: 30 tablet  Refills: 1     neomycin-polymyxin-hydrocortisone 3.5-53249-9 Soln  Commonly known as: Cortisporin      Place 3 drops into the right ear 3 (three) times daily.   Quantity: 10 mL  Refills: 0     NovoLIN 70/30 FlexPen Relion (70-30) 100 UNIT/ML Supn  Generic drug: Insulin NPH & Regular      Inject 12 Units into the skin in the morning and 12 Units before bedtime.   Quantity: 1 each  Refills: 2     ondansetron 4 mg tablet  Commonly known as: Zofran      Take 1 tablet (4 mg total) by mouth every 8 (eight) hours as needed for Nausea.   Quantity: 20 tablet  Refills: 0     OneTouch Ultra Mini w/Device Kit      To use as directed.   Quantity: 1 kit  Refills: 0     OneTouch Ultra Strp  Generic drug: Glucose Blood      To check blood sugars up to twice per day   Quantity: 100 strip  Refills: 2     traMADol 50 MG Tabs  Commonly known as: Ultram      Take 1 tablet (50 mg total) by mouth every 6 (six) hours as needed for Pain. No alcohol or driving on this med. Stop if lethargic or hallucinating.   Quantity: 20 tablet  Refills: 0               Where to Get Your Medications        These medications were sent to [id number] - [place], [place] - [address] [phone], [phone]  [address] [facility] [address]      Phone: [phone]   aspirin 81 MG Tbec  clopidogrel 75 MG Tabs  lisinopril 40  MG Tabs  metoprolol succinate ER 25 MG Tb24         Follow up:       Follow up Labs and imaging:         Other Discharge Instructions:         Home Health:  Sometimes managing your health at home requires assistance.  The [facility] team has recognized your preference to use [facility]  A representative from the home health agency will contact you or your family to schedule your first visit.      [employer]  [address] [place] [address]  Phone: [phone]  Fax: [phone]          Time spent:  > 30 minutes    MUNIRA KLELY MD  7/9/2024      Electronically signed by Munira Kelly MD on 7/9/2024 12:22 PM         REVIEWER COMMENTS

## 2024-07-10 NOTE — PROGRESS NOTES
NCM planned to contact patient for TCM however, TCM HFU appointment was completed today with PCP office-- Dr. Massey 07/10/2024. NCM closing encounter.     7/10/2024  [facility], [facility], Khai Cano MD  Family Medicine Acute CVA (cerebrovascular accident) (HCC) +3 more  Dx TCM (Transition Of Care Management)   Reason for Visit     Reason for Visit    TCM (Transition Of Care Management) Was in the hospital for High BS and HTN

## 2024-07-10 NOTE — PROGRESS NOTES
7/10/2024  9:54 AM    Nely Saravia is a 69 year old female.    Chief complaint(s):   Chief Complaint   Patient presents with    TCM (Transition Of Care Management)     Was in the hospital for High BS and HTN      HPI:     Nely Saravia primary complaint is regarding CVA.     Nely Saravia is a 69 year old female here today for hospital follow up.   She was discharged from Inpatient hospital, [facility]  to Home   Admit Date: 07/07/2024  Discharge Date: 07/09/2024  Hospital Discharge Diagnosis: CVA    Interactive contact within 2 business days post discharge first initiated on Date:  CVA    During the visit, the following was completed:  Obtained and reviewed discharge summary, continuity of care documents, and Hospitalist notes  Reviewed Labs (CBC, CMP)  specialists already aware of assumption/resumption of care  Education given to patient on self-management, independent living, and activities of daily living.  Referrals as listed below in orders Assisted in scheduling required follow-up with community providers and services.    Medication Reconciliation:  I am aware of an inpatient discharge within the last 30 days.  The discharge medication list has been reconciled with the patient's current medication list and reviewed by me.  See medication list for additions of new medication, and changes to current doses of medications and discontinued medications.    Transitional Care Management Certification:  I certify that the following are true:  Communication with the patient was made within 2 business days of discharge on date above   Medical Decision Making- Based on service period of discharge to 30 days:   Number of Possible Diagnoses and/or Management Options: moderate  Amount and/or Complexity of Data to Be Reviewed: moderate  Risk of Significant Complications, Morbidity, and/or Mortality: moderate    Overall Risk:   moderate    Patient seen within 7 days from date of discharge.     Patient is a  69-year-old female with a history of diabetes type 2, hypertension, hyperlipidemia who presented to the emergency room complaining of acute dizziness, nausea vomiting and vertigo.  Had elevated sugar levels and Amoride of the brain revealed a punctate acute infarct on the left side. 2-d echo was normal. Blood pressure admission was elevated, A1c at 14.7. Already seeing Neurologist and has a follow up appointment this August. Not taking most of her medications.      MRI Brain Impression   CONCLUSION:     Punctate acute infarct involving the periventricular white matter along the left aspect of the 4th ventricle.         HISTORY:  Past Medical History:    Diabetes (HCC)    High blood pressure    Hyperlipidemia      Past Surgical History:   Procedure Laterality Date    Hysterectomy        Family History   Problem Relation Age of Onset    Diabetes Father     Hypertension Father     Hypertension Mother       Social History:   Social History     Socioeconomic History    Marital status:    Tobacco Use    Smoking status: Never    Smokeless tobacco: Never   Vaping Use    Vaping status: Never Used   Substance and Sexual Activity    Alcohol use: Never    Drug use: Never     Social Determinants of Health     Food Insecurity: No Food Insecurity (7/7/2024)    Food Insecurity     Food Insecurity: Never true   Transportation Needs: No Transportation Needs (7/7/2024)    Transportation Needs     Lack of Transportation: No   Housing Stability: Low Risk  (7/7/2024)    Housing Stability     Housing Instability: No        Immunizations:   Immunization History   Administered Date(s) Administered    Covid-19 Vaccine Pfizer 30 mcg/0.3 ml 03/03/2021, 03/18/2021, 11/17/2021    Covid-19 Vaccine Pfizer Bivalent 30mcg/0.3mL 11/04/2022    Covid-19 Vaccine Pfizer Henry-Sucrose 30 mcg/0.3 ml 04/24/2022    FLU VAC High Dose 65 YRS & Older PRSV Free (15861) 10/07/2021, 11/04/2022    Fluzone Vaccine Medicare () 10/07/2021    Pneumococcal  Conjugate PCV20 06/22/2022       Medications (Active prior to today's visit):  Current Outpatient Medications   Medication Sig Dispense Refill    lisinopril 40 MG Oral Tab Take 0.5 tablets (20 mg total) by mouth daily. 30 tablet 1    aspirin 81 MG Oral Tab EC Take 1 tablet (81 mg total) by mouth daily. 30 tablet 1    clopidogrel 75 MG Oral Tab Take 1 tablet (75 mg total) by mouth daily for 21 days. 21 tablet 0    metoprolol succinate ER 25 MG Oral Tablet 24 Hr Take 1 tablet (25 mg total) by mouth Daily Beta Blocker. 30 tablet 1    atorvastatin 40 MG Oral Tab Take 2 tablets (80 mg total) by mouth every morning. 90 tablet 3    Meloxicam 7.5 MG Oral Tab Take 1 tablet (7.5 mg total) by mouth daily. 30 tablet 1    Glucose Blood (ONETOUCH ULTRA) In Vitro Strip To check blood sugars up to twice per day 100 strip 2    Blood Glucose Monitoring Suppl (ONETOUCH ULTRA MINI) w/Device Does not apply Kit To use as directed. 1 kit 0    amLODIPine 10 MG Oral Tab Take 1 tablet (10 mg total) by mouth every morning. 90 tablet 3    Insulin NPH & Regular (NOVOLIN 70/30 FLEXPEN RELION) (70-30) 100 UNIT/ML Subcutaneous Suspension Pen-injector Inject 12 Units into the skin in the morning and 12 Units before bedtime. 1 each 2    traMADol 50 MG Oral Tab Take 1 tablet (50 mg total) by mouth every 6 (six) hours as needed for Pain. No alcohol or driving on this med. Stop if lethargic or hallucinating. 20 tablet 0    neomycin-polymyxin-hydrocortisone 3.5-92334-7 Otic Solution Place 3 drops into the right ear 3 (three) times daily. 10 mL 0    ondansetron (ZOFRAN) 4 mg tablet Take 1 tablet (4 mg total) by mouth every 8 (eight) hours as needed for Nausea. 20 tablet 0       Allergies:  No Known Allergies      ROS:   Review of Systems   Constitutional:  Negative for appetite change, diaphoresis, fatigue and fever.   HENT:  Negative for hearing loss and nosebleeds.    Eyes:  Negative for visual disturbance.   Respiratory:  Negative for shortness of  breath.    Cardiovascular:  Negative for chest pain and palpitations.   Gastrointestinal:  Negative for abdominal pain.   Endocrine: Negative for polydipsia and polyuria.   Genitourinary:  Negative for hematuria and menstrual problem.   Musculoskeletal:  Negative for arthralgias.   Skin:  Negative for rash.   Neurological:  Positive for dizziness and weakness. Negative for headaches.   Psychiatric/Behavioral:  Negative for dysphoric mood and sleep disturbance.        PHYSICAL EXAM:   VS: /77 (BP Location: Right arm, Patient Position: Sitting, Cuff Size: adult)   Pulse 73   Ht 5' 5\" (1.651 m)   Wt 158 lb (71.7 kg)   BMI 26.29 kg/m²     Physical Exam  Vitals reviewed.   Constitutional:       General: She is not in acute distress.     Appearance: Normal appearance.   HENT:      Head: Normocephalic.   Eyes:      Conjunctiva/sclera: Conjunctivae normal.   Neck:      Vascular: No carotid bruit.   Cardiovascular:      Rate and Rhythm: Normal rate. Rhythm regularly irregular.   Pulmonary:      Effort: Pulmonary effort is normal.      Breath sounds: Normal breath sounds.   Musculoskeletal:      Cervical back: Neck supple.   Skin:     Findings: No rash.   Neurological:      Cranial Nerves: Cranial nerves 2-12 are intact.      Sensory: Sensation is intact.      Motor: Weakness present. No tremor.      Deep Tendon Reflexes:      Reflex Scores:       Bicep reflexes are 2+ on the right side and 2+ on the left side.     Comments: Minimal right sided weakness   Psychiatric:         Mood and Affect: Mood normal.         LABORATORY RESULTS:   No results found for: \"URCOLOR\", \"URCLA\", \"URINELEUK\", \"URINENITRITE\", \"URINEBLOOD\"   Results for orders placed or performed during the hospital encounter of 07/07/24   Comp Metabolic Panel (14)   Result Value Ref Range    Glucose 196 (H) 70 - 99 mg/dL    Sodium 141 136 - 145 mmol/L    Potassium 3.5 3.5 - 5.1 mmol/L    Chloride 106 98 - 112 mmol/L    CO2 25.0 21.0 - 32.0 mmol/L     Anion Gap 10 0 - 18 mmol/L    BUN 9 9 - 23 mg/dL    Creatinine 0.70 0.55 - 1.02 mg/dL    BUN/CREA Ratio 12.9 10.0 - 20.0    Calcium, Total 9.3 8.7 - 10.4 mg/dL    Calculated Osmolality 296 (H) 275 - 295 mOsm/kg    eGFR-Cr 94 >=60 mL/min/1.73m2    ALT 14 10 - 49 U/L    AST 19 <=34 U/L    Alkaline Phosphatase 93 55 - 142 U/L    Bilirubin, Total 0.7 0.2 - 1.1 mg/dL    Total Protein 6.8 5.7 - 8.2 g/dL    Albumin 4.2 3.2 - 4.8 g/dL    Globulin  2.6 2.0 - 3.5 g/dL    A/G Ratio 1.6 1.0 - 2.0   Lipase   Result Value Ref Range    Lipase 27 13 - 75 U/L   Troponin I (High Sensitivity)   Result Value Ref Range    Troponin I (High Sensitivity) 14 <=34 ng/L   Hemoglobin A1C   Result Value Ref Range    HgbA1C 14.7 (H) <5.7 %    Estimated Average Glucose 375 (H) 68 - 126 mg/dL   Lipid Panel   Result Value Ref Range    Cholesterol, Total 201 (H) <200 mg/dL    HDL Cholesterol 44 40 - 59 mg/dL    Triglycerides 117 30 - 149 mg/dL    LDL Cholesterol 136 (H) <100 mg/dL    VLDL 21 0 - 30 mg/dL    Non HDL Chol 157 (H) <130 mg/dL   Basic Metabolic Panel (8)   Result Value Ref Range    Glucose 208 (H) 70 - 99 mg/dL    Sodium 143 136 - 145 mmol/L    Potassium 3.2 (L) 3.5 - 5.1 mmol/L    Chloride 110 98 - 112 mmol/L    CO2 27.0 21.0 - 32.0 mmol/L    Anion Gap 6 0 - 18 mmol/L    BUN 10 9 - 23 mg/dL    Creatinine 0.68 0.55 - 1.02 mg/dL    BUN/CREA Ratio 14.7 10.0 - 20.0    Calcium, Total 8.9 8.7 - 10.4 mg/dL    Calculated Osmolality 301 (H) 275 - 295 mOsm/kg    eGFR-Cr 94 >=60 mL/min/1.73m2   Potassium   Result Value Ref Range    Potassium 4.0 3.5 - 5.1 mmol/L   CBC, Platelet; No Differential   Result Value Ref Range    WBC 10.5 4.0 - 11.0 x10(3) uL    RBC 4.67 3.80 - 5.30 x10(6)uL    HGB 14.5 12.0 - 16.0 g/dL    HCT 41.7 35.0 - 48.0 %    MCV 89.3 80.0 - 100.0 fL    MCH 31.0 26.0 - 34.0 pg    MCHC 34.8 31.0 - 37.0 g/dL    RDW 12.5 11.0 - 15.0 %    RDW-SD 40.7 35.1 - 46.3 fL    .0 150.0 - 450.0 10(3)uL   Basic Metabolic Panel (8)   Result  Value Ref Range    Glucose 258 (H) 70 - 99 mg/dL    Sodium 140 136 - 145 mmol/L    Potassium 4.2 3.5 - 5.1 mmol/L    Chloride 108 98 - 112 mmol/L    CO2 26.0 21.0 - 32.0 mmol/L    Anion Gap 6 0 - 18 mmol/L    BUN 8 (L) 9 - 23 mg/dL    Creatinine 0.77 0.55 - 1.02 mg/dL    BUN/CREA Ratio 10.4 10.0 - 20.0    Calcium, Total 9.1 8.7 - 10.4 mg/dL    Calculated Osmolality 297 (H) 275 - 295 mOsm/kg    eGFR-Cr 83 >=60 mL/min/1.73m2   EKG   Result Value Ref Range    Ventricular rate 67 BPM    Atrial rate 67 BPM    P-R Interval 164 ms    QRS Duration 82 ms    Q-T Interval 392 ms    QTC Calculation (Bezet) 414 ms    P Axis 31 degrees    R Axis -9 degrees    T Axis 89 degrees   POCT Glucose   Result Value Ref Range    POC Glucose  171 (H) 70 - 99 mg/dL   POCT Glucose   Result Value Ref Range    POC Glucose  166 (H) 70 - 99 mg/dL   POCT Glucose   Result Value Ref Range    POC Glucose  127 (H) 70 - 99 mg/dL   POCT Glucose   Result Value Ref Range    POC Glucose  198 (H) 70 - 99 mg/dL   POCT Glucose   Result Value Ref Range    POC Glucose  287 (H) 70 - 99 mg/dL   POCT Glucose   Result Value Ref Range    POC Glucose  294 (H) 70 - 99 mg/dL   POCT Glucose   Result Value Ref Range    POC Glucose  281 (H) 70 - 99 mg/dL   POCT Glucose   Result Value Ref Range    POC Glucose  238 (H) 70 - 99 mg/dL   POCT Glucose   Result Value Ref Range    POC Glucose  296 (H) 70 - 99 mg/dL   RAINBOW DRAW LAVENDER   Result Value Ref Range    Hold Lavender Auto Resulted    RAINBOW DRAW LIGHT GREEN   Result Value Ref Range    Hold Lt Green Auto Resulted    RAINBOW DRAW BLUE   Result Value Ref Range    Hold Blue Auto Resulted    RAINBOW DRAW GOLD   Result Value Ref Range    Hold Gold Auto Resulted    CBC W/ DIFFERENTIAL   Result Value Ref Range    WBC 15.6 (H) 4.0 - 11.0 x10(3) uL    RBC 4.88 3.80 - 5.30 x10(6)uL    HGB 15.2 12.0 - 16.0 g/dL    HCT 43.3 35.0 - 48.0 %    MCV 88.7 80.0 - 100.0 fL    MCH 31.1 26.0 - 34.0 pg    MCHC 35.1 31.0 - 37.0 g/dL     RDW-SD 40.7 35.1 - 46.3 fL    RDW 12.4 11.0 - 15.0 %    .0 150.0 - 450.0 10(3)uL    Neutrophil Absolute Prelim 12.05 (H) 1.50 - 7.70 x10 (3) uL    Neutrophil Absolute 12.05 (H) 1.50 - 7.70 x10(3) uL    Lymphocyte Absolute 2.40 1.00 - 4.00 x10(3) uL    Monocyte Absolute 0.96 0.10 - 1.00 x10(3) uL    Eosinophil Absolute 0.07 0.00 - 0.70 x10(3) uL    Basophil Absolute 0.04 0.00 - 0.20 x10(3) uL    Immature Granulocyte Absolute 0.07 0.00 - 1.00 x10(3) uL    Neutrophil % 77.3 %    Lymphocyte % 15.4 %    Monocyte % 6.2 %    Eosinophil % 0.4 %    Basophil % 0.3 %    Immature Granulocyte % 0.4 %   CBC W/ DIFFERENTIAL   Result Value Ref Range    WBC 12.3 (H) 4.0 - 11.0 x10(3) uL    RBC 4.39 3.80 - 5.30 x10(6)uL    HGB 13.9 12.0 - 16.0 g/dL    HCT 39.1 35.0 - 48.0 %    MCV 89.1 80.0 - 100.0 fL    MCH 31.7 26.0 - 34.0 pg    MCHC 35.5 31.0 - 37.0 g/dL    RDW-SD 41.1 35.1 - 46.3 fL    RDW 12.5 11.0 - 15.0 %    .0 150.0 - 450.0 10(3)uL    Neutrophil Absolute Prelim 7.83 (H) 1.50 - 7.70 x10 (3) uL    Neutrophil Absolute 7.83 (H) 1.50 - 7.70 x10(3) uL    Lymphocyte Absolute 3.04 1.00 - 4.00 x10(3) uL    Monocyte Absolute 1.08 (H) 0.10 - 1.00 x10(3) uL    Eosinophil Absolute 0.22 0.00 - 0.70 x10(3) uL    Basophil Absolute 0.04 0.00 - 0.20 x10(3) uL    Immature Granulocyte Absolute 0.06 0.00 - 1.00 x10(3) uL    Neutrophil % 63.8 %    Lymphocyte % 24.8 %    Monocyte % 8.8 %    Eosinophil % 1.8 %    Basophil % 0.3 %    Immature Granulocyte % 0.5 %       EKG / Spirometry : -     Radiology: CARD ECHO 2D DOPPLER (CPT=93306)    Result Date: 2024  Transthoracic Echocardiogram Name:Nely Saravia Date: 2024 :  1955 Ht:  (65in)  BP: 184 / 81 MRN:  [id number]    Age:  69years    Wt:  (159lb) HR: Loc:  [facility]       Gndr: F          BSA: 1.79m^2 Sonographer: Liane Chase Ordering:    Genna Pizarro Consulting:  Genna Pizarro ----------------------------------------------------------------------------  History/Indications:   Stroke. ---------------------------------------------------------------------------- Procedure information:  A transthoracic complete 2D study was performed. Additional evaluation included M-mode, complete spectral Doppler, and color Doppler.  Patient status:  Inpatient.  Location:  Bedside.    This was a routine study. Transthoracic echocardiography for diagnosis. Image quality was adequate. ECG rhythm:   Normal sinus ---------------------------------------------------------------------------- Conclusions: 1. Left ventricle: The cavity size was normal. Wall thickness was normal.    Systolic function was normal. The estimated ejection fraction was 50-55%,    by biplane method of disks. Doppler parameters are consistent with    abnormal left ventricular relaxation - grade 1 diastolic dysfunction. 2. Left ventricle: There is moderate hypokinesis of the apex. 3. Atrial septum: There was no atrial level shunt. Impressions:  No previous study was available for comparison. * ---------------------------------------------------------------------------- * Findings: Left ventricle:  The cavity size was normal. Wall thickness was normal. Systolic function was normal. The estimated ejection fraction was 50-55%, by biplane method of disks. No diagnostic evidence for diffuse regional wall motion abnormalities. Regional wall motion:   There is moderate hypokinesis of the apex.   Doppler parameters are consistent with abnormal left ventricular relaxation - grade 1 diastolic dysfunction. Left atrium:  The atrium was normal in size. Right ventricle:  The cavity size was normal. Systolic function was normal. Right atrium:  The atrium was normal in size. Mitral valve:  The valve was structurally normal. Leaflet separation was normal.  Doppler:  Transvalvular velocity was within the normal range. There was no evidence for stenosis. There was trivial regurgitation. Aortic valve:   The valve was trileaflet. The  leaflets were mildly calcified. Cusp separation was normal.  Doppler:  Transvalvular velocity was within the normal range. There was no evidence for stenosis. There was no significant regurgitation. Tricuspid valve:  The valve is structurally normal. Leaflet separation was normal.  Doppler:  Transvalvular velocity was within the normal range. There was no evidence for stenosis. There was mild regurgitation. Pulmonic valve:   The valve is structurally normal. Cusp separation was normal.  Doppler:  Transvalvular velocity was within the normal range. There was no evidence for stenosis. There was trivial regurgitation. Pericardium:   There was no pericardial effusion. Aorta: Aortic root: The aortic root was normal-sized. Ascending aorta: The ascending aorta was normal. Pulmonary arteries: The main pulmonary artery was normal-sized. Systemic veins: Inferior vena cava: The IVC was normally collapsible and normal-sized. Atrial septum:  There was no atrial level shunt. ---------------------------------------------------------------------------- Measurements  Left ventricle                    Value        Ref  IVS thickness, ED, PLAX           0.8   cm     0.6 - 0.9  LV ID, ED, PLAX                   4.8   cm     3.8 - 5.2  LV ID, ES, PLAX                   2.9   cm     2.2 - 3.5  LV PW thickness, ED, PLAX         0.8   cm     0.6 - 0.9  IVS/LV PW ratio, ED, PLAX         1.00         ---------  LV PW/LV ID ratio, ED, PLAX       0.17         ---------  LV ejection fraction              70    %      54 - 74  Stroke volume/bsa, 2D             28    ml/m^2 ---------  LV end-diastolic volume, 1-p      103   ml     48 - 140  A4C  LV ejection fraction, 1-p A4C     67    %      46 - 78  Stroke volume, 1-p A4C            69    ml     ---------  LV end-diastolic volume/bsa,      57    ml/m^2 30 - 82  1-p A4C  Stroke volume/bsa, 1-p A4C        39    ml/m^2 ---------  LV end-diastolic volume, 2-p      85    ml     46 - 106  LV  end-systolic volume, 2-p       31    ml     14 - 42  LV ejection fraction, 2-p         64    %      54 - 74  Stroke volume, 2-p                54    ml     ---------  LV end-diastolic volume/bsa,      47    ml/m^2 29 - 61  2-p  LV end-systolic volume/bsa,       17    ml/m^2 8 - 24  2-p  Stroke volume/bsa, 2-p            30.3  ml/m^2 ---------  LV e', lateral                (L) 3.8   cm/sec >=10.0  LV E/e', lateral              (H) 17           <=13  LV e', medial                 (L) 4.5   cm/sec >=7.0  LV E/e', medial                   15           ---------  LV e', average                    4.1   cm/sec ---------  LV E/e', average              (H) 16           <=14  LVOT                              Value        Ref  LVOT ID                           1.8   cm     ---------  LVOT peak velocity, S             0.93  m/sec  ---------  LVOT VTI, S                       20.0  cm     ---------  LVOT peak gradient, S             3     mm Hg  ---------  LVOT mean gradient, S             2     mm Hg  ---------  Stroke volume (SV), LVOT DP       51    ml     ---------  Stroke index (SV/bsa), LVOT       28    ml/m^2 ---------  DP  Aortic root                       Value        Ref  Aortic root ID                    2.6   cm     2.5 - 4.0  Aortic root ID, STJ, ED           2.3   cm     2.0 - 3.2  Ascending aorta                   Value        Ref  Ascending aorta ID                3.3   cm     1.9 - 3.5  Left atrium                       Value        Ref  LA volume, S                  (H) 54    ml     22 - 52  LA volume/bsa, S                  30    ml/m^2 16 - 34  LA volume, ES, 1-p A4C        (H) 53    ml     22 - 52  LA volume, ES, 1-p A2C            52    ml     22 - 52  LA volume, ES, A/L                56    ml     ---------  LA volume/bsa, ES, A/L            31    ml/m^2 16 - 34  Mitral valve                      Value        Ref  Mitral E-wave peak velocity       0.66  m/sec  ---------  Mitral A-wave peak velocity        1.23  m/sec  ---------  Mitral deceleration time          225   ms     ---------  Mitral E/A ratio, peak            0.5          ---------  Pulmonary artery                  Value        Ref  PA pressure, S, DP                35    mm Hg  ---------  Tricuspid valve                   Value        Ref  Tricuspid regurg peak         (H) 2.82  m/sec  <=2.8  velocity  Tricuspid peak RV-RA gradient     32    mm Hg  ---------  Systemic veins                    Value        Ref  Estimated CVP                     3     mm Hg  ---------  Inferior vena cava                Value        Ref  ID                                1.6   cm     <=2.1  Right ventricle                   Value        Ref  TAPSE, 2D                         1.90  cm     >=1.70  TAPSE, MM                         1.86  cm     >=1.70  RV pressure, S, DP                35    mm Hg  ---------  RV s', lateral                    16.5  cm/sec >=9.5 Legend: (L)  and  (H)  vanda values outside specified reference range. ---------------------------------------------------------------------------- Prepared and electronically signed by Perry Mcqueen MD 07/08/2024 15:13     CTA BRAIN + CTA CAROTIDS (CPT=70496/70753)    Addendum Date: 7/8/2024    CORRECTION Corrected on: 7/08/2024;   PROCEDURE: CTA BRAIN + CTA CAROTIDS (CPT=70496/54144)  COMPARISON: [facility], [place], MRI BRAIN WO ACUTE (3) SEQUENCE(CPT=70551), 7/07/2024, 4:32 PM.  [facility], [place], CT BRAIN OR HEAD (CPT=70450), 12/17/2023, 4:25 PM.  INDICATIONS: Hypoglycemia  TECHNIQUE:   CT images of the neck and brain were obtained without and non-ionic intravenous contrast material. Multi-planar reformatted/3-D images were created to optimize visualization of vascular anatomy. Automated exposure control for dose reduction was used. Dose information is transmitted to the ACR (American College of Radiology) NRDR (National Radiology Data Registry) which includes the Dose Index Registry.Evaluation of  internal carotid stenosis is based on NASCET Criteria.    FINDINGS: CT ANGIOGRAPHY OF THE NECK:  CAROTID ARTERIES: RIGHT COMMON CAROTID: No hemodynamically significant stenosis or dissection. RIGHT INTERNAL CAROTID: No hemodynamically significant stenosis or dissection.  LEFT COMMON CAROTID: No hemodynamically significant stenosis or dissection. LEFT INTERNAL CAROTID: No hemodynamically significant stenosis or dissection.  VERTEBRAL ARTERIES: RIGHT VERTEBRAL ARTERY: No hemodynamically significant stenosis or dissection. LEFT VERTEBRAL ARTERY: No hemodynamically significant stenosis or dissection.  CT ANGIOGRAPHY OF THE BRAIN:  ANTERIOR CIRCULATION: DISTAL INTERNAL CAROTIDS: Flow identified. No significant stenosis. ANTERIOR CEREBRALS: Flow identified. No significant stenosis. MIDDLE CEREBRALS: Flow identified. No significant stenosis.  POSTERIOR CIRCULATION: RIGHT VERTEBRAL: Flow identified. No significant stenosis. LEFT VERTEBRAL: Flow identified. No significant stenosis. BASILAR: Flow identified. No significant stenosis. POSTERIOR CEREBRALS: Flow identified. No significant stenosis.  ANEURYSMS: None. VASCULAR MALFORMATIONS: None. OTHER: Negative.     AORTIC ARCH (Limited): No aneurysm or dissection is identified in the imaged volume.  MEDIASTINUM/APICES (Limited): No mass or adenopathy.  OTHER:  There is a 0.5 cm area of low attenuation within the left pontine tonsil.  There is multilevel degenerative change in the cervical spine.   CONCLUSION:   No evidence large vessel intracranial arterial occlusion or proximal flow limiting stenosis.  No evidence of occlusion, hemodynamically significant stenosis or evidence to suggest dissection of the bilateral cervical carotid and vertebral arteries.  Subcentimeter area of low attenuation left palatine tonsil which could reflect a small tonsillar cyst.  Please correlate clinically for any signs of infection as a small abscess is in the differential.  A tonsillar lesion may  be less likely but not entirely excluded and follow up nonemergent ENT evaluation also advised.    Dictated by (CST): Micheal Galeano MD on 7/08/2024 at 11:07 AM     Finalized by (CST): Micheal Galeano MD on 7/08/2024 at 11:23 AM    Dictated by (CST): Micheal Galeano MD on 7/08/2024 at 11:40 AM     Finalized by (CST): Micheal Galeano MD on 7/08/2024 at 11:40 AM              Result Date: 7/8/2024  PROCEDURE: CTA BRAIN + CTA CAROTIDS (CPT=70496/38632)  COMPARISON: [facility], MRI BRAIN WO ACUTE (3) SEQUENCE(CPT=70551), 7/07/2024, 4:32 PM.  [facility], CT BRAIN OR HEAD (CPT=70450), 12/17/2023, 4:25 PM.  INDICATIONS: Hypoglycemia  TECHNIQUE:   CT images of the neck and brain were obtained without and non-ionic intravenous contrast material. Multi-planar reformatted/3-D images were created to optimize visualization of vascular anatomy. Automated exposure control for dose reduction was used. Dose information is transmitted to the ACR (American College of Radiology) NRDR (National Radiology Data Registry) which includes the Dose Index Registry.Evaluation of internal carotid stenosis is based on NASCET Criteria.    FINDINGS: CT ANGIOGRAPHY OF THE NECK:  CAROTID ARTERIES: RIGHT COMMON CAROTID: No hemodynamically significant stenosis or dissection. RIGHT INTERNAL CAROTID: No hemodynamically significant stenosis or dissection.  LEFT COMMON CAROTID: No hemodynamically significant stenosis or dissection. LEFT INTERNAL CAROTID: No hemodynamically significant stenosis or dissection.  VERTEBRAL ARTERIES: RIGHT VERTEBRAL ARTERY: No hemodynamically significant stenosis or dissection. LEFT VERTEBRAL ARTERY: No hemodynamically significant stenosis or dissection.  CT ANGIOGRAPHY OF THE BRAIN:  ANTERIOR CIRCULATION: DISTAL INTERNAL CAROTIDS: Flow identified. No significant stenosis. ANTERIOR CEREBRALS: Flow identified. No significant stenosis. MIDDLE CEREBRALS: Flow identified. No significant  stenosis.  POSTERIOR CIRCULATION: RIGHT VERTEBRAL: Flow identified. No significant stenosis. LEFT VERTEBRAL: Flow identified. No significant stenosis. BASILAR: Flow identified. No significant stenosis. POSTERIOR CEREBRALS: Flow identified. No significant stenosis.  ANEURYSMS: None. VASCULAR MALFORMATIONS: None. OTHER: Negative.     AORTIC ARCH (Limited): No aneurysm or dissection is identified in the imaged volume.  MEDIASTINUM/APICES (Limited): No mass or adenopathy.  OTHER:  There is a 0.5 cm area of low attenuation within the left pontine tonsil.  There is multilevel degenerative change in the cervical spine.          CONCLUSION:   No evidence large vessel intracranial arterial occlusion or proximal flow limiting stenosis.  No evidence of occlusion, hemodynamically significant stenosis or evidence to suggest dissection of the bilateral cervical carotid and vertebral arteries.  Subcentimeter area of low attenuation left palatine tonsil which could reflect a small tonsillar cyst.  Please correlate clinically for any signs of infection is a small abscess is in the differential.  Underlying tonsil lesion may be less likely but not  entirely excluded follow up nonemergent anti evaluation also advised.    Dictated by (CST): Micheal Galeano MD on 7/08/2024 at 11:07 AM     Finalized by (CST): Micheal Galeano MD on 7/08/2024 at 11:23 AM          MRI BRAIN WO ACUTE (3) SEQUENCE (CPT=70551)    Result Date: 7/7/2024  PROCEDURE: MRI BRAIN WO ACUTE (3) SEQUENCE(CPT=70551)  COMPARISON: [facility], CT BRAIN OR HEAD (CPT=70450), 12/17/2023, 4:25 PM.  INDICATIONS: Hyperglycemia  TECHNIQUE: A variety of imaging planes and parameters were utilized for visualization of suspected pathology.  Images were performed without contrast.  FINDINGS:   Punctate acute infarct involving the periventricular white matter along the left aspect of 4th ventricle (5:36).  There is restricted diffusion and T2/FLAIR hyperintensity in  this region.  There are scattered foci and patchy areas of T2/FLAIR hyperintensity involving the periventricular and subcortical white matter as well as the naty, which is compatible with mild chronic microvascular ischemic changes.  No acute intracranial hemorrhage or extra-axial fluid collection.  No abnormal parenchymal gradient susceptibility artifact.  There is mild global parenchymal volume loss with prominence of the extra-axial spaces and ventricular system.  No hydrocephalus.  There is no midline shift or mass effect.  The basal cisterns are intact.  No suspicious marrow replacing lesion the calvarium, skull base, or upper cervical spine.  There is minimal ethmoid sinus mucosal thickening.  There is leftward deviation of the nasal septum.  The mastoid air cells are well aerated.          CONCLUSION:   Punctate acute infarct involving the periventricular white matter along the left aspect of the 4th ventricle.  Mild chronic microvascular ischemic changes.  Mild global parenchymal volume loss.  Lesser incidental findings described above.    Dictated by (CST): Kamran Irving MD on 7/07/2024 at 5:56 PM     Finalized by (CST): Kamran Irving MD on 7/07/2024 at 6:02 PM             ASSESSMENT/PLAN:   Assessment   Encounter Diagnoses   Name Primary?    Acute CVA (cerebrovascular accident) (HCC) Yes    Uncontrolled type 2 diabetes mellitus with hyperglycemia (HCC)     Primary hypertension     Pure hypercholesterolemia          MEDICATIONS:    lisinopril 40 MG Oral Tab, Take 0.5 tablets (20 mg total) by mouth daily., Disp: 30 tablet, Rfl: 1    aspirin 81 MG Oral Tab EC, Take 1 tablet (81 mg total) by mouth daily., Disp: 30 tablet, Rfl: 1    clopidogrel 75 MG Oral Tab, Take 1 tablet (75 mg total) by mouth daily for 21 days., Disp: 21 tablet, Rfl: 0    metoprolol succinate ER 25 MG Oral Tablet 24 Hr, Take 1 tablet (25 mg total) by mouth Daily Beta Blocker., Disp: 30 tablet, Rfl: 1    atorvastatin 40 MG Oral Tab, Take  2 tablets (80 mg total) by mouth every morning., Disp: 90 tablet, Rfl: 3    Meloxicam 7.5 MG Oral Tab, Take 1 tablet (7.5 mg total) by mouth daily., Disp: 30 tablet, Rfl: 1    Glucose Blood (ONETOUCH ULTRA) In Vitro Strip, To check blood sugars up to twice per day, Disp: 100 strip, Rfl: 2    Blood Glucose Monitoring Suppl (ONETOUCH ULTRA MINI) w/Device Does not apply Kit, To use as directed., Disp: 1 kit, Rfl: 0    amLODIPine 10 MG Oral Tab, Take 1 tablet (10 mg total) by mouth every morning., Disp: 90 tablet, Rfl: 3    Insulin NPH & Regular (NOVOLIN 70/30 FLEXPEN RELION) (70-30) 100 UNIT/ML Subcutaneous Suspension Pen-injector, Inject 12 Units into the skin in the morning and 12 Units before bedtime., Disp: 1 each, Rfl: 2    traMADol 50 MG Oral Tab, Take 1 tablet (50 mg total) by mouth every 6 (six) hours as needed for Pain. No alcohol or driving on this med. Stop if lethargic or hallucinating., Disp: 20 tablet, Rfl: 0    neomycin-polymyxin-hydrocortisone 3.5-75320-1 Otic Solution, Place 3 drops into the right ear 3 (three) times daily., Disp: 10 mL, Rfl: 0    ondansetron (ZOFRAN) 4 mg tablet, Take 1 tablet (4 mg total) by mouth every 8 (eight) hours as needed for Nausea., Disp: 20 tablet, Rfl: 0           LABORATORY & ORDERS:   Orders Placed This Encounter   Procedures    Vitamin B12 [E]     REFERRALS: PHYSICAL THERAPY - INTERNAL  US CAROTID DOPPLER BILAT - DIAG IMG (CPT=93880),       Procedures    Vitamin B12 [E]    PHYSICAL THERAPY - INTERNAL    US CAROTID DOPPLER BILAT - DIAG IMG (CPT=93880)     Complexity of today's visit include:  (material ordered by another provider)    *Reviewed - lab:                      - Radiology reports:                    - Specialist report/notes/consultations:        RECOMMENDATIONS given include: Patient was reassured of  her medical condition and all questions and concerns were answered. Patient was informed to please, call our office with any new or further questions or  concerns that may come up in the near future. Notify Dr Chaudhari or the [facility] if there is a deterioration or worsening of the medical condition. Also, inform the doctor with any new symptoms or medications' side effects.  Tight diabetes control    FOLLOW-UP: Schedule a follow-up visit in 2 weeks with her PCP.       Orders This Visit:  Orders Placed This Encounter   Procedures    Vitamin B12 [E]       Meds This Visit:  Requested Prescriptions      No prescriptions requested or ordered in this encounter       Imaging & Referrals:  PHYSICAL THERAPY - INTERNAL  US CAROTID DOPPLER BILAT - DIAG IMG (CPT=93880)         MARY CHAUDHARI MD

## 2024-07-11 ENCOUNTER — TELEPHONE (OUTPATIENT)
Dept: FAMILY MEDICINE CLINIC | Facility: CLINIC | Age: 69
End: 2024-07-11

## 2024-07-11 NOTE — TELEPHONE ENCOUNTER
Charlotte from [facility] wanted to know if Bryson Lombardi will be signing orders. If so, please fax over office notes to [phone]. Ok to leave verbal. Please advise

## 2024-07-11 NOTE — TELEPHONE ENCOUNTER
Patient states someone from our office called her  regarding an appointment.  Patient states she had an office visit with Dr. Massey 7/10. Noted TCM follow up.  Patient was to schedule 2 week follow up with primary care provider.  Patient states she already has an appointment with Dr. Massey on 7/24 at 10am  Chart reviewed. Informed Patient that appointment has been canceled as she is to follow up with Dr. Méndez.  No upcoming appointment scheduled.  Patient states no one called her that appointment has been canceled.    Assisted in scheduling with Dr. Méndez 7/24/24.  Future Appointments   Date Time Provider Department Center   7/15/2024  5:00 PM [facility] RM5 [facility] EM [facility]   7/24/2024  9:20 AM [facility]   8/16/2024  1:30 PM [facility]

## 2024-07-11 NOTE — PROGRESS NOTES
Left a detailed message to cell (ok per release of information) regarding below message. Advised to call back if she has any other questions or  concern.

## 2024-07-12 NOTE — PAYOR COMM NOTE
--------------CLINICAL FOR RECONSIDERATION OF DENIAL      CONTINUED STAY REVIEW    Payor: [employer]  Subscriber #:  [id number]  Authorization Number: [id number]    Admit date: 7/7/24  Admit time:  8:40 PM    REVIEW DOCUMENTATION:      MRI BRAIN WO ACUTE     CONCLUSION:      Punctate acute infarct involving the periventricular white matter along the left aspect of the 4th ventricle.      Mild chronic microvascular ischemic changes.      Mild global parenchymal volume loss.       admitted to the hospital for acute onset dizziness.  Patient woke up with symptoms on Saturday morning, started to feel extreme room spinning dizziness, nausea, and vomiting.  Came to ED yesterday and had MRI showing small infarct in the left periaqueductal gray.     Coordination:  ?dysmetria RUE with finger to nose, normal on left      Assessment  Nely Saravia is a 69 year old female, who presents for acute onset vertigo and vomiting.  Found to have acute infarct just adjacent to the fourth ventricle on the left.  This location would explain her symptoms.  Size and location of infarct are suggestive of a lacunar stroke related to uncontrolled diabetes, hypertension, hyperlipidemia.      1. Cerebrovascular accident (CVA), unspecified mechanism (HCC)  -Likely small vessel in origin  -CT angio head and neck showed no LVO  -TTE was also normal  -Will treat with aspirin 81mg + Plavix for 3 weeks, then stop Plavix and continue aspirin monotherapy thereafter  -LDL elevated, continue atorvastatin 80  -A1c elevated, and work on aggressive glycemic control  -PT, OT and speech therapy   -Can start treating blood pressure with no restriction, goal normotension.  Patient is now greater than 24 hours after stroke onset.

## 2024-07-12 NOTE — TELEPHONE ENCOUNTER
Dr. Méndez stated he can sign the orders. Dr. Méndez is out of the office today and will be returning Tuesday. The orders will need to be faxed to [phone].

## 2024-07-15 ENCOUNTER — TELEPHONE (OUTPATIENT)
Dept: FAMILY MEDICINE CLINIC | Facility: CLINIC | Age: 69
End: 2024-07-15

## 2024-07-15 ENCOUNTER — HOSPITAL ENCOUNTER (OUTPATIENT)
Dept: ULTRASOUND IMAGING | Facility: HOSPITAL | Age: 69
Discharge: HOME | End: 2024-07-15
Attending: FAMILY MEDICINE
Payer: MEDICARE

## 2024-07-15 DIAGNOSIS — I63.9 ACUTE CVA (CEREBROVASCULAR ACCIDENT) (HCC): ICD-10-CM

## 2024-07-15 PROCEDURE — 93880 EXTRACRANIAL BILAT STUDY: CPT | Performed by: FAMILY MEDICINE

## 2024-07-15 NOTE — TELEPHONE ENCOUNTER
Per Michelet/nurse would like to know if doctor will sigh Home health order, need also copy of the Last Office Visit fax to [phone].

## 2024-07-15 NOTE — TELEPHONE ENCOUNTER
Message noted; can sign order but last seen in office by Dr Khai Massey. Can send office notes from that.

## 2024-07-24 ENCOUNTER — OFFICE VISIT (OUTPATIENT)
Dept: FAMILY MEDICINE CLINIC | Facility: CLINIC | Age: 69
End: 2024-07-24

## 2024-07-24 VITALS
SYSTOLIC BLOOD PRESSURE: 123 MMHG | HEIGHT: 65 IN | RESPIRATION RATE: 18 BRPM | BODY MASS INDEX: 27.66 KG/M2 | HEART RATE: 68 BPM | WEIGHT: 166 LBS | DIASTOLIC BLOOD PRESSURE: 72 MMHG | TEMPERATURE: 98 F

## 2024-07-24 DIAGNOSIS — E11.65 UNCONTROLLED TYPE 2 DIABETES MELLITUS WITH HYPERGLYCEMIA (HCC): ICD-10-CM

## 2024-07-24 DIAGNOSIS — E78.5 HYPERLIPIDEMIA, UNSPECIFIED HYPERLIPIDEMIA TYPE: ICD-10-CM

## 2024-07-24 DIAGNOSIS — I63.9 ACUTE CVA (CEREBROVASCULAR ACCIDENT) (HCC): Primary | ICD-10-CM

## 2024-07-24 DIAGNOSIS — I10 ESSENTIAL HYPERTENSION: ICD-10-CM

## 2024-07-24 PROCEDURE — 99214 OFFICE O/P EST MOD 30 MIN: CPT | Performed by: FAMILY MEDICINE

## 2024-07-24 NOTE — PROGRESS NOTES
Subjective:   Patient ID: Nely Saravia is a 69 year old female.    Patient is here for follow up from recent hospitalization. Had seen Dr Massey for initial TCM follow up. The patient was admitted for CVA. The patient states symptoms have improved.   Pt is working with PT and doing well and gait/ balance has been better.   Pt has hx of diabetes and hgba1c was elevated during hospital stay.   Pt would like to see dietician also as well as follow up with endocrine.   Pt had been inconsistent with taking medications in past. Currently on insulin.         History/Other:   Review of Systems   Constitutional:  Negative for fever.   Respiratory:  Negative for shortness of breath.    Cardiovascular:  Negative for chest pain.   Neurological:  Negative for dizziness, syncope, speech difficulty, light-headedness and numbness.   Hematological:  Does not bruise/bleed easily.   Psychiatric/Behavioral:  Negative for dysphoric mood. The patient is not nervous/anxious.      Current Outpatient Medications   Medication Sig Dispense Refill    lisinopril 40 MG Oral Tab Take 0.5 tablets (20 mg total) by mouth daily. 30 tablet 1    aspirin 81 MG Oral Tab EC Take 1 tablet (81 mg total) by mouth daily. 30 tablet 1    clopidogrel 75 MG Oral Tab Take 1 tablet (75 mg total) by mouth daily for 21 days. 21 tablet 0    metoprolol succinate ER 25 MG Oral Tablet 24 Hr Take 1 tablet (25 mg total) by mouth Daily Beta Blocker. 30 tablet 1    atorvastatin 40 MG Oral Tab Take 2 tablets (80 mg total) by mouth every morning. 90 tablet 3    Meloxicam 7.5 MG Oral Tab Take 1 tablet (7.5 mg total) by mouth daily. 30 tablet 1    Glucose Blood (ONETOUCH ULTRA) In Vitro Strip To check blood sugars up to twice per day 100 strip 2    Blood Glucose Monitoring Suppl (ONETOUCH ULTRA MINI) w/Device Does not apply Kit To use as directed. 1 kit 0    amLODIPine 10 MG Oral Tab Take 1 tablet (10 mg total) by mouth every morning. 90 tablet 3    Insulin NPH & Regular  (NOVOLIN 70/30 FLEXPEN RELION) (70-30) 100 UNIT/ML Subcutaneous Suspension Pen-injector Inject 12 Units into the skin in the morning and 12 Units before bedtime. 1 each 2    traMADol 50 MG Oral Tab Take 1 tablet (50 mg total) by mouth every 6 (six) hours as needed for Pain. No alcohol or driving on this med. Stop if lethargic or hallucinating. 20 tablet 0    neomycin-polymyxin-hydrocortisone 3.5-14654-4 Otic Solution Place 3 drops into the right ear 3 (three) times daily. 10 mL 0    ondansetron (ZOFRAN) 4 mg tablet Take 1 tablet (4 mg total) by mouth every 8 (eight) hours as needed for Nausea. 20 tablet 0     Allergies:No Known Allergies    Objective:   Physical Exam  Constitutional:       Appearance: Normal appearance.   Cardiovascular:      Rate and Rhythm: Normal rate and regular rhythm.      Pulses: Normal pulses.      Heart sounds: Normal heart sounds.   Pulmonary:      Effort: Pulmonary effort is normal.      Breath sounds: Normal breath sounds.   Abdominal:      General: Abdomen is flat.   Neurological:      Mental Status: She is alert and oriented to person, place, and time.      Cranial Nerves: No cranial nerve deficit.      Sensory: No sensory deficit.      Comments: Walking with help of cane   Psychiatric:         Mood and Affect: Mood normal.         Behavior: Behavior normal.         Assessment & Plan:   1. Acute CVA (cerebrovascular accident): improving/ reviewed discharge notes and Dr Massey visit notes:  - After discussion with patient, to monitor for symptoms and call if any significant symptoms; follow up with neurology as scheduled. Stable, continue present management with PT/ OT and also medications as reviewed for now.     2. Uncontrolled type 2 diabetes mellitus with hyperglycemia:  - After discussion, will send to endocrine and diabetic education center for further evaluation and treatment; To call if any significant symptoms. Stable, continue present management.       3. Essential  hypertension: improved  - Stable: medications reviewed and renewed; To continue present treatment; To call if problems; Routine follow up in 3-6 months.     4. Hyperlipidemia, unspecified hyperlipidemia type:  - Stable: medication reviewed and renewed; To continue present treatment; To call if problems; Routine follow up in 6-12 months.         No orders of the defined types were placed in this encounter.      Meds This Visit:  Requested Prescriptions      No prescriptions requested or ordered in this encounter       Imaging & Referrals:  NEURO - INTERNAL  ENDOCRINOLOGY - INTERNAL  DIABETIC EDUCATION - INTERNAL

## 2024-07-25 ENCOUNTER — OFFICE VISIT (OUTPATIENT)
Dept: NEUROLOGY | Facility: CLINIC | Age: 69
End: 2024-07-25
Payer: MEDICARE

## 2024-07-25 ENCOUNTER — MED REC SCAN ONLY (OUTPATIENT)
Dept: FAMILY MEDICINE CLINIC | Facility: CLINIC | Age: 69
End: 2024-07-25

## 2024-07-25 VITALS — SYSTOLIC BLOOD PRESSURE: 110 MMHG | DIASTOLIC BLOOD PRESSURE: 69 MMHG | HEART RATE: 70 BPM | OXYGEN SATURATION: 99 %

## 2024-07-25 DIAGNOSIS — E11.21 TYPE 2 DIABETES MELLITUS WITH DIABETIC NEPHROPATHY, UNSPECIFIED WHETHER LONG TERM INSULIN USE (HCC): ICD-10-CM

## 2024-07-25 DIAGNOSIS — I63.9 ISCHEMIC STROKE (HCC): Primary | ICD-10-CM

## 2024-07-25 PROCEDURE — 99214 OFFICE O/P EST MOD 30 MIN: CPT | Performed by: INTERNAL MEDICINE

## 2024-07-25 NOTE — PROGRESS NOTES
[facility] [address], [address]  [facility] [address]  [phone]            Neurology Initial Visit     Referred By: Dr. Ly ref. provider found    Chief Complaint:   Chief Complaint   Patient presents with    Hospital F/U     Pt presents today for HFU. Pt presented to ED on 7/7 for dizziness, nausea/vomiting, & vertigo symptoms. She was evaluated & found to have CVA. Symptoms have all resolved. She reports getting headaches occasionally to top portion of her head. She describes them as throbbing. She takes advil for relief. Denies blurry vision, but states not seeing like seeing normal. Denies double vision. She is not driving. She now uses a walker for ambulation. Gait a bit unsteady. Hx:Knee problems. She has PT at home.        HPI:     Nely Saravia is a 69 year old female with history of diabetes, hypertension, hyperlipidemia, who presents for posthospital follow-up for stroke.  Was admitted on 7/7 for acute onset dizziness, nausea, vomiting.  Found to have acute infarct in the left periaqueductal gray.  Has been doing well since discharge.  Still taking aspirin and Plavix.  No longer having any dizziness at all, but just feels that her gait is occasionally off balance.  Walking with a walker which is new since the stroke.  Uses it more for longer distances, can get around a little bit at home without it.  Has not had any falls since discharge.  Getting home care services including physical therapy which has been very helpful.  Not driving since the stroke.    Past Medical History:    Diabetes (HCC)    High blood pressure    Hyperlipidemia       Past Surgical History:   Procedure Laterality Date    Hysterectomy         Social history:  History   Smoking Status    Never   Smokeless Tobacco    Never     History   Alcohol Use Never     History   Drug Use Unknown       Family History   Problem Relation Age of Onset    Diabetes Father     Hypertension Father      Hypertension Mother          Current Outpatient Medications:     lisinopril 40 MG Oral Tab, Take 0.5 tablets (20 mg total) by mouth daily., Disp: 30 tablet, Rfl: 1    aspirin 81 MG Oral Tab EC, Take 1 tablet (81 mg total) by mouth daily., Disp: 30 tablet, Rfl: 1    clopidogrel 75 MG Oral Tab, Take 1 tablet (75 mg total) by mouth daily for 21 days., Disp: 21 tablet, Rfl: 0    metoprolol succinate ER 25 MG Oral Tablet 24 Hr, Take 1 tablet (25 mg total) by mouth Daily Beta Blocker., Disp: 30 tablet, Rfl: 1    atorvastatin 40 MG Oral Tab, Take 2 tablets (80 mg total) by mouth every morning., Disp: 90 tablet, Rfl: 3    Meloxicam 7.5 MG Oral Tab, Take 1 tablet (7.5 mg total) by mouth daily., Disp: 30 tablet, Rfl: 1    Glucose Blood (ONETOUCH ULTRA) In Vitro Strip, To check blood sugars up to twice per day, Disp: 100 strip, Rfl: 2    Blood Glucose Monitoring Suppl (ONETOUCH ULTRA MINI) w/Device Does not apply Kit, To use as directed., Disp: 1 kit, Rfl: 0    amLODIPine 10 MG Oral Tab, Take 1 tablet (10 mg total) by mouth every morning., Disp: 90 tablet, Rfl: 3    Insulin NPH & Regular (NOVOLIN 70/30 FLEXPEN RELION) (70-30) 100 UNIT/ML Subcutaneous Suspension Pen-injector, Inject 12 Units into the skin in the morning and 12 Units before bedtime., Disp: 1 each, Rfl: 2    traMADol 50 MG Oral Tab, Take 1 tablet (50 mg total) by mouth every 6 (six) hours as needed for Pain. No alcohol or driving on this med. Stop if lethargic or hallucinating. (Patient not taking: Reported on 7/25/2024), Disp: 20 tablet, Rfl: 0    neomycin-polymyxin-hydrocortisone 3.5-56059-8 Otic Solution, Place 3 drops into the right ear 3 (three) times daily. (Patient not taking: Reported on 7/25/2024), Disp: 10 mL, Rfl: 0    ondansetron (ZOFRAN) 4 mg tablet, Take 1 tablet (4 mg total) by mouth every 8 (eight) hours as needed for Nausea. (Patient not taking: Reported on 7/25/2024), Disp: 20 tablet, Rfl: 0    No Known Allergies    ROS:   As in HPI, the rest of  the 14 system review was done and was negative      Physical Exam:  Vitals:    07/25/24 1001   BP: 110/69   Pulse: 70   SpO2: 99%       General: No apparent distress, well nourished, well groomed.  Head- Normocephalic, atraumatic  Eyes- No redness or swelling    Neurological:   Mental Status:  Mental Status- Alert, conversant, speech fluent, following all commands, Fund of knowledge appropriate for education and age, and Thought process intact    Cranial Nerves:  II.- Visual fields full to confrontation, III, IV, VI- EOM intact, PERRL, V. Facial sensation intact, VII. Face symmetric, no facial weakness, and XII. Tongue is midline    Motor Exam:  Strength- upper extremities 5/5 proximally and distally                - lower  extremities 5/5 proximally and distally    Sensory Exam:  Pinprick- intact in all 4 extremities    Deep Tendon Reflexes:  1+ bilateral uppers, absent bilateral lowers     Coordination:  No dysmetria with finger to nose and heel-to-shin bilaterally    Gait:  Able to walk a few steps in the exam room unassisted, right leg appears stiff, with wide-based gait    Labs:    No results found for: \"TSH\"  Lab Results   Component Value Date    HDL 44 07/08/2024     (H) 07/08/2024    TRIG 117 07/08/2024     Lab Results   Component Value Date    HGB 14.5 07/09/2024    HCT 41.7 07/09/2024    MCV 89.3 07/09/2024    WBC 10.5 07/09/2024    .0 07/09/2024      Lab Results   Component Value Date    BUN 8 (L) 07/09/2024    CA 9.1 07/09/2024    ALT 14 07/07/2024    AST 19 07/07/2024    ALB 4.2 07/07/2024     07/09/2024    K 4.2 07/09/2024     07/09/2024    CO2 26.0 07/09/2024      I have reviewed labs. A1c 14 7/7/24    Imaging Studies:  CTA showed no LVO  MRI showed small punctate acute infarct in the left periaqueductal gray        Assessment   Nely Saravia is a 69 year old female with history of diabetes, hyperlipidemia, hypertension, who presents for posthospital follow-up of acute  left periaqueductal gray stroke, presenting with intractable nausea, dizziness, and vomiting.  Doing much better since discharge with no recurrence of dizziness.  Gait is still little bit off balance compared to baseline, requiring walker for longer distances.  .  1. Ischemic stroke (HCC)  -On dual antiplatelet right now, stop Plavix on Sunday, 7/28/2024.  -Continue aspirin 81 mg daily indefinitely  -Continue atorvastatin 40 mg daily  -Continue aggressive glycemic control, blood pressure management  -She has had very good recovery and had a very mild stroke, so overall feel comfortable with her returning to driving without eval.  However, if she and her family are more comfortable having the driving evaluation done, can seek that assessment. Occupational Therapy Referral -placed for driving eval, external  -Call 911 if acute stroke symptoms occur      STROKE RISK FACTORS:   *Hypertension - Target blood pressure <140/90, <130/85 for high risk, normal 120/80  *Physical inactivity - target exercise at least 3 times per week  *Obesity - target ideal body weight and girth <35\" for women, <40\" for men  *Diabetes - Target <6.5-7%   *Smoking - Target smoking cessation  *Hyperlipidemia - Target total cholesterol < 200, Target LDL <100, < 70 for high risk, Target HDL >45 for men, >55 for women, Target triglycerides <150       Education and counseling provided to patient. Instructed patient to call my office or seek medical attention immediately if symptoms worsen.  Patient verbalized understanding of information given. All questions were answered. All side effects of drugs were discussed.     Time spent for examination, counseling and coordination of care such as potential treatment options- 30 minutes with more than 50% of the time spent counseling the patient.    Return to clinic in: Return in about 4 months (around 11/25/2024).    Genna Pizarro MD

## 2024-08-01 ENCOUNTER — TELEPHONE (OUTPATIENT)
Dept: ENDOCRINOLOGY CLINIC | Facility: CLINIC | Age: 69
End: 2024-08-01

## 2024-08-01 ENCOUNTER — MED REC SCAN ONLY (OUTPATIENT)
Dept: FAMILY MEDICINE CLINIC | Facility: CLINIC | Age: 69
End: 2024-08-01

## 2024-08-01 NOTE — TELEPHONE ENCOUNTER
Left voice message for pt to call back. Called twice.     Sent [facility] message offering first available appointment.

## 2024-08-01 NOTE — TELEPHONE ENCOUNTER
Ton -- pt was discharged recently on 7/9, admitted on 7/7 for hyperglycemia and acute CVA. You have cancellation on 8/5 at 10:45 am ([facility]) or res 24 on 8/20 at 1:45 pm ([facility]). Ok to offer? Pt was last seen on 3/2/22.     Component      Latest Ref Rng 7/7/2024   HEMOGLOBIN A1c      <5.7 % 14.7 (H)    ESTIMATED AVERAGE GLUCOSE      68 - 126 mg/dL 375 (H)

## 2024-08-01 NOTE — TELEPHONE ENCOUNTER
Yes please offer either apt. Ideally she should try to come in 8/5 give severe hyperglycemia.        Thank you!

## 2024-08-01 NOTE — TELEPHONE ENCOUNTER
Patient states she was recently released from the hospital and needs to be seen for diabetes. The schedule is booked please call

## 2024-08-05 ENCOUNTER — OFFICE VISIT (OUTPATIENT)
Dept: ENDOCRINOLOGY CLINIC | Facility: CLINIC | Age: 69
End: 2024-08-05
Payer: MEDICARE

## 2024-08-05 VITALS
WEIGHT: 167.13 LBS | HEIGHT: 65 IN | SYSTOLIC BLOOD PRESSURE: 124 MMHG | DIASTOLIC BLOOD PRESSURE: 77 MMHG | BODY MASS INDEX: 27.85 KG/M2 | HEART RATE: 76 BPM

## 2024-08-05 DIAGNOSIS — E11.65 TYPE 2 DIABETES MELLITUS WITH HYPERGLYCEMIA, WITH LONG-TERM CURRENT USE OF INSULIN (HCC): Primary | ICD-10-CM

## 2024-08-05 DIAGNOSIS — Z79.4 TYPE 2 DIABETES MELLITUS WITH HYPERGLYCEMIA, WITH LONG-TERM CURRENT USE OF INSULIN (HCC): Primary | ICD-10-CM

## 2024-08-05 LAB
GLUCOSE BLOOD: 244
TEST STRIP LOT #: NORMAL NUMERIC

## 2024-08-05 PROCEDURE — 99214 OFFICE O/P EST MOD 30 MIN: CPT | Performed by: NURSE PRACTITIONER

## 2024-08-05 PROCEDURE — 82947 ASSAY GLUCOSE BLOOD QUANT: CPT | Performed by: NURSE PRACTITIONER

## 2024-08-05 RX ORDER — INSULIN GLARGINE 100 [IU]/ML
20 INJECTION, SOLUTION SUBCUTANEOUS EVERY MORNING
Qty: 18 ML | Refills: 0 | Status: SHIPPED | OUTPATIENT
Start: 2024-08-05 | End: 2024-11-03

## 2024-08-05 RX ORDER — INSULIN ASPART 100 [IU]/ML
4 INJECTION, SOLUTION INTRAVENOUS; SUBCUTANEOUS
Qty: 12 ML | Refills: 0 | Status: SHIPPED | OUTPATIENT
Start: 2024-08-05

## 2024-08-05 NOTE — PROGRESS NOTES
Name: Nely Saravia  Date: 8/5/2024    CHIEF COMPLAINT   Chief Complaint   Patient presents with    Diabetes     Pt is here for follow up for diabetes an A1c check      HISTORY OF PRESENT ILLNESS   Nely Saravia is a 69 year old female who presents for follow up on diabetes management. She has lost to follow up since 3/2022.   Hgb A1C: 14.7% on 7/7/2024. Previously was 9.8% on 3/13/2024.   Blood glucose is: 244 in clinic today.  Ate eggs and 1 slice of toast (white) around 8am.   Patient notes that she has been feeling fair. She was hospitalized from 7/7 to 7/9 with acute CVA. During this hospitalization it was also identified that she had severe hyperglycemia with A1C per above.     FAMILY HISTORY OF DIABETES  -sister and father - T2DM  DIABETES HISTORY  Diagnosed: around 10 years ago   Prior HbA, C or glycohemoglobin were 13.8% on 9/24/2021; 12.4% 10/20/2021;  7.4% 12/1/2021; 8.0% 3/2022; 9.8% 3/2024; 14.7% 7/7/2024;     Patient has had hospitalizations for blood sugar issues and denies any history of pancreatitis    Hospitalized 10/1/21 - with DKA    PREVIOUS MEDICATION FOR DM:  Metformin- D/c'ed GI s/e   Glimepiride  - d/c'ed due to ineffectiveness   Pioglitazone - d/c'ed due to ineffectiveness    CURRENT MEDICATIONS FOR DM:  Novolin 70/30: 12 units with breakfast - has increase dose to 20 units around 2 weeks ago     28 units with dinner -- has been taking 20 units after dinner meal     HOME GLUCOSE READINGS:   Testing BG x 3 weekly   Meter or log book today: no   Fasting: 160-170s   Before dinner: 300s  Hypoglycemia present: no     HISTORY OF DIABETES COMPLICATIONS:  History of Retinopathy: no - last eye exam within the last 12 months: no   History of Neuropathy: no   History of Nephropathy: no     ASSOCIATED COMPLICATIONS:   HTN: yes   Hyperlipidemia: yes   Cardiovascular Disease: no   Peripheral Vascular Disease: no     DIETARY COMPLIANCE:  Denies dietary changes; continues to work on low carb diet      EXERCISE:   No- however stays very active with grandchildren (5-12 year olds).    - since hospitalization she has been getting around with walker for additional stabilization.     Polyuria, polyphagia, polydipsia: yes --intermittent polyuria overnight   Paresthesias: no   Blurred vision: no   Recent steroids, illness or infections: no     REVIEW OF SYSTEMS  Constitutional: Negative for: weight change, fever, fatigue, cold/heat intolerance  Eyes: Negative for:  Visual changes, proptosis, blurring  ENT: Negative for:  dysphagia, neck swelling, dysphonia  Respiratory: Negative for: hemoptysis, shortness of breath, cough, or dyspnea.  Cardiovascular: Negative for:  chest pain, chest discomfort, palpitations  GI: Negative for:  abdominal pain, nausea, vomiting, diarrhea, heartburn, constipation  Neurology: Negative for: headache, dizziness, syncope, numbness/tingling, or weakness.   Genito-Urinary: Negative for: dysuria, frequency or hematuria   Hematology/Lymphatics: Negative for: bruising, easy bleeding, lower extremity edema  Skin: Negative for: rash, blister, infection or ulcers.  Endocrine: Negative for: polyuria, polydipsia. No osteoporosis. No thyroid disease.     MEDICATIONS:     Current Outpatient Medications:     lisinopril 40 MG Oral Tab, Take 0.5 tablets (20 mg total) by mouth daily., Disp: 30 tablet, Rfl: 1    aspirin 81 MG Oral Tab EC, Take 1 tablet (81 mg total) by mouth daily., Disp: 30 tablet, Rfl: 1    metoprolol succinate ER 25 MG Oral Tablet 24 Hr, Take 1 tablet (25 mg total) by mouth Daily Beta Blocker., Disp: 30 tablet, Rfl: 1    atorvastatin 40 MG Oral Tab, Take 2 tablets (80 mg total) by mouth every morning., Disp: 90 tablet, Rfl: 3    Meloxicam 7.5 MG Oral Tab, Take 1 tablet (7.5 mg total) by mouth daily., Disp: 30 tablet, Rfl: 1    Glucose Blood (ONETOUCH ULTRA) In Vitro Strip, To check blood sugars up to twice per day, Disp: 100 strip, Rfl: 2    Blood Glucose Monitoring Suppl (ONETOUCH  ULTRA MINI) w/Device Does not apply Kit, To use as directed., Disp: 1 kit, Rfl: 0    amLODIPine 10 MG Oral Tab, Take 1 tablet (10 mg total) by mouth every morning., Disp: 90 tablet, Rfl: 3    Insulin NPH & Regular (NOVOLIN 70/30 FLEXPEN RELION) (70-30) 100 UNIT/ML Subcutaneous Suspension Pen-injector, Inject 12 Units into the skin in the morning and 12 Units before bedtime., Disp: 1 each, Rfl: 2    traMADol 50 MG Oral Tab, Take 1 tablet (50 mg total) by mouth every 6 (six) hours as needed for Pain. No alcohol or driving on this med. Stop if lethargic or hallucinating. (Patient not taking: Reported on 7/25/2024), Disp: 20 tablet, Rfl: 0    neomycin-polymyxin-hydrocortisone 3.5-91930-3 Otic Solution, Place 3 drops into the right ear 3 (three) times daily. (Patient not taking: Reported on 7/25/2024), Disp: 10 mL, Rfl: 0    ondansetron (ZOFRAN) 4 mg tablet, Take 1 tablet (4 mg total) by mouth every 8 (eight) hours as needed for Nausea. (Patient not taking: Reported on 7/25/2024), Disp: 20 tablet, Rfl: 0    ALLERGIES:   No Known Allergies    SOCIAL HISTORY:   Social History     Socioeconomic History    Marital status:    Tobacco Use    Smoking status: Never    Smokeless tobacco: Never   Vaping Use    Vaping status: Never Used   Substance and Sexual Activity    Alcohol use: Never    Drug use: Never       PAST MEDICAL HISTORY:   Past Medical History:    Diabetes (HCC)    High blood pressure    Hyperlipidemia       PAST SURGICAL HISTORY:   Past Surgical History:   Procedure Laterality Date    Hysterectomy         PHYSICAL EXAM:   Vitals:    08/05/24 1047   BP: 124/77   Pulse: 76   Weight: 167 lb 1.6 oz (75.8 kg)   Height: 5' 5\" (1.651 m)       BMI:   Body mass index is 27.81 kg/m².    General Appearance:  alert, well developed, in no acute distress  Nutritional:  no extreme weight gain or loss  Head: Atraumatic  Eyes:  normal conjunctivae, sclera., normal sclera and normal pupils  Throat/Neck: normal sound to voice.  Normal hearing, normal speech  Back: no kyphosis  Respiratory:  Speaking in full sentences, non-labored. no increased work of breathing, no audible wheezing    Skin:  normal moisture and skin texture, no visible lesions  Hair and nails: normal scalp hair  Hematologic:  no excessive bruising  Neuro: motor grossly intact, moving all extremities without difficulty  Psychiatric:  oriented to time, self, and place  Extremities: no obvious extremity swelling, no lesions    Diabetic Foot Exam:  Bilateral barefoot skin diabetic exam is normal, visualized feet and the appearance is normal.  Bilateral monofilament/sensation of both feet is normal.  Pulsation pedal pulse exam of both lower legs/feet is normal as well.    LABS: Pertinent labs reviewed    ASSESSMENT/PLAN:    -Reviewed with patient the pathogenesis of diabetes, clinical significance of A1c, and common complications such as: microvascular, macrovascular and diabetic ketoacidosis. Patient verbalizes understanding of the importance of glycemic control and the goals of therapy.   Per ADA 2024 guidelines, it is recommended that older adults (age 65 and above) who are healthy with few coexisting chronic illnesses, intact cognitive and functional status, should have A1C goal of <7.5%, fasting or preprandial glucose of  and bedtime glucose of .      1.Type 2 Diabetes Mellitus, uncontrolled, with hyperglycemia   -LAB DATA  HbA,C: 8.0% today --> increased   -Increase Novolin 70/30 until able to start new insulin:   20 --> 26 units with breakfast    20 units with dinner     New regimen in the morning:  - stop 70/30 insulin   -start Lantus 20 units daily in the morning   - start Novolog    4 units with breakfast   4 units with lunch  4 units with dinner     -Discussed that she gives me an update on her BG readings 1 day after starting new insulin.   - reviewed benefits of CGM technology. Patient is interested in getting started with Dexcom G7 cgm. She was been  educated on use and sensor application process in clinic today. Sample was started in clinic and was connected with our office. Will start paperwork with DME.   - continue with low carb diet.   - discussed to include protein with each meal and reviewed the order she should try to eat meals.   -continue staying active with [employer] and to increase exercise as much as safely able with goal of 4x weekly of at least 30 mins each session.   -reviewed target goal BG readings and A1C   -reviewed when to call and notify me of abnormal BG readings       b) No nephropathy: GFR: 83 on 2024 and urine MA: 108.6 on 3/13/2024  c) Discussed importance of annual eye exams. Reminded to schedule optho apt.   D)foot exam: normal today 2024. Referral for podiatry entered today.   e) start using Dexcom G7 cgm today    f) Life style changes reviewed     2. Blood pressure management:   - normotensive today     3.Hyperlipidemia   -LDL: 136 and Tri on 2024  -on atorvastatin 40mg at bedtime   -will plan to repeat at next f/u visit       RTC in 2 months   Patient instructed to call sooner if they develop Blood glucose readings <75 and/or if they have readings persistently >200.     The risks and benefits of my recommendations were discussed with the patient today. questions were also answered to the best of my knowledge. Patient verbalizes understanding of these issues and agrees to the plan.    2024  YONG Quiñones

## 2024-08-05 NOTE — PROGRESS NOTES
Sample continuous glucose monitor,Dexcom G7, placed on patient's Left arm. Patient understands guidelines and usage of sensor. Patient's sensor connected to clinic and aware of follow up.

## 2024-08-08 ENCOUNTER — TELEPHONE (OUTPATIENT)
Dept: ENDOCRINOLOGY CLINIC | Facility: CLINIC | Age: 69
End: 2024-08-08

## 2024-08-08 ENCOUNTER — OFFICE VISIT (OUTPATIENT)
Dept: FAMILY MEDICINE CLINIC | Facility: CLINIC | Age: 69
End: 2024-08-08

## 2024-08-08 VITALS
RESPIRATION RATE: 16 BRPM | SYSTOLIC BLOOD PRESSURE: 136 MMHG | DIASTOLIC BLOOD PRESSURE: 76 MMHG | BODY MASS INDEX: 27.66 KG/M2 | WEIGHT: 166 LBS | HEART RATE: 72 BPM | HEIGHT: 65 IN | TEMPERATURE: 98 F

## 2024-08-08 DIAGNOSIS — Z79.4 TYPE 2 DIABETES MELLITUS WITH HYPERGLYCEMIA, WITH LONG-TERM CURRENT USE OF INSULIN (HCC): Primary | ICD-10-CM

## 2024-08-08 DIAGNOSIS — E11.65 TYPE 2 DIABETES MELLITUS WITH HYPERGLYCEMIA, WITH LONG-TERM CURRENT USE OF INSULIN (HCC): Primary | ICD-10-CM

## 2024-08-08 DIAGNOSIS — R22.1 LUMP IN NECK: ICD-10-CM

## 2024-08-08 DIAGNOSIS — J35.8 TONSILLAR CYST: Primary | ICD-10-CM

## 2024-08-08 PROCEDURE — 99213 OFFICE O/P EST LOW 20 MIN: CPT | Performed by: FAMILY MEDICINE

## 2024-08-08 RX ORDER — AMOXICILLIN 875 MG/1
875 TABLET, COATED ORAL 2 TIMES DAILY
Qty: 20 TABLET | Refills: 0 | Status: SHIPPED | OUTPATIENT
Start: 2024-08-08

## 2024-08-08 NOTE — TELEPHONE ENCOUNTER
Patient states that she had a CGM placed on 8/5/24 and it is not working.  She wants to know if she can get this replaced or if a prescription would be placed for her.  Please call

## 2024-08-08 NOTE — PROGRESS NOTES
Subjective:   Patient ID: Nely Saravia is a 69 year old female.    Pt presents with a lump of the right posterior of the neck area. No fevers or sore throat. No acute illness or injury.   Pt has had recent scan of neck and was found to have a tonsillar cyst. Was advised to follow with ENT for further evaluation. No sore throat or ear pains.           History/Other:   Review of Systems   Constitutional:  Negative for fever.   HENT:  Negative for congestion, postnasal drip, rhinorrhea and sore throat.    Respiratory:  Negative for cough and shortness of breath.      Current Outpatient Medications   Medication Sig Dispense Refill    amoxicillin 875 MG Oral Tab Take 1 tablet (875 mg total) by mouth 2 (two) times daily. 20 tablet 0    insulin aspart (NOVOLOG FLEXPEN) 100 Units/mL Subcutaneous Solution Pen-injector Inject 4 Units into the skin 3 (three) times daily before meals. 12 mL 0    insulin glargine (LANTUS SOLOSTAR) 100 UNIT/ML Subcutaneous Solution Pen-injector Inject 20 Units into the skin every morning. 18 mL 0    Insulin Pen Needle 33G X 4 MM Does not apply Misc 1 each in the morning, at noon, in the evening, and at bedtime. Use 4x daily with insulin 400 each 1    lisinopril 40 MG Oral Tab Take 0.5 tablets (20 mg total) by mouth daily. 30 tablet 1    aspirin 81 MG Oral Tab EC Take 1 tablet (81 mg total) by mouth daily. 30 tablet 1    metoprolol succinate ER 25 MG Oral Tablet 24 Hr Take 1 tablet (25 mg total) by mouth Daily Beta Blocker. 30 tablet 1    atorvastatin 40 MG Oral Tab Take 2 tablets (80 mg total) by mouth every morning. 90 tablet 3    Meloxicam 7.5 MG Oral Tab Take 1 tablet (7.5 mg total) by mouth daily. 30 tablet 1    Glucose Blood (ONETOUCH ULTRA) In Vitro Strip To check blood sugars up to twice per day 100 strip 2    Blood Glucose Monitoring Suppl (ONETOUCH ULTRA MINI) w/Device Does not apply Kit To use as directed. 1 kit 0    amLODIPine 10 MG Oral Tab Take 1 tablet (10 mg total) by mouth  every morning. 90 tablet 3    traMADol 50 MG Oral Tab Take 1 tablet (50 mg total) by mouth every 6 (six) hours as needed for Pain. No alcohol or driving on this med. Stop if lethargic or hallucinating. 20 tablet 0    neomycin-polymyxin-hydrocortisone 3.5-06909-0 Otic Solution Place 3 drops into the right ear 3 (three) times daily. 10 mL 0     Allergies:No Known Allergies    Objective:   Physical Exam  Vitals reviewed.   Constitutional:       Appearance: Normal appearance. She is well-developed.   HENT:      Right Ear: Tympanic membrane and ear canal normal.      Left Ear: Tympanic membrane and ear canal normal.      Mouth/Throat:      Mouth: Mucous membranes are moist.      Pharynx: No oropharyngeal exudate or posterior oropharyngeal erythema.   Neck:      Comments: Small lump of the right posterior neck area. Mild discomfort to palpation. Soft.   Cardiovascular:      Rate and Rhythm: Normal rate and regular rhythm.      Heart sounds: Normal heart sounds.   Pulmonary:      Effort: Pulmonary effort is normal. No respiratory distress.      Breath sounds: Normal breath sounds. No wheezing or rales.   Musculoskeletal:      Cervical back: Normal range of motion and neck supple.   Lymphadenopathy:      Cervical: No cervical adenopathy.   Neurological:      Mental Status: She is alert.         Assessment & Plan:   1. Tonsillar cyst /   2. Lump in neck: ? Lymphadenitis  - After discussion, will send to ENT for further evaluation and treatment; To call if any significant symptoms. Amoxicillin as directed. Call if any sig symptoms; follow up as needed and will ENT       No orders of the defined types were placed in this encounter.      Meds This Visit:  Requested Prescriptions     Signed Prescriptions Disp Refills    amoxicillin 875 MG Oral Tab 20 tablet 0     Sig: Take 1 tablet (875 mg total) by mouth 2 (two) times daily.       Imaging & Referrals:  ENT - INTERNAL

## 2024-08-09 NOTE — TELEPHONE ENCOUNTER
Endo staff: ok to give another sample of Dexcom g7. Please start paperwork with DME for Dexcom G7 cgm.       Please also clarify if patient was able to start taking new insulin and if so, review BG readings after making the change.     Patient was started on:   - Lantus 20 units daily in the morning   - Novolog    4 units with breakfast   4 units with lunch  4 units with dinner       Thank you

## 2024-08-12 ENCOUNTER — OFFICE VISIT (OUTPATIENT)
Dept: OTOLARYNGOLOGY | Facility: CLINIC | Age: 69
End: 2024-08-12
Payer: MEDICARE

## 2024-08-12 VITALS — HEIGHT: 65 IN | WEIGHT: 166 LBS | BODY MASS INDEX: 27.66 KG/M2

## 2024-08-12 DIAGNOSIS — J35.8 TONSILLAR CYST: Primary | ICD-10-CM

## 2024-08-12 PROCEDURE — 99213 OFFICE O/P EST LOW 20 MIN: CPT | Performed by: STUDENT IN AN ORGANIZED HEALTH CARE EDUCATION/TRAINING PROGRAM

## 2024-08-12 NOTE — PROGRESS NOTES
Nely Saravia is a 69 year old female.   Chief Complaint   Patient presents with    Tonsil Problem     Patient is here for tonsillar cyst, referred by primary doctor.     HPI:   69-year-old here for evaluation of a left tonsillar finding on a CT scan of her brain and carotids on July 8.  She reports no symptoms of her throat on either side or discomfort or pain with swallowing swallowing issues.    Current Outpatient Medications   Medication Sig Dispense Refill    amoxicillin 875 MG Oral Tab Take 1 tablet (875 mg total) by mouth 2 (two) times daily. 20 tablet 0    insulin aspart (NOVOLOG FLEXPEN) 100 Units/mL Subcutaneous Solution Pen-injector Inject 4 Units into the skin 3 (three) times daily before meals. 12 mL 0    insulin glargine (LANTUS SOLOSTAR) 100 UNIT/ML Subcutaneous Solution Pen-injector Inject 20 Units into the skin every morning. 18 mL 0    Insulin Pen Needle 33G X 4 MM Does not apply Misc 1 each in the morning, at noon, in the evening, and at bedtime. Use 4x daily with insulin 400 each 1    lisinopril 40 MG Oral Tab Take 0.5 tablets (20 mg total) by mouth daily. 30 tablet 1    aspirin 81 MG Oral Tab EC Take 1 tablet (81 mg total) by mouth daily. 30 tablet 1    metoprolol succinate ER 25 MG Oral Tablet 24 Hr Take 1 tablet (25 mg total) by mouth Daily Beta Blocker. 30 tablet 1    atorvastatin 40 MG Oral Tab Take 2 tablets (80 mg total) by mouth every morning. 90 tablet 3    Meloxicam 7.5 MG Oral Tab Take 1 tablet (7.5 mg total) by mouth daily. 30 tablet 1    Glucose Blood (ONETOUCH ULTRA) In Vitro Strip To check blood sugars up to twice per day 100 strip 2    Blood Glucose Monitoring Suppl (ONETOUCH ULTRA MINI) w/Device Does not apply Kit To use as directed. 1 kit 0    amLODIPine 10 MG Oral Tab Take 1 tablet (10 mg total) by mouth every morning. 90 tablet 3    traMADol 50 MG Oral Tab Take 1 tablet (50 mg total) by mouth every 6 (six) hours as needed for Pain. No alcohol or driving on this med. Stop if  lethargic or hallucinating. 20 tablet 0    neomycin-polymyxin-hydrocortisone 3.5-44310-9 Otic Solution Place 3 drops into the right ear 3 (three) times daily. 10 mL 0      Past Medical History:    Diabetes (HCC)    High blood pressure    Hyperlipidemia      Social History:  Social History     Socioeconomic History    Marital status:    Tobacco Use    Smoking status: Never    Smokeless tobacco: Never   Vaping Use    Vaping status: Never Used   Substance and Sexual Activity    Alcohol use: Never    Drug use: Never     Social Determinants of Health     Food Insecurity: No Food Insecurity (7/7/2024)    Food Insecurity     Food Insecurity: Never true   Transportation Needs: No Transportation Needs (7/7/2024)    Transportation Needs     Lack of Transportation: No   Housing Stability: Low Risk  (7/7/2024)    Housing Stability     Housing Instability: No      Past Surgical History:   Procedure Laterality Date    Hysterectomy           EXAM:   Ht 5' 5\" (1.651 m)   Wt 166 lb (75.3 kg)   BMI 27.62 kg/m²     System Details   Skin Inspection - Normal.   Constitutional Overall appearance - Normal.   Head/Face Symmetric, TMJ tenderness not present    Eyes EOMI, PERRL   Right ear:  Canal clear, TM intact, no CLEMENTINA   Left ear:  Canal clear, TM intact, no CLEMENTINA   Nose: Septum midline, inferior turbinates not enlarged, nasal valves without collapse    Oral cavity/Oropharynx: Small smooth appearing cystlike structure of the mid pole of her left tonsil, somewhat large tongue precluding the view of the inferior pole of the tonsil, tonsils symmetric    Neck: Soft without LAD, thyroid not enlarged  Voice clear/ no stridor   Other:      SCOPES AND PROCEDURES:         AUDIOGRAM AND IMAGING:     CTA 7/8/24-    Subcentimeter area of low attenuation left palatine tonsil which could reflect a small tonsillar cyst.  Please correlate clinically for any signs of infection as a small abscess is in the differential.  A tonsillar lesion may be  less likely but not   entirely excluded and follow up nonemergent ENT evaluation also advised.      IMPRESSION:   1. Tonsillar cyst       Recommendations:  -Exam with a benign appearing cystlike structure of the mid pole of her left tonsil possibly correlating with the CT scan.  Given the asymptomatic nature and small size we will currently observe  -I discussed return precautions in detail with her    The patient indicates understanding of these issues and agrees to the plan.      Wild Wayne MD  8/12/2024  4:05 PM

## 2024-08-13 RX ORDER — ACYCLOVIR 400 MG/1
1 TABLET ORAL
Qty: 3 EACH | Refills: 11 | Status: SHIPPED | OUTPATIENT
Start: 2024-08-13

## 2024-08-13 NOTE — TELEPHONE ENCOUNTER
LVM asking pt to call back to discuss dexcom & medication regimen    Dexcom G7 sensors sent to pharmacy     PA started through [employer].   According to [employer], PA not needed  \"The patient currently has access to the requested medication and a Prior Authorization is not needed for the patient/medication.\"

## 2024-08-13 NOTE — TELEPHONE ENCOUNTER
I do not see any BG readings recorded on Dexcom clarity.     Please call patient to see if she has been able to transition to new insulin regimen.  And if so, please get an updated blood sugar readings from the past few days for review.       Thank you!

## 2024-08-14 ENCOUNTER — TELEPHONE (OUTPATIENT)
Dept: FAMILY MEDICINE CLINIC | Facility: CLINIC | Age: 69
End: 2024-08-14

## 2024-08-14 NOTE — TELEPHONE ENCOUNTER
Patient Last office visit progress note w/ Dr. Méndez fax Attn Vianney at  for continue medical care

## 2024-08-14 NOTE — TELEPHONE ENCOUNTER
Alex from [facility] called stating she will be faxing home health orders for the patient. She is requesting that they are signed, dated and faxed back to the following fax#:    Fax#: [phone]

## 2024-08-14 NOTE — TELEPHONE ENCOUNTER
Vianney is requesting a copy of the patient's last office visit note. Please, fax to [phone].

## 2024-08-15 ENCOUNTER — MED REC SCAN ONLY (OUTPATIENT)
Dept: FAMILY MEDICINE CLINIC | Facility: CLINIC | Age: 69
End: 2024-08-15

## 2024-08-16 ENCOUNTER — TELEPHONE (OUTPATIENT)
Dept: ENDOCRINOLOGY CLINIC | Facility: CLINIC | Age: 69
End: 2024-08-16

## 2024-08-16 NOTE — TELEPHONE ENCOUNTER
Left voice message for pt to call back. Sent pt Mc message with instructions as written below by Ton BEACH and to inquire additional information about sugars.

## 2024-08-16 NOTE — TELEPHONE ENCOUNTER
Patient states has lump on her neck and headache. States unsure if insulin is causing this. Please call.

## 2024-08-16 NOTE — TELEPHONE ENCOUNTER
Noted. Per chart review, she was diagnosed with Tonsillar cyst on 8/8. This is unrelated to insulin therapy or diabetes. I would recommend that calls ENT (who she met with on 8/12 for this reason) to further discuss this -- Dr. Wayne [phone]      Regarding her glucose readings although they seem to be above goal at this time, how have they been in the past few days? Has she checked her glucose readings via regular glucometer?     Thank you!

## 2024-08-16 NOTE — TELEPHONE ENCOUNTER
Ton-    Spoke with pt  States that her headache started 3 days ago, states it feels \"sore.\" Has been taking ibuprofen and tylenol but headache has not gone away.    States she has a lump on the back of her neck. States she received antibiotic on 8/8 from PCP. Lump has decreased in size yesterday but has grown again today.    Pt wears dexcom, minimal data shown in clarity d/t pt stating first sensor was defective. applied new sensor yesterday. Data shown below:      Pt denies blurry vision, SOB, chest pain.   States she has been taking Lantus 20U daily, novolog 4U TID with  meals as prescribed on 8/5.    States she drinks a couple small bottles of water per day along with iced tea.    RN advised pt to increase water intake as iced tea can be dehydrating. Advised pt to also contact PCP concerning this issue.

## 2024-08-18 ENCOUNTER — HOSPITAL ENCOUNTER (OUTPATIENT)
Age: 69
Discharge: HOME | End: 2024-08-18
Payer: MEDICARE

## 2024-08-18 VITALS
SYSTOLIC BLOOD PRESSURE: 145 MMHG | TEMPERATURE: 98 F | HEART RATE: 73 BPM | RESPIRATION RATE: 18 BRPM | OXYGEN SATURATION: 100 % | DIASTOLIC BLOOD PRESSURE: 61 MMHG

## 2024-08-18 DIAGNOSIS — L08.9 INFECTION OF SCALP: Primary | ICD-10-CM

## 2024-08-18 RX ORDER — CEFADROXIL 500 MG/1
500 CAPSULE ORAL 2 TIMES DAILY
Qty: 10 CAPSULE | Refills: 0 | Status: SHIPPED | OUTPATIENT
Start: 2024-08-18 | End: 2024-08-23

## 2024-08-18 NOTE — DISCHARGE INSTRUCTIONS
Complete entire course of oral antibiotic as directed   Apply topical antibiotic to rash on scalp as directed   Keep skin clean and avoid touching rash   Follow up with your primary care provider

## 2024-08-18 NOTE — ED PROVIDER NOTES
Chief Complaint   Patient presents with    Hair/Scalp Problem       History obtained from: patient,  at bedside   services not used     HPI:     Nely Saravia is a 69 year old female who presents with rash to scalp x 2-3 days. Patient states area is \"sore.\" Patient recently completed course of amoxicillin for ?lymphadenitis to right neck with tonsillar cyst incidentally found on CT. Patient was seen by ENT for this. Patient states she is otherwise feeling well and denies any additional complaints or concerns at this time. Denies head injury, fevers, chills, bleeding or drainage from skin, swelling, headache, dizziness, vision changes, speech changes, numbness, weakness, vomiting.     PMH  Past Medical History:    Diabetes (HCC)    High blood pressure    Hyperlipidemia    TIA (transient ischemic attack)       PFSH    PFSH asessment screens reviewed and agree.  Nurses notes reviewed I agree with documentation.    Family History   Problem Relation Age of Onset    Diabetes Father     Hypertension Father     Hypertension Mother      Family history reviewed with patient/caregiver and is not pertinent to presenting problem.  Social History     Socioeconomic History    Marital status:      Spouse name: Not on file    Number of children: Not on file    Years of education: Not on file    Highest education level: Not on file   Occupational History    Not on file   Tobacco Use    Smoking status: Never    Smokeless tobacco: Never   Vaping Use    Vaping status: Never Used   Substance and Sexual Activity    Alcohol use: Never    Drug use: Never    Sexual activity: Not on file   Other Topics Concern    Not on file   Social History Narrative    Not on file     Social Determinants of Health     Financial Resource Strain: Not on file   Food Insecurity: No Food Insecurity (7/7/2024)    Food Insecurity     Food Insecurity: Never true   Transportation Needs: No Transportation Needs (7/7/2024)    Transportation  Needs     Lack of Transportation: No     Car Seat: Not on file   Physical Activity: Not on file   Stress: Not on file   Social Connections: Not on file   Housing Stability: Low Risk  (7/7/2024)    Housing Stability     Housing Instability: No     Housing Instability Emergency: Not on file     Crib or Bassinette: Not on file         ROS:   Positive for stated complaint: rash to scalp   All other systems reviewed and negative except as noted above.  Constitutional and Vital Signs Reviewed.    Physical Exam:     Findings:    /61   Pulse 73   Temp 97.6 °F (36.4 °C) (Temporal)   Resp 18   SpO2 100%   GENERAL: well developed, no acute distress, non-toxic appearing   SKIN: good skin turgor  HEAD: normocephalic, atraumatic, small erythematous patch with overlying honey-colored crusting to right scalp, tender, no swelling, no vesicles, no drainage or bleeding, no mass or fluctuance   EYES: sclera non-icteric bilaterally, conjunctiva clear bilaterally, PERRLA, EOMs intact   OROPHARYNX: MMM, pharynx clear, no exudates or swelling, uvula midline, no tongue elevation, maintaining airway and secretions  NECK: supple, no lymphadenopathy, no nuchal rigidity, no trismus, no edema, phonation normal    CARDIO: RRR, normal heart sounds   LUNGS: clear to auscultation bilaterally, no increased WOB  EXTREMITIES: no cyanosis or edema, MATA without difficulty  NEURO: no focal deficits  PSYCH: alert and oriented x3, answering questions appropriately, mood appropriate        MDM/Assessment/Plan:   Orders for this encounter:    Orders Placed This Encounter    cefadroxil 500 MG Oral Cap     Sig: Take 1 capsule (500 mg total) by mouth 2 (two) times daily for 5 days.     Dispense:  10 capsule     Refill:  0    mupirocin 2 % External Ointment     Sig: Apply 1 Application topically 3 (three) times daily for 14 days.     Dispense:  1 each     Refill:  0       Labs performed this visit:  No results found for this or any previous visit (from  the past 10 hour(s)).    Imaging performed this visit:  No orders to display       Medical Decision Making  DDx includes impetigo versus staph infection versus cellulitis versus abrasion versus contact dermatitis. Patient is overall very well-appearing with stable vitals. No signs or symptoms of systemic illness. No history of injury/trauma. No abscess. Discussed possible etiologies of rash and recommend antibiotic coverage for possible infection. Patient verbalizes understanding and agreement with this plan.  Rx cefadroxil, topical mupirocin ointment. Discussed supportive care and wound care. Instructed patient to go directly to nearest ER with any worsening or concerning symptoms. Follow up with PCP.         Diagnosis:    ICD-10-CM    1. Infection of scalp  L08.9           All results reviewed and discussed with patient/patient's family. Patient/patient's family verbalize excellent understanding of instructions and feels comfortable with plan. All of patient's/patient's family's questions were addressed.   See AVS for detailed discharge instructions for your condition today.    Follow Up with:  Armando Méndez MD  [address] [place] [id number] [phone]            Note: This document was dictated using Dragon medical dictation software.  Proofreading was performed to the best of my ability, but errors may be present.    Winsome Shin PA-C

## 2024-08-18 NOTE — ED INITIAL ASSESSMENT (HPI)
Sore spot on back of head, noticed 3 days ago. Per pt has been oozing blood. Denies injury/trauma. Scabbing noted at site.

## 2024-08-19 NOTE — TELEPHONE ENCOUNTER
Noted. Cgm download reviewed and it seems that her glucose patterns have been stable. Occasional hyperglycemia noted after dinner meal. Please ask patient to adjust her insulin doses to the following:      - continue with Lantus 20 units daily  - increase Novolog    4 units with breakfast   4 units with lunch   4 --> 6 units with dinner       Thank you

## 2024-08-19 NOTE — TELEPHONE ENCOUNTER
LVM asking pt to call back to discuss BG readings. MC sent asking if pt had kept log of BG readings prior to putting sensor on.     Marjosefaa- dexcom report sent to you via [employer] for BG readings over the weekend

## 2024-08-21 ENCOUNTER — HOSPITAL ENCOUNTER (OUTPATIENT)
Dept: ENDOCRINOLOGY | Facility: HOSPITAL | Age: 69
Discharge: HOME | End: 2024-08-21
Attending: FAMILY MEDICINE
Payer: MEDICARE

## 2024-08-21 DIAGNOSIS — E11.21 TYPE 2 DIABETES MELLITUS WITH DIABETIC NEPHROPATHY, UNSPECIFIED WHETHER LONG TERM INSULIN USE (HCC): Primary | ICD-10-CM

## 2024-08-21 PROCEDURE — 97802 MEDICAL NUTRITION INDIV IN: CPT

## 2024-08-21 NOTE — PROGRESS NOTES
Medical Nutrition Therapy Assessment    Nely Saravia 6/30/1955 was seen for individual Diabetic Medical Nutrition Therapy:    Date: 8/21/2024   Start time 1015A  End time: 1115A    Assessment   present with patient, uses walker  Walks granddaughter home from school, so needs appts before [employer]P   is bus driving  Needs eye and foot exam    Anthropometrics:  Height: 5'5\"  Weight: 166#    Reports 40 - 60# loss since January? Unintentional?     Current diabetes medications:  Lantus 20 AM around 8-9A  4-4-6 at mealtimes rapid acting insulin  No SSI yet    Labs:  Lab Results   Component Value Date    A1C 14.7 (H) 07/07/2024    A1C 9.8 (H) 03/13/2024    CHOLEST 201 (H) 07/08/2024    CHOLEST 214 (H) 03/13/2024     (H) 07/08/2024     (H) 03/13/2024    HDL 44 07/08/2024    HDL 65 (H) 03/13/2024    NONHDLC 157 (H) 07/08/2024    NONHDLC 149 (H) 03/13/2024    TRIG 117 07/08/2024    TRIG 68 03/13/2024    BUN 8 (L) 07/09/2024    BUN 10 07/08/2024    CREATSERUM 0.77 07/09/2024    CREATSERUM 0.68 07/08/2024    GFRNAA 61 10/07/2021    GFRNAA 57 (L) 10/06/2021    GFRAA 70 10/07/2021    GFRAA 65 10/06/2021       SMBG at home: Dexcom G7, some difficulty with adhesives - discussed skin tac and provided overpatches/tegaderm  Numbers coming down, 47% in range right now, no low blood sugars, 8% very high  21.9% variation, average 190 mg/dL    (AM) cereal - cheerios with honey, almond milk unsweet  (LUNCH) out to eat - split beef sandwich, small order fries - doesn't like potatoes  (DINNER) BT sandwiches, doesn't like lettuce - little umana - fruit - vegetables   (SNACKS) once in a while, not a bit snacker, loves potato chips   (FLUIDS) water, unsweet tea, no coffee   Stopped soda - Dr. Pepper, 3 cups per day     Watching fried foods        Physical Activity: Lower activity recently    Has stationary bike/pedals  Uses walker    Nutrition Diagnosis  Food/nutrition related knowledge deficit related to healthy  eating/insulin (insulin - carb match) as evidenced by request for info     Intervention  Comprehensive Nutrition Education Provided:     [] discussed healthy weight management, glucose management, lipid management, and BP management as related to diabetes   [] discussed basic meal planning guidelines for diabetes regular mealtime, limited concentrated sweets. Worked on establishing eating pattern/timing of meals and snacks    [] discussed in depth meal planning using the healthy eating with diabetes plate method with focus on balanced macronutrient consumption, including identifying foods that are carbohydrates, lean protein, non-starchy vegetables, and heart healthy fats   [x] stressed importance of carbohydrate consistency in each meal   [x] recommended carbohydrate targets of 30-45 grams at meals and 0-10 grams at snacks   [] educated on label reading   [] educated on portion control; including use of measuring cups, spoons, or food scale   [x] provided suggestions for lower carb, healthy snacks   [] educated on importance of food monitoring and how to use food logs   [] discussed how to handle special occasions, dining out, and eating on the go   [] discussed menu planning, healthy food shopping, cooking tips, and need to pre prepare foods    [x] educated on emotional eating and being mindful at meals   [x]  reviewed other behavior modification strategies    [x]emphasized the need for small, sustainable changes and working on SMART goals to encourage sustainable behaviors    [] instructions on how to use helpful websites or nutrition/tracking apps reviewed    [] taught to use carbohydrate to insulin ratio and insulin sensitivity factor    [] discussed barriers and overcoming barriers to best achieve meal planning goals    [] discussed Mediterranean diet/DASH diet, as appropriate, to address lipids or BP   [] reviewed renal diet components and how to incorporate this with diabetes meal planning     Education on  Increased Physical Activity:  discussed how increased physical activity improves insulin resistance, blood glucose control, and heart health    Will chat more on insulin - activity - blood sugars in the future    Monitoring  diet modification/understanding, medication compliance/understanding, blood sugars, hemoglobin A1c, lipid panel, and weight trends    Evaluation  Behavioral Goal(s) selected:   Healthy Eating and Taking Medications    See instructions from today's visit    Support Plan:  Other: TBD    Plan  Blood sugar goals: less than 130 mg/dl in the morning and less than 180 mg/dl 2 hours after meals.  Keep glucose tabs or fast acting carbohydrates with you for treatment of lows; for blood glucose levels less than 70 mg/dl, take 15 grams of fast acting carbohydrates (3-4 glucose tabs, 4 ounces of juice, or as discussed). Retest in 15 min. If not above 70 mg/dl, retreat.   Call Carlene Brandt RD at [phone] (option 3) for problems/concerns.     Carlene Brandt RD

## 2024-08-21 NOTE — PATIENT INSTRUCTIONS
[phone] - foot doctor here   [phone] - Dr. Leonardo    Keep food/blood sugar/insulin logs - 3-5 days before next visit  Work to count on carbohydrates, aim for 30 - 45 g per meal    Continue to give insulin pre meals - rotate sites like Carrol instructed  Continue with CGM - consider skin tac or mastisol

## 2024-08-22 ENCOUNTER — TELEPHONE (OUTPATIENT)
Dept: FAMILY MEDICINE CLINIC | Facility: CLINIC | Age: 69
End: 2024-08-22

## 2024-08-22 RX ORDER — METOPROLOL SUCCINATE 25 MG/1
25 TABLET, EXTENDED RELEASE ORAL
Qty: 90 TABLET | Refills: 3 | Status: SHIPPED | OUTPATIENT
Start: 2024-08-22

## 2024-08-22 RX ORDER — ATORVASTATIN CALCIUM 80 MG/1
80 TABLET, FILM COATED ORAL DAILY
Qty: 90 TABLET | Refills: 3 | Status: SHIPPED | OUTPATIENT
Start: 2024-08-22

## 2024-08-22 NOTE — TELEPHONE ENCOUNTER
Please review.  Prescribed by hospitalist after recent admit.  Munira Cruz MD Dr. Pae - is patient to continue taking?     Requested Prescriptions   Pending Prescriptions Disp Refills    metoprolol succinate ER 25 MG Oral Tablet 24 Hr 90 tablet 3     Sig: Take 1 tablet (25 mg total) by mouth Daily Beta Blocker.       Hypertension Medications Protocol Failed - 8/22/2024  6:22 PM        Failed - Last BP reading less than 140/90     BP Readings from Last 1 Encounters:   08/18/24 145/61               Passed - CMP or BMP in past 12 months        Passed - In person appointment or virtual visit in the past 12 mos or appointment in next 3 mos     Recent Outpatient Visits              1 week ago Tonsillar cyst    [facility] Wild [place] MD    Office Visit    2 weeks ago Tonsillar cyst    [facility] Armando Méndez MD    Office Visit    2 weeks ago Type 2 diabetes mellitus with hyperglycemia, with long-term current use of insulin ([facility])    [facility] Ton Lawrence APRN    Office Visit    4 weeks ago Ischemic stroke ([facility])    [facility] Genna Pizarro MD    Office Visit    4 weeks ago Acute CVA (cerebrovascular accident) ([facility])    [facility] Armando Méndez MD    Office Visit          Future Appointments         Provider Department Appt Notes    In 5 days Armando Méndez MD [facility] IC flwp  Policy advised    In 3 weeks Carlene Brandt RD [facility]     In 1 month Ton Lawrence APRN [facility] 2 month follow up    In 3 months Genna Pizarro MD [facility] 4 mos f/u                    Passed - EGFRCR or GFRNAA > 50     GFR Evaluation  EGFRCR: 83 , resulted  on 7/9/2024             Future Appointments         Provider Department Appt Notes    In 5 days Armando Méndez MD [facility] flwp  Policy advised    In 3 weeks Carlene Brandt RD [facility]     In 1 month Ton Lawrence APRN [facility] 2 month follow up    In 3 months Genna Pizarro MD [facility] 4 mos f/u          Recent Outpatient Visits              1 week ago Tonsillar cyst    [facility] Wild Wyane MD    Office Visit    2 weeks ago Tonsillar cyst    [facility] Armando Méndez MD    Office Visit    2 weeks ago Type 2 diabetes mellitus with hyperglycemia, with long-term current use of insulin ([facility])    [facility] Ton Lawrence APRN    Office Visit    4 weeks ago Ischemic stroke (HCC)    [facility] Genna Pizarro MD    Office Visit    4 weeks ago Acute CVA (cerebrovascular accident) ([facility])    [facility] Armando Méndez MD    Office Visit

## 2024-08-22 NOTE — TELEPHONE ENCOUNTER
[facility] pharmacy states that patient was prescribed Atorvastatin 40 mg to take two tabs daily in the ER. Her insurance will not cover that. They are requesting a prescription for Atorvastatin 80 mg daily. Medication pended for your review and approval.         Provider Information    Authorizing Provider Encounter Provider Ordering Provider   Munira Cruz MD (none) Munira Cruz MD     Medication Detail    Medication Quantity Refills Start End   atorvastatin 40 MG Oral Tab 90 tablet 3 7/9/2024 --   Sig:   Take 2 tablets (80 mg total) by mouth every morning.     Route:   Oral     Order #:   [id number]

## 2024-08-22 NOTE — TELEPHONE ENCOUNTER
metoprolol succinate ER 25 MG Oral Tablet 24 Hr, Take 1 tablet (25 mg total) by mouth Daily Beta Blocker., Disp: 30 tablet, Rfl: 1

## 2024-08-23 NOTE — TELEPHONE ENCOUNTER
Message noted: Chart reviewed and may refill medication as requested. Prescription sent to listed pharmacy. Pharmacy to notify patient. Pt notified through [employer]

## 2024-08-27 ENCOUNTER — OFFICE VISIT (OUTPATIENT)
Dept: FAMILY MEDICINE CLINIC | Facility: CLINIC | Age: 69
End: 2024-08-27
Payer: MEDICARE

## 2024-08-27 VITALS
DIASTOLIC BLOOD PRESSURE: 72 MMHG | RESPIRATION RATE: 16 BRPM | WEIGHT: 172 LBS | TEMPERATURE: 99 F | BODY MASS INDEX: 28.66 KG/M2 | HEART RATE: 72 BPM | SYSTOLIC BLOOD PRESSURE: 132 MMHG | HEIGHT: 65 IN

## 2024-08-27 DIAGNOSIS — Z86.39 HISTORY OF DIABETES MELLITUS: ICD-10-CM

## 2024-08-27 DIAGNOSIS — L03.811 CELLULITIS OF HEAD OR SCALP: Primary | ICD-10-CM

## 2024-08-27 PROCEDURE — 99213 OFFICE O/P EST LOW 20 MIN: CPT | Performed by: FAMILY MEDICINE

## 2024-08-27 NOTE — PROGRESS NOTES
Subjective:   Patient ID: Nely Saravia is a 69 year old female.    Pt presents for follow up from the urgent care scalp infection. Patient was treated with topical abx and also oral abx. Patient states symptoms are better and no bleeding or drainage. No fevers or sig pains.  Pt also needs referral for eye doctor for DM eye exam.        History/Other:   Review of Systems  Current Outpatient Medications   Medication Sig Dispense Refill    atorvastatin 80 MG Oral Tab Take 1 tablet (80 mg total) by mouth daily. 90 tablet 3    metoprolol succinate ER 25 MG Oral Tablet 24 Hr Take 1 tablet (25 mg total) by mouth Daily Beta Blocker. 90 tablet 3    mupirocin 2 % External Ointment Apply 1 Application topically 3 (three) times daily for 14 days. 1 each 0    Continuous Glucose Sensor (DEXCOM G7 SENSOR) Does not apply Misc 1 each Every 10 days. Use as directed every 10 days 3 each 11    amoxicillin 875 MG Oral Tab Take 1 tablet (875 mg total) by mouth 2 (two) times daily. 20 tablet 0    insulin aspart (NOVOLOG FLEXPEN) 100 Units/mL Subcutaneous Solution Pen-injector Inject 4 Units into the skin 3 (three) times daily before meals. 12 mL 0    insulin glargine (LANTUS SOLOSTAR) 100 UNIT/ML Subcutaneous Solution Pen-injector Inject 20 Units into the skin every morning. 18 mL 0    Insulin Pen Needle 33G X 4 MM Does not apply Misc 1 each in the morning, at noon, in the evening, and at bedtime. Use 4x daily with insulin 400 each 1    lisinopril 40 MG Oral Tab Take 0.5 tablets (20 mg total) by mouth daily. 30 tablet 1    aspirin 81 MG Oral Tab EC Take 1 tablet (81 mg total) by mouth daily. 30 tablet 1    Meloxicam 7.5 MG Oral Tab Take 1 tablet (7.5 mg total) by mouth daily. 30 tablet 1    Glucose Blood (ONETOUCH ULTRA) In Vitro Strip To check blood sugars up to twice per day 100 strip 2    Blood Glucose Monitoring Suppl (ONETOUCH ULTRA MINI) w/Device Does not apply Kit To use as directed. 1 kit 0    amLODIPine 10 MG Oral Tab Take 1  tablet (10 mg total) by mouth every morning. 90 tablet 3    traMADol 50 MG Oral Tab Take 1 tablet (50 mg total) by mouth every 6 (six) hours as needed for Pain. No alcohol or driving on this med. Stop if lethargic or hallucinating. 20 tablet 0    neomycin-polymyxin-hydrocortisone 3.5-49075-7 Otic Solution Place 3 drops into the right ear 3 (three) times daily. 10 mL 0     Allergies:No Known Allergies    Objective:   Physical Exam  Constitutional:       Appearance: Normal appearance.   Skin:     Comments: No redness of scalp or any bleeding/ drainage. Non-tender.   Neurological:      Mental Status: She is alert.   Psychiatric:         Mood and Affect: Mood normal.         Behavior: Behavior normal.         Assessment & Plan:   1. Cellulitis of head or scalp: resolved  - After discussion with patient, to monitor for symptoms and call if any significant symptoms; follow up as needed.      2. History of diabetes mellitus:   - After discussion, referral for eye doctor provided for further evaluation and treatment; To call if any significant symptoms.          No orders of the defined types were placed in this encounter.      Meds This Visit:  Requested Prescriptions      No prescriptions requested or ordered in this encounter       Imaging & Referrals:  OPHTHALMOLOGY - INTERNAL

## 2024-09-09 ENCOUNTER — TELEPHONE (OUTPATIENT)
Dept: FAMILY MEDICINE CLINIC | Facility: CLINIC | Age: 69
End: 2024-09-09

## 2024-09-12 ENCOUNTER — TELEPHONE (OUTPATIENT)
Dept: FAMILY MEDICINE CLINIC | Facility: CLINIC | Age: 69
End: 2024-09-12

## 2024-09-12 NOTE — TELEPHONE ENCOUNTER
Home health orders received from [facility], orders signed and faxed over with [employer],     #[id number]

## 2024-09-16 ENCOUNTER — HOSPITAL ENCOUNTER (OUTPATIENT)
Dept: ENDOCRINOLOGY | Facility: HOSPITAL | Age: 69
End: 2024-09-16
Attending: FAMILY MEDICINE
Payer: MEDICARE

## 2024-09-16 DIAGNOSIS — E11.21 TYPE 2 DIABETES MELLITUS WITH DIABETIC NEPHROPATHY, UNSPECIFIED WHETHER LONG TERM INSULIN USE (HCC): Primary | ICD-10-CM

## 2024-09-16 PROCEDURE — 97803 MED NUTRITION INDIV SUBSEQ: CPT

## 2024-09-16 NOTE — PROGRESS NOTES
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 Unintentional?     Current diabetes medications:  Lantus 20 AM around 8-9A  4-4-10 at mealtimes rapid acting insulin  No SSI yet    Labs:  Lab Results   Component Value Date    A1C 14.7 (H) 07/07/2024    A1C 9.8 (H) 03/13/2024    CHOLEST 201 (H) 07/08/2024    CHOLEST 214 (H) 03/13/2024     (H) 07/08/2024     (H) 03/13/2024    HDL 44 07/08/2024    HDL 65 (H) 03/13/2024    NONHDLC 157 (H) 07/08/2024    NONHDLC 149 (H) 03/13/2024    TRIG 117 07/08/2024    TRIG 68 03/13/2024    BUN 8 (L) 07/09/2024    BUN 10 07/08/2024    CREATSERUM 0.77 07/09/2024    CREATSERUM 0.68 07/08/2024    GFRNAA 61 10/07/2021    GFRNAA 57 (L) 10/06/2021    GFRAA 70 10/07/2021    GFRAA 65 10/06/2021       SMBG at home: Dexcom G7, some difficulty with adhesives - discussed skin tac and provided overpatches/tegaderm - now staying on more regularly   Current visit: 173 average, GMI 7.4%, 63% time in range  Last visit: (Numbers coming down, 47% in range right now, no low blood sugars, 8% very high  21.9% variation, average 190 mg/dL)    Complaints of lows post dinner, no logs with her - she does appear to drop pre dinner  Forgets insulin pre lunch on occasion    Diet Recall:   Last visit:  (AM) cereal - cheerios with honey, almond milk unsweet  (LUNCH) out to eat - split beef sandwich, small order fries - doesn't like potatoes  (DINNER) BT  sandwiches, doesn't like lettuce - little umana - fruit - vegetables   (SNACKS) once in a while, not a bit snacker, loves potato chips   (FLUIDS) water, unsweet tea, no coffee   Stopped soda - Dr. Pepper, 3 cups per day     Watching fried foods    Current visit:   Yesterday:   20 units AM: 4 units prior to Frosted Flakes from [employer] and almond milk, potentially whole banana - can't remember  Dinner: late lunch at [employer] - carrots, hashbrown casserole, crup of fruit, grilled chicken (4 units)    Physical Activity: Lower activity recently    Has stationary bike/pedals  Uses walker/cane    Nutrition Diagnosis  Food/nutrition related knowledge deficit related to healthy eating/insulin (insulin - carb match) as evidenced by request for info     Intervention  Comprehensive Nutrition Education Provided:     [] discussed healthy weight management, glucose management, lipid management, and BP management as related to diabetes   [] discussed basic meal planning guidelines for diabetes regular mealtime, limited concentrated sweets. Worked on establishing eating pattern/timing of meals and snacks    [] discussed in depth meal planning using the healthy eating with diabetes plate method with focus on balanced macronutrient consumption, including identifying foods that are carbohydrates, lean protein, non-starchy vegetables, and heart healthy fats   [x] stressed importance of carbohydrate consistency in each meal   [x] recommended carbohydrate targets of 30-45 grams at meals and 0-10 grams at snacks   [] educated on label reading   [] educated on portion control; including use of measuring cups, spoons, or food scale   [x] provided suggestions for lower carb, healthy snacks   [] educated on importance of food monitoring and how to use food logs   [] discussed how to handle special occasions, dining out, and eating on the go   [] discussed menu planning, healthy food shopping, cooking tips, and need to pre prepare foods    [x]  educated on emotional eating and being mindful at meals   [x]  reviewed other behavior modification strategies    [x]emphasized the need for small, sustainable changes and working on SMART goals to encourage sustainable behaviors    [] instructions on how to use helpful websites or nutrition/tracking apps reviewed    [] taught to use carbohydrate to insulin ratio and insulin sensitivity factor    [] discussed barriers and overcoming barriers to best achieve meal planning goals    [] discussed Mediterranean diet/DASH diet, as appropriate, to address lipids or BP   [] reviewed renal diet components and how to incorporate this with diabetes meal planning     Education on Increased Physical Activity:  discussed how increased physical activity improves insulin resistance, blood glucose control, and heart health    Will chat more on insulin - activity - blood sugars in the future    Monitoring  diet modification/understanding, medication compliance/understanding, blood sugars, hemoglobin A1c, lipid panel, and weight trends    Evaluation  Behavioral Goal(s) selected:   Healthy Eating and Taking Medications    See instructions from today's visit    Support Plan:  Other: TBD    Plan  Blood sugar goals: less than 130 mg/dl in the morning and less than 180 mg/dl 2 hours after meals.  Keep glucose tabs or fast acting carbohydrates with you for treatment of lows; for blood glucose levels less than 70 mg/dl, take 15 grams of fast acting carbohydrates (3-4 glucose tabs, 4 ounces of juice, or as discussed). Retest in 15 min. If not above 70 mg/dl, retreat.   Call Carlene Brandt RD at [phone] (option 3) for problems/concerns.     Carlene Brandt RD

## 2024-09-16 NOTE — PATIENT INSTRUCTIONS
Great job making appt for eye and foot doctor and completing these visits.  Great job avoiding soda and getting numbers down.     For today: Obtain glucose tabs or rapid acting carbs (honey, small snack size bag of skittles). Take 4 glucose tabs fro blood sugar less than 80 and check again in 15 - 20 min.  Bring food/insulin/blood sugar logs to next visit.

## 2024-09-26 NOTE — TELEPHONE ENCOUNTER
Noted. Cgm download reviewed and overall glucose readings are stable. She does have some postprandial hyperglycemia and on infrequent occasions having lower normal range glucose patterns. I suspect this is related to eating variable amt of carbs with each meal.    I see she has an upcoming apt on 10/7 with MICHAEL Davidson at [facility]. This apt would be helpful to review importance of keeping carbs consistent with each meal and also taking RA insulin 10-15mins prior to start of meal.     Patient also has apt with me on 10/9.     Thank you

## 2024-09-26 NOTE — TELEPHONE ENCOUNTER
JARVIS and sent MC to contact clinic to confirm DM meds:  - Lantus 20 units daily in the morning   - Novolog    4 units with breakfast   4 units with lunch  4 units with dinner     -----------------------------  Dexcom Clarity  -----------------------------  Zina Saravia  YOB: 1955  Generated at: Thu, Sep 26, 2024 1:50 PM CDT  Reporting period: Fri Sep 13, 2024 - Thu Sep 26, 2024  -----------------------------  Glucose Details  Average glucose: 187 mg/dL  Standard deviation: 45 mg/dL  GMI: 7.8%  -----------------------------  Time in Range  Very High: 10%  High: 40%  In Range: 50%  Low: 0%  Very Low: 0%    Target Range   mg/dL    -----------------------------  CGM Details  Sensor usage: 100%  Days with CGM data: 14/14

## 2024-09-30 NOTE — TELEPHONE ENCOUNTER
NEW sent with Ton's message.     Awaiting call back with current insulin regimen. Pt has read previous message sent by JOSE LUIS Harvey  Last read by Nely Saravia at  6:59 PM on 9/26/2024.

## 2024-10-07 ENCOUNTER — HOSPITAL ENCOUNTER (OUTPATIENT)
Dept: ENDOCRINOLOGY | Facility: HOSPITAL | Age: 69
End: 2024-10-07
Attending: FAMILY MEDICINE
Payer: MEDICARE

## 2024-10-07 ENCOUNTER — TELEPHONE (OUTPATIENT)
Dept: FAMILY MEDICINE CLINIC | Facility: CLINIC | Age: 69
End: 2024-10-07

## 2024-10-07 DIAGNOSIS — E11.21 TYPE 2 DIABETES MELLITUS WITH DIABETIC NEPHROPATHY, UNSPECIFIED WHETHER LONG TERM INSULIN USE (HCC): Primary | ICD-10-CM

## 2024-10-07 PROCEDURE — 97803 MED NUTRITION INDIV SUBSEQ: CPT

## 2024-10-07 NOTE — PROGRESS NOTES
Medical Nutrition Therapy Follow Up    Nely Saravia 6/30/1955 was seen for individual Diabetic Medical Nutrition Therapy:    Date: 09/16/2024   Start time 910A  End time: 1005A    Assessment   present with patient during initial visit, not follow up visits  Patient is using cane  Walks granddaughter home from school, so needs appts before 230P   is bus driving  Needs eye and foot exam - completed this since last visit, no dilated eye exam as didn't have a ride home, however    Anthropometrics:  Height: 5'5\"  Weight: 166#    Today 180#, pt did not expect this  Reports uptrending weight since sugars more controlled - regaining lost weight, does report higher food intake    Reports 40 - 60# loss since January? Unintentional?     Current diabetes medications:  Lantus 20 AM around 8-9A  4-4-4 at mealtimes rapid acting insulin  No SSI yet --> provided 1:50 over 150 [facility]F to [facility]    Labs:  Lab Results   Component Value Date    A1C 14.7 (H) 07/07/2024    A1C 9.8 (H) 03/13/2024    CHOLEST 201 (H) 07/08/2024    CHOLEST 214 (H) 03/13/2024     (H) 07/08/2024     (H) 03/13/2024    HDL 44 07/08/2024    HDL 65 (H) 03/13/2024    NONHDLC 157 (H) 07/08/2024    NONHDLC 149 (H) 03/13/2024    TRIG 117 07/08/2024    TRIG 68 03/13/2024    BUN 8 (L) 07/09/2024    BUN 10 07/08/2024    CREATSERUM 0.77 07/09/2024    CREATSERUM 0.68 07/08/2024    GFRNAA 61 10/07/2021    GFRNAA 57 (L) 10/06/2021    GFRAA 70 10/07/2021    GFRAA 65 10/06/2021       SMBG at home: Dexcom G7, some difficulty with adhesives - discussed skin tac and provided overpatches/tegaderm - now staying on more regularly   Current visit:     Complaints of lows post dinner, no logs with her - she does appear to drop pre dinner  Forgets insulin pre lunch on occasion  No logs again today   GMI: 7.8%  Average glucose 186 mg/dL  Coefficient of Variation 27.4%    % Time in Range 55%, 32% high, 13% very high       Diet Recall:   Last visit:  (AM) cereal  - cheerios with honey, almond milk unsweet  (LUNCH) out to eat - split beef sandwich, small order fries - doesn't like potatoes  (DINNER) BT sandwiches, doesn't like lettuce - little umana - fruit - vegetables   (SNACKS) once in a while, not a bit snacker, loves potato chips   (FLUIDS) water, unsweet tea, no coffee   Stopped soda - Dr. Pepper, 3 cups per day     Watching fried foods    Current visit:   Yesterday:   20 units AM: 4 units prior to Frosted Flakes from [employer] and almond milk, potentially whole banana - can't remember  Dinner: late lunch at [employer] - carrots, hashbrown casserole, crup of fruit, grilled chicken (4 units)    Physical Activity: Lower activity recently    Has stationary bike/pedals --> not doing this but will try  Uses walker/cane    Nutrition Diagnosis  Food/nutrition related knowledge deficit related to healthy eating/insulin (insulin - carb match) as evidenced by request for info     Intervention  Comprehensive Nutrition Education Provided:     [] discussed healthy weight management, glucose management, lipid management, and BP management as related to diabetes   [] discussed basic meal planning guidelines for diabetes regular mealtime, limited concentrated sweets. Worked on establishing eating pattern/timing of meals and snacks    [] discussed in depth meal planning using the healthy eating with diabetes plate method with focus on balanced macronutrient consumption, including identifying foods that are carbohydrates, lean protein, non-starchy vegetables, and heart healthy fats   [x] stressed importance of carbohydrate consistency in each meal   [x] recommended carbohydrate targets of 30-45 grams at meals and 0-10 grams at snacks   [] educated on label reading   [] educated on portion control; including use of measuring cups, spoons, or food scale   [x] provided suggestions for lower carb, healthy snacks   [] educated on importance of food monitoring and how to use food logs   []  discussed how to handle special occasions, dining out, and eating on the go   [] discussed menu planning, healthy food shopping, cooking tips, and need to pre prepare foods    [x] educated on emotional eating and being mindful at meals   [x]  reviewed other behavior modification strategies    [x]emphasized the need for small, sustainable changes and working on SMART goals to encourage sustainable behaviors    [] instructions on how to use helpful websites or nutrition/tracking apps reviewed    [] taught to use carbohydrate to insulin ratio and insulin sensitivity factor    [] discussed barriers and overcoming barriers to best achieve meal planning goals    [] discussed Mediterranean diet/DASH diet, as appropriate, to address lipids or BP   [] reviewed renal diet components and how to incorporate this with diabetes meal planning     Education on Increased Physical Activity:  discussed how increased physical activity improves insulin resistance, blood glucose control, and heart health    Will chat more on insulin - activity - blood sugars in the future    Monitoring  diet modification/understanding, medication compliance/understanding, blood sugars, hemoglobin A1c, lipid panel, and weight trends    Evaluation  Behavioral Goal(s) selected:   Healthy Eating and Taking Medications    See instructions from today's visit    Support Plan:  Other: TBD    Plan  Blood sugar goals: less than 130 mg/dl in the morning and less than 180 mg/dl 2 hours after meals.  Keep glucose tabs or fast acting carbohydrates with you for treatment of lows; for blood glucose levels less than 70 mg/dl, take 15 grams of fast acting carbohydrates (3-4 glucose tabs, 4 ounces of juice, or as discussed). Retest in 15 min. If not above 70 mg/dl, retreat.   Call Carlene Brandt RD at [phone] (option 3) for problems/concerns.     Carlene Brandt RD

## 2024-10-07 NOTE — PATIENT INSTRUCTIONS
Great job making appt for eye and foot doctor and completing these visits.  Great job avoiding soda and getting numbers down.      Great job obtaining glucose tabs or rapid acting carbs (honey, small snack size bag of skittles). Take 4 glucose tabs fro blood sugar less than 80 and check again in 15 - 20 min.  Bring food/insulin/blood sugar logs to next visit - try to do this to next visit. Visit date PRN.   Goal is to use 1:50 over 150 sliding sclae, put insulin in purse so you don't miss insulin when out, do 15 min pedal bike x 3 per week

## 2024-10-07 NOTE — TELEPHONE ENCOUNTER
Ian    [phone]     Calling to see if physician received physician order form, which was faxed Sept 11th to our fax at  [facility]: [phone]

## 2024-10-09 ENCOUNTER — TELEPHONE (OUTPATIENT)
Dept: ENDOCRINOLOGY CLINIC | Facility: CLINIC | Age: 69
End: 2024-10-09

## 2024-10-09 ENCOUNTER — OFFICE VISIT (OUTPATIENT)
Dept: ENDOCRINOLOGY CLINIC | Facility: CLINIC | Age: 69
End: 2024-10-09
Payer: MEDICARE

## 2024-10-09 VITALS
DIASTOLIC BLOOD PRESSURE: 70 MMHG | HEART RATE: 78 BPM | WEIGHT: 177.63 LBS | HEIGHT: 65 IN | SYSTOLIC BLOOD PRESSURE: 150 MMHG | BODY MASS INDEX: 29.59 KG/M2

## 2024-10-09 DIAGNOSIS — Z79.4 TYPE 2 DIABETES MELLITUS WITH HYPERGLYCEMIA, WITH LONG-TERM CURRENT USE OF INSULIN (HCC): Primary | ICD-10-CM

## 2024-10-09 DIAGNOSIS — I10 ESSENTIAL HYPERTENSION: ICD-10-CM

## 2024-10-09 DIAGNOSIS — E11.65 TYPE 2 DIABETES MELLITUS WITH HYPERGLYCEMIA, WITH LONG-TERM CURRENT USE OF INSULIN (HCC): Primary | ICD-10-CM

## 2024-10-09 DIAGNOSIS — E78.00 PURE HYPERCHOLESTEROLEMIA: ICD-10-CM

## 2024-10-09 LAB
GLUCOSE BLOOD: 187
HEMOGLOBIN A1C: 7.1 % (ref 4.3–5.6)
TEST STRIP LOT #: NORMAL NUMERIC

## 2024-10-09 PROCEDURE — 82947 ASSAY GLUCOSE BLOOD QUANT: CPT | Performed by: NURSE PRACTITIONER

## 2024-10-09 PROCEDURE — 99214 OFFICE O/P EST MOD 30 MIN: CPT | Performed by: NURSE PRACTITIONER

## 2024-10-09 PROCEDURE — 83036 HEMOGLOBIN GLYCOSYLATED A1C: CPT | Performed by: NURSE PRACTITIONER

## 2024-10-09 PROCEDURE — 95251 CONT GLUC MNTR ANALYSIS I&R: CPT | Performed by: NURSE PRACTITIONER

## 2024-10-09 NOTE — PROGRESS NOTES
-----------------------------  Dexcom Clarity  -----------------------------  Zina Cuellonerissajane  YOB: 1955  Generated at: Wed, Oct 9, 2024 1:08 PM CDT  Reporting period: Tue Sep 10, 2024 - Wed Oct 9, 2024  -----------------------------  Glucose Details  Average glucose: 182 mg/dL  Standard deviation: 48 mg/dL  GMI: 7.7%  -----------------------------  Time in Range  Very High: 10%  High: 35%  In Range: 55%  Low: 0%  Very Low: 0%  Target Range   mg/dL    -----------------------------  CGM Details  Sensor usage: 100%  Days with CGM data: 30/30

## 2024-10-09 NOTE — PATIENT INSTRUCTIONS
A1C: 7.1% today --> decreased from 14.7% on 7/7/2024  Blood glucose: 187 in clinic today    Medications:   - continue with Lantus 20 units daily in the morning   -  increase Novolog    4 units with breakfast   4 --> 6 units with lunch  4 units with dinner     If eating out + 2-3 units of Novolog with the meal     - repeat lipid panel before next f/u visit - fast for 10hrs prior     Weight:  Wt Readings from Last 6 Encounters:   10/09/24 177 lb 9.6 oz (80.6 kg)   08/27/24 172 lb (78 kg)   08/12/24 166 lb (75.3 kg)   08/08/24 166 lb (75.3 kg)   08/05/24 167 lb 1.6 oz (75.8 kg)   07/24/24 166 lb (75.3 kg)     A1C goal:  <7.0%    Blood sugar testing:  Continue using Dexcom G7 continuous glucometer     Blood sugar targets:  Before breakfast:  (preferably < 110)  Before meals: <150     Call for persistent blood sugars < 75 or > 200

## 2024-10-09 NOTE — PROGRESS NOTES
Name: Nely Saravia  Date: 10/9/2024    CHIEF COMPLAINT   Chief Complaint   Patient presents with    Diabetes     Pt is here for follow up for diabetes and A1c check        HISTORY OF PRESENT ILLNESS   Nely Saravia is a 69 year old female who presents for follow up on diabetes management.   Hgb A1C: 7.1% at POC Today. Previously was 14.7% on 7/7/2024  Blood glucose is: 187 in clinic today.      FAMILY HISTORY OF DIABETES  -sister and father - T2DM  DIABETES HISTORY  Diagnosed: around 10 years ago   Prior HbA, C or glycohemoglobin were 13.8% on 9/24/2021; 12.4% 10/20/2021;  7.4% 12/1/2021; 8.0% 3/2022; 9.8% 3/2024; 14.7% 7/7/2024; 7.1% at POC today;     Patient has had hospitalizations for blood sugar issues and denies any history of pancreatitis    Hospitalized 10/1/21 - with DKA    PREVIOUS MEDICATION FOR DM:  Metformin- D/c'ed GI s/e   Glimepiride  - d/c'ed due to ineffectiveness   Pioglitazone - d/c'ed due to ineffectiveness  70/30 -d/c'ed due to lack of effectiveness     CURRENT MEDICATIONS FOR DM:  - Lantus 20 units daily in the morning   - Novolog   4 units with breakfast   4 units with lunch  4 units with dinner     HOME GLUCOSE READINGS:   Dexcom G7 continuous glucometer download reviewed   Generated at: Wed, Oct 9, 2024 1:08 PM CDT  Reporting period: Tue Sep 10, 2024 - Wed Oct 9, 2024  -----------------------------  Glucose Details  Average glucose: 182 mg/dL  Standard deviation: 48 mg/dL  GMI: 7.7%  -----------------------------  Time in Range  Very High: 10%  High: 35%  In Range: 55%  Low: 0%  Very Low: 0%  Target Range   mg/dL     -----------------------------  CGM Details  Sensor usage: 100%  Days with CGM data: 30/30    Continuous Glucose Monitoring Interpretation    Nely Saravia has undergone continuous glucose monitoring with the personal Dexcom G7 continuous glucose monitor.  The blood glucose tracings were evaluated for two weeks prior to office visit.  Her blood glucose tracings  demonstrated occasional postprandial hyperglycemia when eating take out foods. She did not experience any hypoglycemia during the week of evaluation.  As a result of her/his testing her  medications were adjusted per below.     HISTORY OF DIABETES COMPLICATIONS:  History of Retinopathy: no - last eye exam within the last 12 months: yes - followed by Dr. Hampton   History of Neuropathy: no   History of Nephropathy: no     ASSOCIATED COMPLICATIONS:   HTN: yes   Hyperlipidemia: yes   Cardiovascular Disease: no   Peripheral Vascular Disease: no     DIETARY COMPLIANCE:  Denies dietary changes; continues to work on low carb diet     EXERCISE:   No- however stays very active with grandchildren (5-12 year olds).    - since hospitalization she has been getting around with walker for additional stabilization.     Polyuria, polyphagia, polydipsia: yes --intermittent polyuria overnight   Paresthesias: no   Blurred vision: no   Recent steroids, illness or infections: no     REVIEW OF SYSTEMS  Constitutional: Negative for: weight change, fever, fatigue, cold/heat intolerance  Eyes: Negative for:  Visual changes, proptosis, blurring  ENT: Negative for:  dysphagia, neck swelling, dysphonia  Respiratory: Negative for: hemoptysis, shortness of breath, cough, or dyspnea.  Cardiovascular: Negative for:  chest pain, chest discomfort, palpitations  GI: Negative for:  abdominal pain, nausea, vomiting, diarrhea, heartburn, constipation  Neurology: Negative for: headache, dizziness, syncope, numbness/tingling, or weakness.   Genito-Urinary: Negative for: dysuria, frequency or hematuria   Hematology/Lymphatics: Negative for: bruising, easy bleeding, lower extremity edema  Skin: Negative for: rash, blister, infection or ulcers.  Endocrine: Negative for: polyuria, polydipsia. No osteoporosis. No thyroid disease.     MEDICATIONS:     Current Outpatient Medications:     atorvastatin 80 MG Oral Tab, Take 1 tablet (80 mg total) by mouth daily.,  Disp: 90 tablet, Rfl: 3    metoprolol succinate ER 25 MG Oral Tablet 24 Hr, Take 1 tablet (25 mg total) by mouth Daily Beta Blocker., Disp: 90 tablet, Rfl: 3    Continuous Glucose Sensor (DEXCOM G7 SENSOR) Does not apply Misc, 1 each Every 10 days. Use as directed every 10 days, Disp: 3 each, Rfl: 11    amoxicillin 875 MG Oral Tab, Take 1 tablet (875 mg total) by mouth 2 (two) times daily., Disp: 20 tablet, Rfl: 0    insulin aspart (NOVOLOG FLEXPEN) 100 Units/mL Subcutaneous Solution Pen-injector, Inject 4 Units into the skin 3 (three) times daily before meals., Disp: 12 mL, Rfl: 0    insulin glargine (LANTUS SOLOSTAR) 100 UNIT/ML Subcutaneous Solution Pen-injector, Inject 20 Units into the skin every morning., Disp: 18 mL, Rfl: 0    Insulin Pen Needle 33G X 4 MM Does not apply Misc, 1 each in the morning, at noon, in the evening, and at bedtime. Use 4x daily with insulin, Disp: 400 each, Rfl: 1    lisinopril 40 MG Oral Tab, Take 0.5 tablets (20 mg total) by mouth daily., Disp: 30 tablet, Rfl: 1    aspirin 81 MG Oral Tab EC, Take 1 tablet (81 mg total) by mouth daily., Disp: 30 tablet, Rfl: 1    Meloxicam 7.5 MG Oral Tab, Take 1 tablet (7.5 mg total) by mouth daily., Disp: 30 tablet, Rfl: 1    Glucose Blood (ONETOUCH ULTRA) In Vitro Strip, To check blood sugars up to twice per day, Disp: 100 strip, Rfl: 2    Blood Glucose Monitoring Suppl (ONETOUCH ULTRA MINI) w/Device Does not apply Kit, To use as directed., Disp: 1 kit, Rfl: 0    amLODIPine 10 MG Oral Tab, Take 1 tablet (10 mg total) by mouth every morning., Disp: 90 tablet, Rfl: 3    traMADol 50 MG Oral Tab, Take 1 tablet (50 mg total) by mouth every 6 (six) hours as needed for Pain. No alcohol or driving on this med. Stop if lethargic or hallucinating., Disp: 20 tablet, Rfl: 0    neomycin-polymyxin-hydrocortisone 3.5-14084-2 Otic Solution, Place 3 drops into the right ear 3 (three) times daily., Disp: 10 mL, Rfl: 0    ALLERGIES:   No Known Allergies    SOCIAL  HISTORY:   Social History     Socioeconomic History    Marital status:    Tobacco Use    Smoking status: Never    Smokeless tobacco: Never   Vaping Use    Vaping status: Never Used   Substance and Sexual Activity    Alcohol use: Never    Drug use: Never       PAST MEDICAL HISTORY:   Past Medical History:    Diabetes (HCC)    High blood pressure    Hyperlipidemia    TIA (transient ischemic attack)       PAST SURGICAL HISTORY:   Past Surgical History:   Procedure Laterality Date    Hysterectomy         PHYSICAL EXAM:   Vitals:    10/09/24 1342   BP: 150/70   Pulse: 78   Weight: 177 lb 9.6 oz (80.6 kg)   Height: 5' 5\" (1.651 m)       BMI:   Body mass index is 29.55 kg/m².    General Appearance:  alert, well developed, in no acute distress  Nutritional:  no extreme weight gain or loss  Head: Atraumatic  Eyes:  normal conjunctivae, sclera., normal sclera and normal pupils  Throat/Neck: normal sound to voice. Normal hearing, normal speech  Back: no kyphosis  Respiratory:  Speaking in full sentences, non-labored. no increased work of breathing, no audible wheezing    Skin:  normal moisture and skin texture, no visible lesions  Hair and nails: normal scalp hair  Hematologic:  no excessive bruising  Neuro: motor grossly intact, moving all extremities without difficulty  Psychiatric:  oriented to time, self, and place  Extremities: no obvious extremity swelling, no lesions    LABS: Pertinent labs reviewed    ASSESSMENT/PLAN:    -Reviewed with patient the pathogenesis of diabetes, clinical significance of A1c, and common complications such as: microvascular, macrovascular and diabetic ketoacidosis. Patient verbalizes understanding of the importance of glycemic control and the goals of therapy.   Per ADA 2024 guidelines, it is recommended that older adults (age 65 and above) who are healthy with few coexisting chronic illnesses, intact cognitive and functional status, should have A1C goal of <7.5%, fasting or preprandial  glucose of  and bedtime glucose of .      1.Type 2 Diabetes Mellitus, uncontrolled, with hyperglycemia with long term insulin use   -LAB DATA  HbA,C: 7.1% today   - continue with Lantus 20 units daily in the morning   - increase Novolog    4 units with breakfast   4 --> 6 units with lunch  4 units with dinner   + 2-3 units if eating higher carb foods    - continue with low carb diet.   - continue to stay active as currently doing   -reviewed target goal BG readings and A1C   -reviewed when to call and notify me of abnormal BG readings       b) No nephropathy: GFR: 83 on 2024 and urine MA: 108.6 on 3/13/2024  c) UTD with optho - followed by Dr. Mobley --> will work on obtaining report   D)foot exam: normal on 2024  e) cont. with Dexcom G7 cgm   f) Life style changes reviewed     2. Hypertension   - metoprolol succinate ER 25mg daily, lisinopril 40mg daily and amlodipine 10mg daily - pt verbalizes compliance.   -at home BP usually in 130s/80s   - above goal in clinic today.   - discussed to continue to monitor and if home readings are persistently >140/80 to call and notify me    3.Hyperlipidemia   -LDL: 136 and Tri on 2024  -on atorvastatin 40mg at bedtime   -repeat lipid panel before next f/u visit       RTC in 3 months   Patient instructed to call sooner if they develop Blood glucose readings <75 and/or if they have readings persistently >200.     The risks and benefits of my recommendations were discussed with the patient today. questions were also answered to the best of my knowledge. Patient verbalizes understanding of these issues and agrees to the plan.    10/9/2024  YONG Quiñones

## 2024-10-09 NOTE — TELEPHONE ENCOUNTER
Order from 9/11 in media was faxed on 9/12. Not sure if missing pages? Re-faxed/confirmed to [facility] f:[phone]

## 2024-10-09 NOTE — TELEPHONE ENCOUNTER
Please obtain most recent optho office visit note.     Patient is followed by Dr. Vini Hampton  tel:[phone]    Thank you

## 2024-10-10 ENCOUNTER — TELEPHONE (OUTPATIENT)
Dept: ENDOCRINOLOGY CLINIC | Facility: CLINIC | Age: 69
End: 2024-10-10

## 2024-10-10 NOTE — TELEPHONE ENCOUNTER
Received a fax of patients most recent eye exam dated on 9/11/24 with Dr. Shane at [facility]. Eye exam was documented in patient's 'Diabetes Flowsheet' and placed in providers folder for review.

## 2024-10-11 RX ORDER — INSULIN GLARGINE 100 [IU]/ML
20 INJECTION, SOLUTION SUBCUTANEOUS EVERY MORNING
Qty: 18 ML | Refills: 0 | Status: SHIPPED | OUTPATIENT
Start: 2024-10-11 | End: 2025-01-09

## 2024-10-11 NOTE — TELEPHONE ENCOUNTER
Endocrine refill protocol for basal insulins     Protocol Criteria: PASSED Reason: N/A    If all below requirements are met, send a 90-day supply with 1 refill per provider protocol.       Verify Appointment with Endocrinology completed in the last 6 months or scheduled in the next 3 months.  Verify A1C has been completed within the last 6 months and is below 8.5%     Last completed office visit:10/9/2024 Ton Lawrence APRN   Next scheduled Follow up:   Future Appointments   Date Time Provider Department Center          1/13/2025 10:45 AM Ton Lawrence APRN [facility]      Last A1c result: Last A1c value was 7.1% done 10/9/2024.

## 2024-11-16 RX ORDER — LISINOPRIL 20 MG/1
20 TABLET ORAL DAILY
Qty: 90 TABLET | Refills: 1 | Status: SHIPPED | OUTPATIENT
Start: 2024-11-16

## 2024-11-16 NOTE — TELEPHONE ENCOUNTER
Message noted: Chart reviewed and may refill medication as requested. Prescription sent to listed pharmacy. Pharmacy to notify patient. Pt notified through [employer]

## 2024-11-20 ENCOUNTER — OFFICE VISIT (OUTPATIENT)
Dept: NEUROLOGY | Facility: CLINIC | Age: 69
End: 2024-11-20
Payer: MEDICARE

## 2024-11-20 VITALS
HEIGHT: 65 IN | SYSTOLIC BLOOD PRESSURE: 125 MMHG | BODY MASS INDEX: 30.16 KG/M2 | HEART RATE: 71 BPM | RESPIRATION RATE: 16 BRPM | WEIGHT: 181 LBS | DIASTOLIC BLOOD PRESSURE: 71 MMHG

## 2024-11-20 DIAGNOSIS — I69.90 SEQUELAE OF CEREBROVASCULAR DISEASE: Primary | ICD-10-CM

## 2024-11-20 PROCEDURE — 99213 OFFICE O/P EST LOW 20 MIN: CPT | Performed by: INTERNAL MEDICINE

## 2024-11-20 PROCEDURE — 1159F MED LIST DOCD IN RCRD: CPT | Performed by: INTERNAL MEDICINE

## 2024-11-20 PROCEDURE — 1160F RVW MEDS BY RX/DR IN RCRD: CPT | Performed by: INTERNAL MEDICINE

## 2024-11-20 NOTE — PROGRESS NOTES
[facility] [address], [facility] [address]  [facility] [address]  [phone]            Neurology Initial Visit     Referred By: Dr. Ly ref. provider found    Chief Complaint:   Chief Complaint   Patient presents with    Neurologic Problem     LOV 7/25/2024 For Ischemic stroke. Patient here today 4  month follow up for test orders/results of Occupation therapy and medication management atorvastatin 40 mg and ASA 81 MG. Patient reports not taking atorvastatin 40 MG.   Denies numbness, tingling, dizziness or nausea. Bp today 125/71        HPI:     Nely Saravia is a 69 year old female with history of diabetes, hypertension, hyperlipidemia, who presents for follow-up for stroke in July 2024.  Doing well since then.  She stopped the Plavix as directed and is taking the atorvastatin and aspirin.  No new stroke symptoms, feels like her gait and balance have made significant improvement but not quite back to baseline.  She is driving, completed therapy.  Came to appointment without any assistive device for walking, has a cane but only uses it sometimes.      Past Medical History:    Diabetes (HCC)    High blood pressure    Hyperlipidemia    TIA (transient ischemic attack)       Past Surgical History:   Procedure Laterality Date    Hysterectomy         Social history:  History   Smoking Status    Never   Smokeless Tobacco    Never     History   Alcohol Use Never     History   Drug Use Unknown       Family History   Problem Relation Age of Onset    Diabetes Father     Hypertension Father     Hypertension Mother          Current Outpatient Medications:     lisinopril 20 MG Oral Tab, TAKE 1 TABLET BY MOUTH EVERY DAY, Disp: 90 tablet, Rfl: 1    LANTUS SOLOSTAR 100 UNIT/ML Subcutaneous Solution Pen-injector, Inject 20 Units into the skin every morning., Disp: 18 mL, Rfl: 0    atorvastatin 80 MG Oral Tab, Take 1 tablet (80 mg total) by mouth daily., Disp: 90 tablet, Rfl: 3    metoprolol  succinate ER 25 MG Oral Tablet 24 Hr, Take 1 tablet (25 mg total) by mouth Daily Beta Blocker., Disp: 90 tablet, Rfl: 3    Continuous Glucose Sensor (DEXCOM G7 SENSOR) Does not apply Misc, 1 each Every 10 days. Use as directed every 10 days, Disp: 3 each, Rfl: 11    amoxicillin 875 MG Oral Tab, Take 1 tablet (875 mg total) by mouth 2 (two) times daily., Disp: 20 tablet, Rfl: 0    insulin aspart (NOVOLOG FLEXPEN) 100 Units/mL Subcutaneous Solution Pen-injector, Inject 4 Units into the skin 3 (three) times daily before meals., Disp: 12 mL, Rfl: 0    Insulin Pen Needle 33G X 4 MM Does not apply Misc, 1 each in the morning, at noon, in the evening, and at bedtime. Use 4x daily with insulin, Disp: 400 each, Rfl: 1    lisinopril 40 MG Oral Tab, Take 0.5 tablets (20 mg total) by mouth daily., Disp: 30 tablet, Rfl: 1    aspirin 81 MG Oral Tab EC, Take 1 tablet (81 mg total) by mouth daily., Disp: 30 tablet, Rfl: 1    Meloxicam 7.5 MG Oral Tab, Take 1 tablet (7.5 mg total) by mouth daily., Disp: 30 tablet, Rfl: 1    Glucose Blood (ONETOUCH ULTRA) In Vitro Strip, To check blood sugars up to twice per day, Disp: 100 strip, Rfl: 2    Blood Glucose Monitoring Suppl (ONETOUCH ULTRA MINI) w/Device Does not apply Kit, To use as directed., Disp: 1 kit, Rfl: 0    amLODIPine 10 MG Oral Tab, Take 1 tablet (10 mg total) by mouth every morning., Disp: 90 tablet, Rfl: 3    traMADol 50 MG Oral Tab, Take 1 tablet (50 mg total) by mouth every 6 (six) hours as needed for Pain. No alcohol or driving on this med. Stop if lethargic or hallucinating., Disp: 20 tablet, Rfl: 0    neomycin-polymyxin-hydrocortisone 3.5-57899-2 Otic Solution, Place 3 drops into the right ear 3 (three) times daily., Disp: 10 mL, Rfl: 0    No Known Allergies    ROS:   As in HPI, the rest of the 14 system review was done and was negative      Physical Exam:  Vitals:    11/20/24 1005   BP: 125/71   Pulse: 71   Resp: 16   Weight: 181 lb (82.1 kg)   Height: 65\"        General: No apparent distress, well nourished, well groomed.  Head- Normocephalic, atraumatic  Eyes- No redness or swelling    Neurological:   Mental Status:  Mental Status- Alert, conversant, speech fluent, following all commands, Fund of knowledge appropriate for education and age, and Thought process intact    Cranial Nerves:  II.- Visual fields full to confrontation, III, IV, VI- EOM intact,  V. Facial sensation intact, VII. Face symmetric, no facial weakness, and XII. Tongue is midline    Motor Exam:  Strength- upper extremities 5/5 proximally and distally                - lower  extremities 5/5 proximally and distally    Sensory Exam:  Light touch- intact in all 4 extremities      Coordination:  No dysmetria with finger to nose and heel-to-shin bilaterally    Gait:  Walking unassisted, wide-based gait    Labs:    No results found for: \"TSH\"  Lab Results   Component Value Date    HDL 44 07/08/2024     (H) 07/08/2024    TRIG 117 07/08/2024     Lab Results   Component Value Date    HGB 14.5 07/09/2024    HCT 41.7 07/09/2024    MCV 89.3 07/09/2024    WBC 10.5 07/09/2024    .0 07/09/2024      Lab Results   Component Value Date    BUN 8 (L) 07/09/2024    CA 9.1 07/09/2024    ALT 14 07/07/2024    AST 19 07/07/2024    ALB 4.2 07/07/2024     07/09/2024    K 4.2 07/09/2024     07/09/2024    CO2 26.0 07/09/2024      I have reviewed labs. A1c 14 7/7/24    Imaging Studies:  CTA showed no LVO  MRI showed small punctate acute infarct in the left periaqueductal gray        Assessment   Nely aSravia is a 69 year old female with history of diabetes, hyperlipidemia, hypertension, who presents for follow-up of acute left periaqueductal gray stroke in July 2024. Presented with intractable nausea, dizziness, and vomiting.  Gait is still little bit off balance compared to baseline, but noticing continual improvement  .  1. Ischemic stroke (HCC)  -Continue aspirin 81 mg daily  indefinitely  -Continue atorvastatin 40 mg daily  -Continue aggressive glycemic control, blood pressure management  -Call 911 if acute stroke symptoms occur       Education and counseling provided to patient. Instructed patient to call my office or seek medical attention immediately if symptoms worsen.  Patient verbalized understanding of information given. All questions were answered. All side effects of drugs were discussed.     Time spent for examination, counseling and coordination of care such as potential treatment options- 30 minutes with more than 50% of the time spent counseling the patient.    Return to clinic in: No follow-ups on file.    Genna Pizarro MD

## 2024-11-21 ENCOUNTER — MED REC SCAN ONLY (OUTPATIENT)
Dept: FAMILY MEDICINE CLINIC | Facility: CLINIC | Age: 69
End: 2024-11-21

## 2024-11-22 NOTE — PATIENT INSTRUCTIONS
Problem: Adult Inpatient Plan of Care  Goal: Plan of Care Review  Outcome: Progressing  Flowsheets  Taken 11/22/2024 0104 by Fallon Mendieta, RN  Progress: improving  Outcome Evaluation: Pt out of bed to chair. Assisted x 1 back to bed. Resting comfortably at this time. Denied need for pain medication.  Taken 11/21/2024 0902 by Shayy Mcmillan, JACKLYN  Plan of Care Reviewed With: patient                                          A1C: 14.7% 7/7/2024 --> previously was 9.8% on 3/13/2024   Blood glucose: 244 in clinic today    Medications:   -Increase Novolin 70/30 until able to start new insulin:   20 --> 26 units with breakfast    20 units with dinner       New regimen in the morning:  - stop 70/30 insulin   -start Lantus 20 units daily in the morning   - start Novolog    4 units with breakfast   4 units with lunch  4 units with dinner     Please give me an update on your glucose readings 1 day after starting new insulin      - include protein with each meal   - meats/fish   - eggs   - greek yogurt/high protein yogurt: \"light and fit\" or \"two good\"   -cottage cheese   - protein shakes: glucerna or ensure protein max or premier or orgain or fair life   - peanut butter   - nuts/seeds   - Humus     Let's start to eat meals in the following order:   - veggies first (preferably high in fiber)   - protein second (preferably lean - skinless chicken/fish/turkey)   - carbohydrates last (preferably complex- whole wheat products)        Weight:  Wt Readings from Last 6 Encounters:   08/05/24 167 lb 1.6 oz (75.8 kg)   07/24/24 166 lb (75.3 kg)   07/10/24 158 lb (71.7 kg)   07/09/24 144 lb 14.4 oz (65.7 kg)   07/07/24 149 lb 14.6 oz (68 kg)   03/16/24 167 lb (75.8 kg)     A1C goal:  <7.5%    Blood sugar testing:  Start using Dexcom G7 continuous glucometer     Blood sugar targets:  Before breakfast:  (preferably < 110)  Before meals: <150     Call for persistent blood sugars < 75 or > 200

## 2025-04-29 ENCOUNTER — TELEPHONE (OUTPATIENT)
Dept: FAMILY MEDICINE CLINIC | Facility: CLINIC | Age: 70
End: 2025-04-29

## 2025-05-16 ENCOUNTER — MED REC SCAN ONLY (OUTPATIENT)
Dept: FAMILY MEDICINE CLINIC | Facility: CLINIC | Age: 70
End: 2025-05-16

## (undated) DEVICE — LINE MNTR ADLT SET O2 INTMD

## (undated) DEVICE — CONMED SCOPE SAVER BITE BLOCK, 20X27 MM: Brand: SCOPE SAVER

## (undated) DEVICE — Device: Brand: DEFENDO AIR/WATER/SUCTION AND BIOPSY VALVE

## (undated) DEVICE — 35 ML SYRINGE REGULAR TIP: Brand: MONOJECT

## (undated) DEVICE — 6 ML SYRINGE LUER-LOCK TIP: Brand: MONOJECT

## (undated) DEVICE — KIT CLEAN ENDOKIT 1.1OZ GOWNX2

## (undated) DEVICE — 3 ML SYRINGE LUER-LOCK TIP: Brand: MONOJECT

## (undated) DEVICE — MEDI-VAC NON-CONDUCTIVE SUCTION TUBING 6MM X 1.8M (6FT.) L: Brand: CARDINAL HEALTH

## (undated) DEVICE — FORCEP RADIAL JAW 4

## (undated) NOTE — LETTER
[facility]  Doctor Sasha , [place], [address] [place] [address]  Phone: [phone]  Fax: [phone],     This is the [facility], office of Dr. Ritchie Garsia. Thank you for putting your trust in [place] [address]. Our goal is to deliver the highest quality healthcare and an exceptional patient experience. Review of your medical record shows you are due for the following:       Annual Medicare Physical   Mammogram   Colonoscopy  Dexa Scan   Diabetic A1C follow up   Diabetic Care Microalbumin    Please call [phone] to schedule your appointment or schedule online via [employer]. If you changed to a new provider at another facility, please notify the clinic to update your records. If you had any recent testing at another facility, please have your results faxed to our office at [phone]. Thank you and have a great day! 10/25/23

## (undated) NOTE — LETTER
[facility], [place]  Authorization for Invasive Procedures  1.  I hereby authorize Dr. John Daley , my physician and whomever may be designated as the doctor's assistant, to perform the following operation and/or procedure:  esophagogastr fever and allergic reactions, hemolytic reactions, transmission of disease such as hepatitis, AIDS, cytomegalovirus (CMV), and flluid overload.  In the event that I wish to have autologous transfusions of my own blood, or a directed donor transfusion, I isaiah Signature of Patient:  ________________________________________________ Date: _________Time: _________    Responsible person in case of minor or unconscious: _____________________________Relationship: ____________     Witness Signature: _______________

## (undated) NOTE — LETTER
This is the [facility], office of Dr. Livia House. Thank you for putting your trust in [place] [address]. Our goal is to deliver the highest quality healthcare and an exceptional patient experience. Review of your medical record shows you are due for the following:     Annual Medicare Physical   Mammogram   Colonoscopy  Dexa Scan   Diabetic A1C follow up   Diabetic Eye Exam   Diabetic Foot Exam   Lung Screening       Please call [phone] to schedule your appointment or schedule online via [employer]. If you changed to a new provider at another facility, please notify the clinic to update your records. If you had any recent testing at another facility, please have your results faxed to our office at [phone].      Thank you and have a great day!11/17/23

## (undated) NOTE — LETTER
AUTHORIZATION FOR SURGICAL OPERATION OR OTHER PROCEDURE    1. I hereby authorize Dr. Mtz/Roni COELHO, and [facility] staff assigned to my case to perform the following operation and/or procedure at the [facility] site:    _______________________________________________________________________________________________    Right. Knee cortisone injection  _______________________________________________________________________________________________    2.  My physician has explained the nature and purpose of the operation or other procedure, possible alternative methods of treatment, the risks involved, and the possibility of complication to me.  I acknowledge that no guarantee has been made as to the result that may be obtained.  3.  I recognize that, during the course of this operation, or other procedure, unforseen conditions may necessitate additional or different procedure than those listed above.  I, therefore, further authorize and request that the above named physician, his/her physician assistants or designees perform such procedures as are, in his/her professional opinion, necessary and desirable.  4.  Any tissue or organs removed in the operation or other procedure may be disposed of by and at the discretion of the [facility] and [facility].  5.  I understand that in the event of a medical emergency, I will be transported by local paramedics to [facility] or other hospital emergency department.  6.  I certify that I have read and fully understand the above consent to operation and/or other procedure.    7.  I acknowledge that my physician has explained sedation/analgesia administration to me including the risks and benefits.  I consent to the administration of sedation/analgesia as may be necessary or desirable in the judgement of my physician.    Witness signature: ___________________________________________________ Date:   ______/______/_____                    Time:  ________ A.M.  P.M.       Patient Name:  ______________________________________________________  (please print)      Patient signature:  ___________________________________________________             Relationship to Patient:           []  Parent    Responsible person                          []  Spouse  In case of minor or                    [] Other  _____________   Incompetent name:  __________________________________________________                               (please print)      _____________      Responsible person  In case of minor or  Incompetent signature:  _______________________________________________    Statement of Physician  My signature below affirms that prior to the time of the procedure, I have explained to the patient and/or his/her guardian, the risks and benefits involved in the proposed treatment and any reasonable alternative to the proposed treatment.  I have also explained the risks and benefits involved in the refusal of the proposed treatment and have answered the patient's questions.                        Date:  ______/______/_______  Provider                      Signature:  __________________________________________________________       Time:  ___________ A.M    P.M.

## (undated) NOTE — LETTER
May 7, 2024    Nely Saravia  [address]  [facility] [address]    Dear MsKatey Cuategreta:    In addition to helping you feel better when you are sick, we are interested in helping with your routine preventative services.  In the spirit of maintaining your good health, our system indicates that you are       Health Maintenance Due   Topic Date Due    Diabetes Care Dilated Eye Exam  Never done    Colorectal Cancer Screening  Never done    Mammogram  Never done    Zoster Vaccines (1 of 2) Never done    DEXA Scan  Never done    COVID-19 Vaccine (6 - 2023-24 season) 09/01/2023    MA Annual Health Assessment  01/01/2024    HTN: BP Follow-Up  05/04/2024             Please call us to make an appointment at your earliest convenience.       Sincerely,      Armando Méndez MD

## (undated) NOTE — LETTER
12/03/21        Melyssa Palmer  [address]      Dear Abby Whyte,    Our records indicate that you have outstanding lab work and or testing that was ordered for you and has not yet been completed:     [address] [place] [address]

## (undated) NOTE — LETTER
[facility]  [address], [facility], [address]  Phone: [phone]  Fax: [phone],     This is the [facility] clinic, office of Dr. Malika Sanchez. Thank you for putting your trust in [place] [address]. Our goal is to deliver the highest quality healthcare and an exceptional patient experience. Review of your medical record shows you are due for the following:       Annual Medicare Physical   Mammogram   Colonoscopy  Dexa Scan    Diabetic A1C follow up   Diabetic Eye Exam   Diabetic Foot Exam       Please call [phone] to schedule your appointment or schedule online via [employer]. If you changed to a new provider at another facility, please notify the clinic to update your records. If you had any recent testing at another facility, please have your results faxed to our office at [phone]. Thank you and have a great day!

## (undated) NOTE — LETTER
Date & Time: 1/8/2022, 1:03 PM  Patient: Rip Strauss  Encounter Provider(s):    YONG Miller       To Whom It May Concern:    Bob Velasquez was seen and treated in our department on 1/8/2022. She {Return to school/sport/work:[id number]}.

## (undated) NOTE — LETTER
[address] [facility]     I agree to have a Peripherally Inserted Central Catheter (PICC) placed in my arm.    1. The PICC insertion procedure, care, maintenance, risks, benefits, and complications have been explained to me b the PICC, including risks, benefits, and side effects related to the alternatives and risks related to not receiving this procedure.     8.  I have expressed any questions about this procedure to my physician or the PICC Proceduralist and he/she has answere